# Patient Record
Sex: FEMALE | Race: WHITE | NOT HISPANIC OR LATINO | URBAN - METROPOLITAN AREA
[De-identification: names, ages, dates, MRNs, and addresses within clinical notes are randomized per-mention and may not be internally consistent; named-entity substitution may affect disease eponyms.]

---

## 2017-12-12 ENCOUNTER — OUTPATIENT (OUTPATIENT)
Dept: OUTPATIENT SERVICES | Facility: HOSPITAL | Age: 67
LOS: 1 days | Discharge: HOME | End: 2017-12-12

## 2017-12-12 DIAGNOSIS — M54.5 LOW BACK PAIN: ICD-10-CM

## 2018-06-11 ENCOUNTER — LABORATORY RESULT (OUTPATIENT)
Age: 68
End: 2018-06-11

## 2018-06-12 ENCOUNTER — LABORATORY RESULT (OUTPATIENT)
Age: 68
End: 2018-06-12

## 2018-06-12 ENCOUNTER — APPOINTMENT (OUTPATIENT)
Dept: HEMATOLOGY ONCOLOGY | Facility: CLINIC | Age: 68
End: 2018-06-12

## 2018-06-12 VITALS
WEIGHT: 116 LBS | TEMPERATURE: 98.3 F | DIASTOLIC BLOOD PRESSURE: 90 MMHG | BODY MASS INDEX: 26.1 KG/M2 | HEIGHT: 56 IN | HEART RATE: 89 BPM | RESPIRATION RATE: 16 BRPM | SYSTOLIC BLOOD PRESSURE: 172 MMHG

## 2018-06-12 DIAGNOSIS — Z86.79 PERSONAL HISTORY OF OTHER DISEASES OF THE CIRCULATORY SYSTEM: ICD-10-CM

## 2018-06-12 DIAGNOSIS — Z86.39 PERSONAL HISTORY OF OTHER ENDOCRINE, NUTRITIONAL AND METABOLIC DISEASE: ICD-10-CM

## 2018-06-12 DIAGNOSIS — Z78.9 OTHER SPECIFIED HEALTH STATUS: ICD-10-CM

## 2018-06-12 DIAGNOSIS — Z82.49 FAMILY HISTORY OF ISCHEMIC HEART DISEASE AND OTHER DISEASES OF THE CIRCULATORY SYSTEM: ICD-10-CM

## 2018-06-12 LAB
HCT VFR BLD CALC: 31.9 %
HGB BLD-MCNC: 9.3 G/DL
MCHC RBC-ENTMCNC: 18 PG
MCHC RBC-ENTMCNC: 29.2 G/DL
MCV RBC AUTO: 61.7 FL
PLATELET # BLD AUTO: 224 K/UL
PMV BLD: NORMAL
RBC # BLD: 5.17 M/UL
RBC # FLD: 21 %
RETICS # AUTO: 2.2 %
RETICS AGGREG/RBC NFR: 114.8 K/UL
WBC # FLD AUTO: 14.31 K/UL

## 2018-06-12 RX ORDER — ATORVASTATIN CALCIUM 80 MG/1
TABLET, FILM COATED ORAL
Refills: 0 | Status: ACTIVE | COMMUNITY

## 2018-06-13 LAB
ALBUMIN SERPL ELPH-MCNC: 3.8 G/DL
ALBUPE: 40.4 %
ALP BLD-CCNC: 90 U/L
ALPHA1UPE: 17.3 %
ALPHA2UPE: 16.5 %
ALT SERPL-CCNC: 17 U/L
ANION GAP SERPL CALC-SCNC: 17 MMOL/L
APPEARANCE: CLEAR
AST SERPL-CCNC: 16 U/L
BETAUPE: 12.4 %
BILIRUB SERPL-MCNC: 0.4 MG/DL
BILIRUBIN URINE: NEGATIVE
BLOOD URINE: NEGATIVE
BUN SERPL-MCNC: 30 MG/DL
CALCIUM SERPL-MCNC: 9.5 MG/DL
CHLORIDE SERPL-SCNC: 103 MMOL/L
CO2 SERPL-SCNC: 19 MMOL/L
COLOR: YELLOW
CREAT 24H UR-MCNC: NORMAL G/24 H
CREAT SERPL-MCNC: 0.9 MG/DL
CREATININE UR (MAYO): 71 MG/DL
GAMMAUPE: 13.4 %
GLUCOSE QUALITATIVE U: 500 MG/DL
GLUCOSE SERPL-MCNC: 230 MG/DL
IGA 24H UR QL IFE: NORMAL
IRON SATN MFR SERPL: 16 %
IRON SERPL-MCNC: 47 UG/DL
KAPPA LC 24H UR QL: NORMAL
KETONES URINE: NEGATIVE
LDH SERPL-CCNC: 393
LEUKOCYTE ESTERASE URINE: ABNORMAL
NITRITE URINE: NEGATIVE
PH URINE: 5.5
POTASSIUM SERPL-SCNC: 5.8 MMOL/L
PROT PATTERN 24H UR ELPH-IMP: NORMAL
PROT SERPL-MCNC: 5.9 G/DL
PROT UR-MCNC: 29 MG/DL
PROT UR-MCNC: 29 MG/DL
PROTEIN URINE: 30 MG/DL
SODIUM SERPL-SCNC: 139 MMOL/L
SPECIFIC GRAVITY URINE: 1.02
SPECIMEN VOL 24H UR: NORMAL ML
TIBC SERPL-MCNC: 294 UG/DL
UIBC SERPL-MCNC: 247 UG/DL
UROBILINOGEN URINE: 0.2 MG/DL (ref 0.2–?)

## 2018-06-14 LAB
ALBUMIN MFR SERPL ELPH: 58.6 %
ALBUMIN SERPL-MCNC: 3.5 G/DL
ALBUMIN/GLOB SERPL: 1.5 RATIO
ALPHA1 GLOB MFR SERPL ELPH: 5.1 %
ALPHA1 GLOB SERPL ELPH-MCNC: 0.3 G/DL
ALPHA2 GLOB MFR SERPL ELPH: 15.9 %
ALPHA2 GLOB SERPL ELPH-MCNC: 0.9 G/DL
B-GLOBULIN MFR SERPL ELPH: 12.2 %
B-GLOBULIN SERPL ELPH-MCNC: 0.7 G/DL
BACTERIA UR CULT: NORMAL
FERRITIN SERPL-MCNC: 61 NG/ML
FOLATE SERPL-MCNC: 6.1 NG/ML
GAMMA GLOB FLD ELPH-MCNC: 0.5 G/DL
GAMMA GLOB MFR SERPL ELPH: 8.2 %
INTERPRETATION SERPL IEP-IMP: NORMAL
PROT SERPL-MCNC: 5.9 G/DL
PROT SERPL-MCNC: 5.9 G/DL
VIT B12 SERPL-MCNC: 620 PG/ML

## 2018-06-18 ENCOUNTER — MOBILE ON CALL (OUTPATIENT)
Age: 68
End: 2018-06-18

## 2018-06-18 ENCOUNTER — APPOINTMENT (OUTPATIENT)
Dept: HEMATOLOGY ONCOLOGY | Facility: CLINIC | Age: 68
End: 2018-06-18

## 2018-06-19 LAB
ALBUMIN SERPL ELPH-MCNC: 3.6 G/DL
ALP BLD-CCNC: 94 U/L
ALT SERPL-CCNC: 15 U/L
ANION GAP SERPL CALC-SCNC: 17 MMOL/L
AST SERPL-CCNC: 15 U/L
BILIRUB SERPL-MCNC: 0.4 MG/DL
BUN SERPL-MCNC: 33 MG/DL
CALCIUM SERPL-MCNC: 8.5 MG/DL
CHLORIDE SERPL-SCNC: 100 MMOL/L
CO2 SERPL-SCNC: 19 MMOL/L
CREAT SERPL-MCNC: 0.9 MG/DL
GLUCOSE SERPL-MCNC: 301 MG/DL
POTASSIUM SERPL-SCNC: 5.1 MMOL/L
PROT SERPL-MCNC: 5.4 G/DL
SODIUM SERPL-SCNC: 136 MMOL/L

## 2018-07-02 ENCOUNTER — APPOINTMENT (OUTPATIENT)
Dept: INFUSION THERAPY | Facility: CLINIC | Age: 68
End: 2018-07-02

## 2018-07-02 RX ORDER — IRON SUCROSE 20 MG/ML
200 INJECTION, SOLUTION INTRAVENOUS ONCE
Qty: 0 | Refills: 0 | Status: COMPLETED | OUTPATIENT
Start: 2018-07-02 | End: 2018-07-02

## 2018-07-02 RX ADMIN — IRON SUCROSE 220 MILLIGRAM(S): 20 INJECTION, SOLUTION INTRAVENOUS at 15:21

## 2018-07-09 ENCOUNTER — APPOINTMENT (OUTPATIENT)
Dept: INFUSION THERAPY | Facility: CLINIC | Age: 68
End: 2018-07-09

## 2018-07-09 RX ORDER — IRON SUCROSE 20 MG/ML
200 INJECTION, SOLUTION INTRAVENOUS ONCE
Qty: 0 | Refills: 0 | Status: COMPLETED | OUTPATIENT
Start: 2018-07-09 | End: 2018-07-09

## 2018-07-09 RX ADMIN — IRON SUCROSE 220 MILLIGRAM(S): 20 INJECTION, SOLUTION INTRAVENOUS at 15:25

## 2018-07-10 ENCOUNTER — APPOINTMENT (OUTPATIENT)
Dept: HEMATOLOGY ONCOLOGY | Facility: CLINIC | Age: 68
End: 2018-07-10

## 2018-07-18 ENCOUNTER — LABORATORY RESULT (OUTPATIENT)
Age: 68
End: 2018-07-18

## 2018-07-18 ENCOUNTER — APPOINTMENT (OUTPATIENT)
Dept: HEMATOLOGY ONCOLOGY | Facility: CLINIC | Age: 68
End: 2018-07-18

## 2018-07-18 ENCOUNTER — OUTPATIENT (OUTPATIENT)
Dept: OUTPATIENT SERVICES | Facility: HOSPITAL | Age: 68
LOS: 1 days | Discharge: HOME | End: 2018-07-18

## 2018-07-18 VITALS
TEMPERATURE: 97.1 F | WEIGHT: 116 LBS | BODY MASS INDEX: 26.1 KG/M2 | HEIGHT: 56 IN | DIASTOLIC BLOOD PRESSURE: 73 MMHG | HEART RATE: 94 BPM | SYSTOLIC BLOOD PRESSURE: 135 MMHG | RESPIRATION RATE: 16 BRPM

## 2018-07-18 DIAGNOSIS — D64.9 ANEMIA, UNSPECIFIED: ICD-10-CM

## 2018-07-18 DIAGNOSIS — Z00.00 ENCOUNTER FOR GENERAL ADULT MEDICAL EXAMINATION W/OUT ABNORMAL FINDINGS: ICD-10-CM

## 2018-07-18 DIAGNOSIS — M54.5 LOW BACK PAIN: ICD-10-CM

## 2018-07-18 LAB
HCT VFR BLD CALC: 29.7 %
HGB BLD-MCNC: 9 G/DL
MCHC RBC-ENTMCNC: 19.1 PG
MCHC RBC-ENTMCNC: 30.3 G/DL
MCV RBC AUTO: 62.9 FL
PLATELET # BLD AUTO: 298 K/UL
PMV BLD: NORMAL
RBC # BLD: 4.72 M/UL
RBC # FLD: 20.8 %
WBC # FLD AUTO: 13.22 K/UL

## 2018-07-18 RX ORDER — CLOPIDOGREL BISULFATE 75 MG/1
75 TABLET, FILM COATED ORAL
Refills: 0 | Status: ACTIVE | COMMUNITY

## 2018-07-19 LAB
DIRECT COOMBS: NORMAL
FERRITIN SERPL-MCNC: 533 NG/ML
HAPTOGLOB SERPL-MCNC: 190 MG/DL

## 2018-07-22 LAB
HGB A MFR BLD: 95.8 %
HGB A2 MFR BLD: 4.2 %
HGB F MFR BLD: 0 %
HGB FRACT BLD-IMP: NORMAL

## 2018-07-31 ENCOUNTER — APPOINTMENT (OUTPATIENT)
Dept: HEMATOLOGY ONCOLOGY | Facility: CLINIC | Age: 68
End: 2018-07-31

## 2018-09-26 ENCOUNTER — INPATIENT (INPATIENT)
Facility: HOSPITAL | Age: 68
LOS: 9 days | Discharge: HOME HEALTH | End: 2018-10-06
Attending: INTERNAL MEDICINE | Admitting: INTERNAL MEDICINE
Payer: MEDICARE

## 2018-09-26 VITALS
HEART RATE: 80 BPM | DIASTOLIC BLOOD PRESSURE: 90 MMHG | SYSTOLIC BLOOD PRESSURE: 140 MMHG | RESPIRATION RATE: 16 BRPM | OXYGEN SATURATION: 98 % | TEMPERATURE: 98 F

## 2018-09-26 LAB
ALBUMIN SERPL ELPH-MCNC: 4 G/DL — SIGNIFICANT CHANGE UP (ref 3.5–5.2)
ALP SERPL-CCNC: 73 U/L — SIGNIFICANT CHANGE UP (ref 30–115)
ALT FLD-CCNC: 8 U/L — SIGNIFICANT CHANGE UP (ref 0–41)
ANION GAP SERPL CALC-SCNC: 17 MMOL/L — HIGH (ref 7–14)
ANISOCYTOSIS BLD QL: SIGNIFICANT CHANGE UP
AST SERPL-CCNC: 14 U/L — SIGNIFICANT CHANGE UP (ref 0–41)
BASOPHILS # BLD AUTO: 0 K/UL — SIGNIFICANT CHANGE UP (ref 0–0.2)
BASOPHILS NFR BLD AUTO: 0 % — SIGNIFICANT CHANGE UP (ref 0–1)
BILIRUB SERPL-MCNC: 0.3 MG/DL — SIGNIFICANT CHANGE UP (ref 0.2–1.2)
BUN SERPL-MCNC: 33 MG/DL — HIGH (ref 10–20)
CALCIUM SERPL-MCNC: 9 MG/DL — SIGNIFICANT CHANGE UP (ref 8.5–10.1)
CHLORIDE SERPL-SCNC: 101 MMOL/L — SIGNIFICANT CHANGE UP (ref 98–110)
CO2 SERPL-SCNC: 19 MMOL/L — SIGNIFICANT CHANGE UP (ref 17–32)
CREAT SERPL-MCNC: 1 MG/DL — SIGNIFICANT CHANGE UP (ref 0.7–1.5)
EOSINOPHIL # BLD AUTO: 0 K/UL — SIGNIFICANT CHANGE UP (ref 0–0.7)
EOSINOPHIL NFR BLD AUTO: 0 % — SIGNIFICANT CHANGE UP (ref 0–8)
GLUCOSE SERPL-MCNC: 112 MG/DL — HIGH (ref 70–99)
HCT VFR BLD CALC: 30 % — LOW (ref 37–47)
HGB BLD-MCNC: 9 G/DL — LOW (ref 12–16)
HYPOCHROMIA BLD QL: SLIGHT — SIGNIFICANT CHANGE UP
LACTATE SERPL-SCNC: 2.1 MMOL/L — SIGNIFICANT CHANGE UP (ref 0.5–2.2)
LIDOCAIN IGE QN: 156 U/L — HIGH (ref 7–60)
LYMPHOCYTES # BLD AUTO: 41 % — SIGNIFICANT CHANGE UP (ref 20.5–51.1)
LYMPHOCYTES # BLD AUTO: 5.03 K/UL — HIGH (ref 1.2–3.4)
MANUAL SMEAR VERIFICATION: SIGNIFICANT CHANGE UP
MCHC RBC-ENTMCNC: 18.7 PG — LOW (ref 27–31)
MCHC RBC-ENTMCNC: 30 G/DL — LOW (ref 32–37)
MCV RBC AUTO: 62.4 FL — LOW (ref 81–99)
MONOCYTES # BLD AUTO: 1.84 K/UL — HIGH (ref 0.1–0.6)
MONOCYTES NFR BLD AUTO: 15 % — HIGH (ref 1.7–9.3)
NEUTROPHILS # BLD AUTO: 5.4 K/UL — SIGNIFICANT CHANGE UP (ref 1.4–6.5)
NEUTROPHILS NFR BLD AUTO: 44 % — SIGNIFICANT CHANGE UP (ref 42.2–75.2)
NRBC # BLD: 0 /100 — SIGNIFICANT CHANGE UP (ref 0–0)
NRBC # BLD: SIGNIFICANT CHANGE UP /100 WBCS (ref 0–0)
OVALOCYTES BLD QL SMEAR: SLIGHT — SIGNIFICANT CHANGE UP
PLAT MORPH BLD: ABNORMAL
PLATELET # BLD AUTO: 57 K/UL — LOW (ref 130–400)
POIKILOCYTOSIS BLD QL AUTO: SLIGHT — SIGNIFICANT CHANGE UP
POTASSIUM SERPL-MCNC: 5.9 MMOL/L — HIGH (ref 3.5–5)
POTASSIUM SERPL-SCNC: 5.9 MMOL/L — HIGH (ref 3.5–5)
PROT SERPL-MCNC: 6 G/DL — SIGNIFICANT CHANGE UP (ref 6–8)
RBC # BLD: 4.81 M/UL — SIGNIFICANT CHANGE UP (ref 4.2–5.4)
RBC # FLD: 17.3 % — HIGH (ref 11.5–14.5)
RBC BLD AUTO: ABNORMAL
ROULEAUX BLD QL SMEAR: PRESENT — SIGNIFICANT CHANGE UP
SODIUM SERPL-SCNC: 137 MMOL/L — SIGNIFICANT CHANGE UP (ref 135–146)
WBC # BLD: 12.28 K/UL — HIGH (ref 4.8–10.8)
WBC # FLD AUTO: 12.28 K/UL — HIGH (ref 4.8–10.8)

## 2018-09-26 RX ORDER — SODIUM CHLORIDE 9 MG/ML
1000 INJECTION INTRAMUSCULAR; INTRAVENOUS; SUBCUTANEOUS ONCE
Qty: 0 | Refills: 0 | Status: COMPLETED | OUTPATIENT
Start: 2018-09-26 | End: 2018-09-26

## 2018-09-26 RX ADMIN — SODIUM CHLORIDE 1000 MILLILITER(S): 9 INJECTION INTRAMUSCULAR; INTRAVENOUS; SUBCUTANEOUS at 22:10

## 2018-09-26 RX ADMIN — SODIUM CHLORIDE 2000 MILLILITER(S): 9 INJECTION INTRAMUSCULAR; INTRAVENOUS; SUBCUTANEOUS at 21:40

## 2018-09-26 NOTE — ED PROVIDER NOTE - ATTENDING CONTRIBUTION TO CARE
I personally evaluated the patient. I reviewed the Resident’s or Physician Assistant’s note (as assigned above), and agree with the findings and plan except as documented in my note.  68 yr F with hx of HTN, DM, CAD s/p CABG by Dr. Patterson presents with complaints of worsening chronic back pain due to compression fractures, RUQ non radiating abd pain. Symptoms worsening for the past 3 days. No CP, SOB, f/c, nausea, vomiting, or urinary complaints. VS reviewed, pt well appearing, NAD. Head ncat, neck supple, no JVD, normal s1s2 without any murmurs, Lungs CTAB with normal work of breathing. abd +BS, s/nd/ + ttp of the RUQ w/o guarding or rebound, + midline thoracolumbar ttp that is not new,  extremities wnl, neurovascularly  intact with b/l + straight leg tests, neuro exam grossly normal. No acute skin rashes. Plan is labs, abd imaging, pain control and reassess.

## 2018-09-26 NOTE — ED PROVIDER NOTE - PHYSICAL EXAMINATION
VITAL SIGNS: I have reviewed the initial vital signs.   CONSTITUTIONAL: Awake, alert. Well-developed; well-nourished; in no distress. Non-toxic appearing.   SKIN: No rash, vesicles/lesion, abrasions or lacerations. No ecchymosis or signs of trauma.   HEAD: Normocephalic; atraumatic.   CARD: No chest wall deformity or tenderness. S1, S2 normal; no murmurs, gallops, or rubs. Regular rate and rhythm.  RESP: Good air movement. Lungs CTAB. No crackles, wheezes, rales or rhonchi.  ABD: Soft; non-distended; + diffuse tenderness. No rebound/guarding/rigidity. No CVA tenderness.   EXT: No bony deformity or tenderness. Normal ROM x 4 extremities.

## 2018-09-26 NOTE — ED PROVIDER NOTE - CARE PLAN
Principal Discharge DX:	Intractable pain  Secondary Diagnosis:	Elevated lipase  Secondary Diagnosis:	Compression fracture of vertebra, non-traumatic

## 2018-09-26 NOTE — ED PROVIDER NOTE - OBJECTIVE STATEMENT
Pt is a 67 y/o Female, PMHX of htn, Insulin dependant DM, CABG, back fxs, presents to ED w/ abdominal pain x 3 days. Pt reports it is diffuse, but worse in the RUQ and radiates towards the flank. Pt denies fever, chills, n/v/d, urinary symptoms, trauma.

## 2018-09-26 NOTE — ED PROVIDER NOTE - PROGRESS NOTE DETAILS
QUOC mccormack: Pt signed out to me. agree with above hx and pe. +RUQ TTP. no RLQ TTP. no rebound/guarding. neg reinoso's. +elevated lipase. CT without acute intraabdominal process, but 2 incidental findings: 2 new spinal compression fxs and ?L fem dvt. will admit for further workup and ?pancreatitis. inpt team to follow sono. discussed with MAR and with Dr. ahuja

## 2018-09-26 NOTE — ED PROVIDER NOTE - MEDICAL DECISION MAKING DETAILS
PT to be admitted for intractable back pain, Pt also with unresolved abd pain and has a questionable left femoral DVT that needs to be worked up.

## 2018-09-26 NOTE — ED PROVIDER NOTE - NS ED ROS FT
Except as documented in HPI, all other ROS negative.   GENERAL: Denies fever/chills, loss of appetite/weight or fatigue.  SKIN: Denies rashes, abrasions, lacerations, ecchymosis, erythema, or edema.  HEAD: Denies headache, dizziness or trauma.  CARDIAC: Denies chest pain, palpitations, or SOB.   RESPIRATORY: Denies SOB, cough, hemoptysis or wheezing.   GI: + abdominal pain.   : Denies hematuria, dysuria or frequency.   MSK: Denies myalgias, bony deformity or pain.   NEURO: Denies paresthesias, tingling, weakness.

## 2018-09-27 DIAGNOSIS — Z98.890 OTHER SPECIFIED POSTPROCEDURAL STATES: Chronic | ICD-10-CM

## 2018-09-27 DIAGNOSIS — I80.10 PHLEBITIS AND THROMBOPHLEBITIS OF UNSPECIFIED FEMORAL VEIN: ICD-10-CM

## 2018-09-27 DIAGNOSIS — R09.89 OTHER SPECIFIED SYMPTOMS AND SIGNS INVOLVING THE CIRCULATORY AND RESPIRATORY SYSTEMS: ICD-10-CM

## 2018-09-27 LAB
APPEARANCE UR: CLEAR — SIGNIFICANT CHANGE UP
APTT BLD: 24.2 SEC — LOW (ref 27–39.2)
BASOPHILS # BLD AUTO: 0.04 K/UL — SIGNIFICANT CHANGE UP (ref 0–0.2)
BASOPHILS NFR BLD AUTO: 0.3 % — SIGNIFICANT CHANGE UP (ref 0–1)
BILIRUB UR-MCNC: NEGATIVE — SIGNIFICANT CHANGE UP
COLOR SPEC: YELLOW — SIGNIFICANT CHANGE UP
DIFF PNL FLD: NEGATIVE — SIGNIFICANT CHANGE UP
EOSINOPHIL # BLD AUTO: 0.25 K/UL — SIGNIFICANT CHANGE UP (ref 0–0.7)
EOSINOPHIL NFR BLD AUTO: 1.6 % — SIGNIFICANT CHANGE UP (ref 0–8)
GLUCOSE BLDC GLUCOMTR-MCNC: 135 MG/DL — HIGH (ref 70–99)
GLUCOSE BLDC GLUCOMTR-MCNC: 281 MG/DL — HIGH (ref 70–99)
GLUCOSE BLDC GLUCOMTR-MCNC: 91 MG/DL — SIGNIFICANT CHANGE UP (ref 70–99)
GLUCOSE UR QL: NEGATIVE MG/DL — SIGNIFICANT CHANGE UP
HCT VFR BLD CALC: 33.2 % — LOW (ref 37–47)
HGB BLD-MCNC: 9.9 G/DL — LOW (ref 12–16)
IMM GRANULOCYTES NFR BLD AUTO: 2.2 % — HIGH (ref 0.1–0.3)
INR BLD: 0.98 RATIO — SIGNIFICANT CHANGE UP (ref 0.65–1.3)
KETONES UR-MCNC: NEGATIVE — SIGNIFICANT CHANGE UP
LEUKOCYTE ESTERASE UR-ACNC: NEGATIVE — SIGNIFICANT CHANGE UP
LYMPHOCYTES # BLD AUTO: 40.1 % — SIGNIFICANT CHANGE UP (ref 20.5–51.1)
LYMPHOCYTES # BLD AUTO: 6.14 K/UL — HIGH (ref 1.2–3.4)
MCHC RBC-ENTMCNC: 18.7 PG — LOW (ref 27–31)
MCHC RBC-ENTMCNC: 29.8 G/DL — LOW (ref 32–37)
MCV RBC AUTO: 62.8 FL — LOW (ref 81–99)
MONOCYTES # BLD AUTO: 1.79 K/UL — HIGH (ref 0.1–0.6)
MONOCYTES NFR BLD AUTO: 11.7 % — HIGH (ref 1.7–9.3)
NEUTROPHILS # BLD AUTO: 6.74 K/UL — HIGH (ref 1.4–6.5)
NEUTROPHILS NFR BLD AUTO: 44.1 % — SIGNIFICANT CHANGE UP (ref 42.2–75.2)
NITRITE UR-MCNC: NEGATIVE — SIGNIFICANT CHANGE UP
NRBC # BLD: 0 /100 WBCS — SIGNIFICANT CHANGE UP (ref 0–0)
PH UR: 6 — SIGNIFICANT CHANGE UP (ref 5–8)
PLATELET # BLD AUTO: 83 K/UL — LOW (ref 130–400)
PROT UR-MCNC: NEGATIVE MG/DL — SIGNIFICANT CHANGE UP
PROTHROM AB SERPL-ACNC: 10.6 SEC — SIGNIFICANT CHANGE UP (ref 9.95–12.87)
RBC # BLD: 5.29 M/UL — SIGNIFICANT CHANGE UP (ref 4.2–5.4)
RBC # FLD: 17.8 % — HIGH (ref 11.5–14.5)
SP GR SPEC: 1.01 — SIGNIFICANT CHANGE UP (ref 1.01–1.03)
UROBILINOGEN FLD QL: 0.2 MG/DL — SIGNIFICANT CHANGE UP (ref 0.2–0.2)
WBC # BLD: 15.3 K/UL — HIGH (ref 4.8–10.8)
WBC # FLD AUTO: 15.3 K/UL — HIGH (ref 4.8–10.8)

## 2018-09-27 PROCEDURE — 93970 EXTREMITY STUDY: CPT | Mod: 26

## 2018-09-27 RX ORDER — INSULIN LISPRO 100/ML
7 VIAL (ML) SUBCUTANEOUS
Qty: 0 | Refills: 0 | Status: DISCONTINUED | OUTPATIENT
Start: 2018-09-27 | End: 2018-10-04

## 2018-09-27 RX ORDER — PANTOPRAZOLE SODIUM 20 MG/1
40 TABLET, DELAYED RELEASE ORAL
Qty: 0 | Refills: 0 | Status: DISCONTINUED | OUTPATIENT
Start: 2018-09-27 | End: 2018-10-04

## 2018-09-27 RX ORDER — ISOSORBIDE MONONITRATE 60 MG/1
10 TABLET, EXTENDED RELEASE ORAL DAILY
Qty: 0 | Refills: 0 | Status: DISCONTINUED | OUTPATIENT
Start: 2018-09-27 | End: 2018-09-27

## 2018-09-27 RX ORDER — SODIUM CHLORIDE 9 MG/ML
1000 INJECTION, SOLUTION INTRAVENOUS
Qty: 0 | Refills: 0 | Status: DISCONTINUED | OUTPATIENT
Start: 2018-09-27 | End: 2018-10-04

## 2018-09-27 RX ORDER — DEXTROSE 50 % IN WATER 50 %
12.5 SYRINGE (ML) INTRAVENOUS ONCE
Qty: 0 | Refills: 0 | Status: DISCONTINUED | OUTPATIENT
Start: 2018-09-27 | End: 2018-10-04

## 2018-09-27 RX ORDER — INSULIN LISPRO 100/ML
VIAL (ML) SUBCUTANEOUS
Qty: 0 | Refills: 0 | Status: DISCONTINUED | OUTPATIENT
Start: 2018-09-27 | End: 2018-10-04

## 2018-09-27 RX ORDER — GABAPENTIN 400 MG/1
400 CAPSULE ORAL THREE TIMES A DAY
Qty: 0 | Refills: 0 | Status: DISCONTINUED | OUTPATIENT
Start: 2018-09-27 | End: 2018-10-04

## 2018-09-27 RX ORDER — TRAMADOL HYDROCHLORIDE 50 MG/1
50 TABLET ORAL ONCE
Qty: 0 | Refills: 0 | Status: DISCONTINUED | OUTPATIENT
Start: 2018-09-27 | End: 2018-09-27

## 2018-09-27 RX ORDER — MORPHINE SULFATE 50 MG/1
2 CAPSULE, EXTENDED RELEASE ORAL EVERY 4 HOURS
Qty: 0 | Refills: 0 | Status: DISCONTINUED | OUTPATIENT
Start: 2018-09-27 | End: 2018-09-30

## 2018-09-27 RX ORDER — FUROSEMIDE 40 MG
40 TABLET ORAL ONCE
Qty: 0 | Refills: 0 | Status: COMPLETED | OUTPATIENT
Start: 2018-09-27 | End: 2018-09-27

## 2018-09-27 RX ORDER — TRAMADOL HYDROCHLORIDE 50 MG/1
50 TABLET ORAL THREE TIMES A DAY
Qty: 0 | Refills: 0 | Status: DISCONTINUED | OUTPATIENT
Start: 2018-09-27 | End: 2018-10-04

## 2018-09-27 RX ORDER — LOSARTAN POTASSIUM 100 MG/1
50 TABLET, FILM COATED ORAL DAILY
Qty: 0 | Refills: 0 | Status: DISCONTINUED | OUTPATIENT
Start: 2018-09-27 | End: 2018-10-04

## 2018-09-27 RX ORDER — ACETAMINOPHEN 500 MG
650 TABLET ORAL EVERY 6 HOURS
Qty: 0 | Refills: 0 | Status: DISCONTINUED | OUTPATIENT
Start: 2018-09-27 | End: 2018-10-04

## 2018-09-27 RX ORDER — DEXTROSE 50 % IN WATER 50 %
25 SYRINGE (ML) INTRAVENOUS ONCE
Qty: 0 | Refills: 0 | Status: DISCONTINUED | OUTPATIENT
Start: 2018-09-27 | End: 2018-10-04

## 2018-09-27 RX ORDER — SODIUM CHLORIDE 9 MG/ML
500 INJECTION INTRAMUSCULAR; INTRAVENOUS; SUBCUTANEOUS ONCE
Qty: 0 | Refills: 0 | Status: COMPLETED | OUTPATIENT
Start: 2018-09-27 | End: 2018-09-27

## 2018-09-27 RX ORDER — ATORVASTATIN CALCIUM 80 MG/1
40 TABLET, FILM COATED ORAL AT BEDTIME
Qty: 0 | Refills: 0 | Status: DISCONTINUED | OUTPATIENT
Start: 2018-09-27 | End: 2018-10-04

## 2018-09-27 RX ORDER — MORPHINE SULFATE 50 MG/1
4 CAPSULE, EXTENDED RELEASE ORAL ONCE
Qty: 0 | Refills: 0 | Status: DISCONTINUED | OUTPATIENT
Start: 2018-09-27 | End: 2018-09-27

## 2018-09-27 RX ORDER — GLUCAGON INJECTION, SOLUTION 0.5 MG/.1ML
1 INJECTION, SOLUTION SUBCUTANEOUS ONCE
Qty: 0 | Refills: 0 | Status: DISCONTINUED | OUTPATIENT
Start: 2018-09-27 | End: 2018-10-04

## 2018-09-27 RX ORDER — CLOPIDOGREL BISULFATE 75 MG/1
75 TABLET, FILM COATED ORAL DAILY
Qty: 0 | Refills: 0 | Status: DISCONTINUED | OUTPATIENT
Start: 2018-09-27 | End: 2018-10-01

## 2018-09-27 RX ORDER — FUROSEMIDE 40 MG
40 TABLET ORAL DAILY
Qty: 0 | Refills: 0 | Status: DISCONTINUED | OUTPATIENT
Start: 2018-09-28 | End: 2018-10-04

## 2018-09-27 RX ORDER — ENOXAPARIN SODIUM 100 MG/ML
50 INJECTION SUBCUTANEOUS EVERY 12 HOURS
Qty: 0 | Refills: 0 | Status: DISCONTINUED | OUTPATIENT
Start: 2018-09-27 | End: 2018-10-04

## 2018-09-27 RX ORDER — DEXTROSE 50 % IN WATER 50 %
15 SYRINGE (ML) INTRAVENOUS ONCE
Qty: 0 | Refills: 0 | Status: DISCONTINUED | OUTPATIENT
Start: 2018-09-27 | End: 2018-10-04

## 2018-09-27 RX ORDER — INSULIN GLARGINE 100 [IU]/ML
25 INJECTION, SOLUTION SUBCUTANEOUS
Qty: 0 | Refills: 0 | Status: DISCONTINUED | OUTPATIENT
Start: 2018-09-27 | End: 2018-10-04

## 2018-09-27 RX ADMIN — ENOXAPARIN SODIUM 50 MILLIGRAM(S): 100 INJECTION SUBCUTANEOUS at 13:12

## 2018-09-27 RX ADMIN — Medication 7 UNIT(S): at 18:02

## 2018-09-27 RX ADMIN — MORPHINE SULFATE 2 MILLIGRAM(S): 50 CAPSULE, EXTENDED RELEASE ORAL at 11:26

## 2018-09-27 RX ADMIN — PANTOPRAZOLE SODIUM 40 MILLIGRAM(S): 20 TABLET, DELAYED RELEASE ORAL at 13:13

## 2018-09-27 RX ADMIN — ATORVASTATIN CALCIUM 40 MILLIGRAM(S): 80 TABLET, FILM COATED ORAL at 22:58

## 2018-09-27 RX ADMIN — Medication 3: at 18:02

## 2018-09-27 RX ADMIN — SODIUM CHLORIDE 500 MILLILITER(S): 9 INJECTION INTRAMUSCULAR; INTRAVENOUS; SUBCUTANEOUS at 04:11

## 2018-09-27 RX ADMIN — TRAMADOL HYDROCHLORIDE 50 MILLIGRAM(S): 50 TABLET ORAL at 22:58

## 2018-09-27 RX ADMIN — TRAMADOL HYDROCHLORIDE 50 MILLIGRAM(S): 50 TABLET ORAL at 13:13

## 2018-09-27 RX ADMIN — LOSARTAN POTASSIUM 50 MILLIGRAM(S): 100 TABLET, FILM COATED ORAL at 13:13

## 2018-09-27 RX ADMIN — GABAPENTIN 400 MILLIGRAM(S): 400 CAPSULE ORAL at 22:58

## 2018-09-27 RX ADMIN — INSULIN GLARGINE 25 UNIT(S): 100 INJECTION, SOLUTION SUBCUTANEOUS at 22:58

## 2018-09-27 RX ADMIN — MORPHINE SULFATE 4 MILLIGRAM(S): 50 CAPSULE, EXTENDED RELEASE ORAL at 03:40

## 2018-09-27 RX ADMIN — MORPHINE SULFATE 4 MILLIGRAM(S): 50 CAPSULE, EXTENDED RELEASE ORAL at 03:10

## 2018-09-27 RX ADMIN — Medication 5 MILLIGRAM(S): at 13:13

## 2018-09-27 RX ADMIN — Medication 5 MILLIGRAM(S): at 22:58

## 2018-09-27 RX ADMIN — GABAPENTIN 400 MILLIGRAM(S): 400 CAPSULE ORAL at 13:12

## 2018-09-27 RX ADMIN — CLOPIDOGREL BISULFATE 75 MILLIGRAM(S): 75 TABLET, FILM COATED ORAL at 13:12

## 2018-09-27 RX ADMIN — Medication 40 MILLIGRAM(S): at 13:12

## 2018-09-27 RX ADMIN — Medication 5 MILLIGRAM(S): at 10:59

## 2018-09-27 NOTE — H&P ADULT - FAMILY HISTORY
Father  Still living? No  Family history of coronary artery disease, Age at diagnosis: Age Unknown     Sibling  Still living? Yes, Estimated age: Age Unknown  Family history of thalassemia, Age at diagnosis: Age Unknown

## 2018-09-27 NOTE — H&P ADULT - NSHPLABSRESULTS_GEN_ALL_CORE
9.0    12.28 )-----------( 57       ( 26 Sep 2018 20:37 )             30.0     137  |  101  |  33<H>  ----------------------------<  112<H>  5.9<H>   |  19  |  1.0    Ca    9.0      26 Sep 2018 20:37    TPro  6.0  /  Alb  4.0  /  TBili  0.3  /  DBili  x   /  AST  14  /  ALT  8   /  AlkPhos  73          Urinalysis Basic - ( 27 Sep 2018 00:30 )    Color: Yellow / Appearance: Clear / S.015 / pH: x  Gluc: x / Ketone: Negative  / Bili: Negative / Urobili: 0.2 mg/dL   Blood: x / Protein: Negative mg/dL / Nitrite: Negative   Leuk Esterase: Negative / RBC: x / WBC x   Sq Epi: x / Non Sq Epi: x / Bacteria: x    Lactate Trend   @ 20:37 Lactate:2.1     < from: CT Abdomen and Pelvis w/ IV Cont (18 @ 00:32) >    IMPRESSION:     Hypodensity within the left femoral vein suspicious for DVT. Correlation   with doppler ultrasound recommended.    New T11 and T12 moderate compression deformities. Unchanged moderate   compression deformities of L3, L4 post kyphoplasty of L4.    No evidence of acute intra-abdominal pathology.    < end of copied text >    < from: US Abdomen Limited (18 @ 04:00) >    IMPRESSION:    No acute gallbladder findings.   Partially visualized duodenum with wall thickening. Correlate with   clinical findings.     < end of copied text >    < from: VA Duplex Lower Ext Vein Scan, Bilat (18 @ 09:09) >    Impression:    No evidence of deep venous thrombosis or superficial thrombophlebitis in   the right lower extremity    Acute left common femoral and femoral vein DVT.    < end of copied text >

## 2018-09-27 NOTE — H&P ADULT - PMH
Anemia    CAD (coronary artery disease)    Diabetes    Dyslipidemia    HTN (hypertension)    Osteoporosis    Rheumatoid arthritis

## 2018-09-27 NOTE — H&P ADULT - HISTORY OF PRESENT ILLNESS
69 yo female patient with PMHx HTN, DM II, CAD/CABG x2 (last in 2013), DL, RA on chronic prednisone 15mg daily for 30 years complicated by osteoporosis and vertebral fractures s/p kyphoplasty 3 months ago, anemia, presenting because of worsening mid back pain and abdominal pain over the last 4 days.  Patient reports mid back severe pain, started spontaneously 4 days ago, no back trauma or injury. Pain is sharp, poorly relieved by tylenol. Unable to lay flat, she says she's been sleeping sitting on a sofa. Later on, she started experiencing abdominal pain, colicky, mainly at the RUQ, moderate in intensity, non radiating, not related to food intake, no nausea or vomiting or diarrhea associated, no fever or chills. No other complaints.  Note that patient has had decreased physical activity it's been around 9 months since she had the vertebral fractures. She spends most of her time in the sofa.

## 2018-09-27 NOTE — H&P ADULT - ASSESSMENT
67 yo female patient with PMHx HTN, DM II, CAD/CABG x2 (last in 2013), DL, RA on chronic prednisone 15mg daily for 30 years complicated by osteoporosis and vertebral fractures s/p kyphoplasty 3 months ago, anemia, presenting because of worsening mid back pain and abdominal pain over the last 4 days.    #) Back pain -> Compression fracture T11 and T12  No neurological deficit  Neurosurgery evaluation  Analgesia as needed    #) Abdominal pain (RUQ) - Non-specific  Negative RUQ sono, negative CT abdomen  Lipase elvated x2 upper normal - pancreatitis is unlikely  The pain starts in the right flank and covers the whole RUQ - Could be beginning of shingles? no skin manifestations for now V/S radiating pain from T10-T11 fractures (this is the dermatome of T10)  Continue with analgesics, gabapentin, follow up neurosurgery  Daily skin examination at the site looking for zoster    #) Anemia (hg at baseline, chronically at 9)  Microcytic (MCV 62), with elevated RDW, low normal RBC counts  Looks multifactorial:   - Baseline Beta-thalassemia minor (according to hemoglobin electrophoresis done as outpatient)   - DILLON secondary to chronic hemorrhoidal bleeding (patient used to see blood in stools, currently resolved after her hemorrhoidectomy)   - Anemia secondary to chronic inflammatory disease (RA)   - R/O other hematological disease (since patient has chronically elevated WBC in the 12 to 84033 range (known by Dr. Maza) considering Bone marrow biopsy)  Trend Hg, transfuse if less than 7  Consider Hematology consult    #) Thrombocytopenia (Plts 56)  Were >200 two months ago  Sent a stat repeat CBC r/o lab error, r/o plts clumping  Otherwise, can be secondary to hematologic disease (MDS, leukemia, etc...)  Sent PT PTT stat R/O DIC (can be chronic DIC)  HIT is unlikely, patient denied recent hospitalization and/or heparin use    #) Acute DVT + Exertional shortness of breath  Likely provoked DVT secondary to decreased ambulation for the last 9 months because of the back fractures  Have to r/o PE too (chronic VTE) since patient has exertional SOB - will check VQ scan  Needs full anticoagulation, can give lovenox 1mg/kg (will start after getting the repeat CBC and PT PTT results)  If low platelets or coagulopathy, will have to consult vascular for IVC filter  Will check 2d echo (to check SPAP, right heart strain...)    #) Lower extremities edema  Pending 2d echo r/o CHF  can also be secondary to DVT (but edema is bilateral, not only on the side of the DVT)    #) RA  On chronic prednisone 15mg daily for 30 years  Patient is experiencing side effects from prednisone (Osteoporosis, vertebral fractures, hyperglycemia/DM?)  Would consider treatment for RA with DMARD, and tapering down prednisone  Consider Rheumatology consult vs outpatient consult    #) Miscellaneous  DVT and GI ppx  DASH diet, carb consistent  Out of bed to chair  Full code  Dispo home

## 2018-09-27 NOTE — H&P ADULT - NSHPPHYSICALEXAM_GEN_ALL_CORE
PHYSICAL EXAM:    T(F): 96.9, Max: 97.6 (09-26-18 @ 19:32)  HR: 74  BP: 151/78  RR: 18  SpO2: 96%    GENERAL: well-developed, looks uncomfortable  HEAD:  Atraumatic, Normocephalic  EYES: EOMI, PERRLA, conjunctiva and sclera clear  NECK: Supple, No JVD  CHEST/LUNG: Clear to auscultation bilaterally; No wheeze  HEART: Regular rate and rhythm; systolic murmur 2/6 left parasternal  ABDOMEN: Soft, Nondistended; Bowel sounds present; RUQ tenderness, reinoso negative  EXTREMITIES:  2+ Peripheral Pulses, No clubbing or cyanosis, +1 pitting edema  BACK: tenderness to palpation over the mid back  PSYCH: AAOx3  NEUROLOGY: non-focal  SKIN: Bilateral upper extremities spontaneous ecchymoses

## 2018-09-27 NOTE — CONSULT NOTE ADULT - SUBJECTIVE AND OBJECTIVE BOX
HISTORY OF PRESENT ILLNESS:   · HPI Objective Statement: Pt is a 67 y/o Female, PMHX of htn, Insulin dependant DM, CABG, back fxs, presents to ED w/ back pain and abdominal pain x 3 days. Pt reports it is diffuse, but worse in the RUQ and radiates towards the flank. Pt denies fever, chills, n/v/d, urinary symptoms, trauma.	      PAST MEDICAL & SURGICAL HISTORY:  Anemia  Osteoporosis  Rheumatoid arthritis  Dyslipidemia  HTN (hypertension)  CAD (coronary artery disease)  Diabetes  History of hemorrhoidectomy    FAMILY HISTORY:  Family history of thalassemia (Sibling)  Family history of coronary artery disease (Father)      SOCIAL HISTORY:  Tobacco Use:  EtOH use:   Substance:    Allergies    penicillin (Anaphylaxis; Angioedema)    Intolerances    MEDICATIONS:  Antibiotics:    Neuro:  acetaminophen   Tablet .. 650 milliGRAM(s) Oral every 6 hours PRN  gabapentin 400 milliGRAM(s) Oral three times a day  morphine  - Injectable 2 milliGRAM(s) IV Push every 4 hours PRN  traMADol 50 milliGRAM(s) Oral three times a day PRN    Anticoagulation:  clopidogrel Tablet 75 milliGRAM(s) Oral daily  enoxaparin Injectable 50 milliGRAM(s) SubCutaneous every 12 hours    OTHER:  atorvastatin 40 milliGRAM(s) Oral at bedtime  dextrose 40% Gel 15 Gram(s) Oral once PRN  dextrose 50% Injectable 12.5 Gram(s) IV Push once  dextrose 50% Injectable 25 Gram(s) IV Push once  dextrose 50% Injectable 25 Gram(s) IV Push once  glucagon  Injectable 1 milliGRAM(s) IntraMuscular once PRN  insulin glargine Injectable (LANTUS) 25 Unit(s) SubCutaneous two times a day  insulin lispro (HumaLOG) corrective regimen sliding scale   SubCutaneous three times a day before meals  insulin lispro Injectable (HumaLOG) 7 Unit(s) SubCutaneous three times a day before meals  losartan 50 milliGRAM(s) Oral daily  pantoprazole    Tablet 40 milliGRAM(s) Oral before breakfast  predniSONE   Tablet 5 milliGRAM(s) Oral three times a day    IVF:  dextrose 5%. 1000 milliLiter(s) IV Continuous <Continuous>      Vital Signs Last 24 Hrs  T(C): 35.8 (27 Sep 2018 16:19), Max: 36.7 (27 Sep 2018 08:46)  T(F): 96.4 (27 Sep 2018 16:19), Max: 98 (27 Sep 2018 08:46)  HR: 74 (27 Sep 2018 16:19) (72 - 80)  BP: 148/73 (27 Sep 2018 16:19) (138/75 - 151/78)  BP(mean): --  RR: 20 (27 Sep 2018 16:19) (16 - 20)  SpO2: 95% (27 Sep 2018 16:19) (95% - 98%)    PHYSICAL EXAM:  A&Ox3 NAD speech clear  PEERLA EOMI  TLS (+) Tender to palpation Lower thoracic upper lumbar  motor PATTERSON equally  Sensory intact to L/T  symetrical DTRs    LABS:                        9.9    15.30 )-----------( 83       ( 27 Sep 2018 09:57 )             33.2     -    137  |  101  |  33<H>  ----------------------------<  112<H>  5.9<H>   |  19  |  1.0    Ca    9.0      26 Sep 2018 20:37    TPro  6.0  /  Alb  4.0  /  TBili  0.3  /  DBili  x   /  AST  14  /  ALT  8   /  AlkPhos  73  26    PT/INR - ( 27 Sep 2018 09:57 )   PT: 10.60 sec;   INR: 0.98 ratio         PTT - ( 27 Sep 2018 09:57 )  PTT:24.2 sec  Urinalysis Basic - ( 27 Sep 2018 00:30 )    Color: Yellow / Appearance: Clear / S.015 / pH: x  Gluc: x / Ketone: Negative  / Bili: Negative / Urobili: 0.2 mg/dL   Blood: x / Protein: Negative mg/dL / Nitrite: Negative   Leuk Esterase: Negative / RBC: x / WBC x   Sq Epi: x / Non Sq Epi: x / Bacteria: x      CULTURES:      RADIOLOGY & ADDITIONAL STUDIES:< from: CT Abdomen and Pelvis w/ IV Cont (18 @ 00:32) >  New T11 and T12 moderate compression deformities. Unchanged moderate   compression deformities of L3, L4 post kyphoplasty of L4.      < end of copied text >      Assessment: 66 yo female presents w/ 3 day h/o severe back pain radiating to abdomen (+) new compression deformity T11 T12      Plan:Stable  Pain control  MRI Thoracic Lumbar spine  Serial neuro checks  Will Follow  D/W attending

## 2018-09-27 NOTE — H&P ADULT - ATTENDING COMMENTS
Patient seen and examined independently. Agree with resident note with exceptions.    #New compression fracture T11 and 12  - CT Abdomen and Pelvis w/ IV Cont (09.27.18 @ 00:32) New T11 and T12 moderate compression deformities. Unchanged moderate compression deformities of L3, L4 post kyphoplasty of L4.  - Neurosurgery eval  - pain meds  - obtain  MRI Spine record's  from DR dubois.    #Acute DVT left femoral  - C/w lovenox  - F/u VQ scan, echo    #Abd pain - probably referred pain  - c/w pain meds  - colace, senna for constipation    #RA  - c/w prednisone  - out pt F/u with rhematoligy    #Microcytic anemia  - H/o hemorrhoids S/p hemorrhoidectomy  - Iron profile, ferritin  - Stool occult blood.  - hematology eval, pt withh leucocytosis, Anemia, thrombocytopenia-pt of Dr Maza - she was considering BM biopsy

## 2018-09-27 NOTE — H&P ADULT - NSHPSOCIALHISTORY_GEN_ALL_CORE
Social History:    Lives with: (  ) alone  ( X ) children   (  ) spouse   (  ) parents  (  ) other    Substance Use (street drugs): ( X ) never used  (  ) other:  Tobacco Usage:  ( X ) never smoked   (   ) former smoker   (   ) current smoker  (     ) pack year  (        ) last cigarette date  Alcohol Usage:  Negative

## 2018-09-27 NOTE — H&P ADULT - NSHPREVIEWOFSYSTEMS_GEN_ALL_CORE
Negative for headaches, vertigo, cough, chest pain, palpitations, urinary symptoms, diarrhea, no hematochezia  Positive for dyspnea on exertion, abdominal pain, back pain, lower extremities swelling

## 2018-09-28 LAB
ANION GAP SERPL CALC-SCNC: 17 MMOL/L — HIGH (ref 7–14)
BASOPHILS # BLD AUTO: 0.03 K/UL — SIGNIFICANT CHANGE UP (ref 0–0.2)
BASOPHILS NFR BLD AUTO: 0.3 % — SIGNIFICANT CHANGE UP (ref 0–1)
BUN SERPL-MCNC: 27 MG/DL — HIGH (ref 10–20)
CALCIUM SERPL-MCNC: 9 MG/DL — SIGNIFICANT CHANGE UP (ref 8.5–10.1)
CHLORIDE SERPL-SCNC: 101 MMOL/L — SIGNIFICANT CHANGE UP (ref 98–110)
CK MB CFR SERPL CALC: 3.2 NG/ML — SIGNIFICANT CHANGE UP (ref 0.6–6.3)
CK SERPL-CCNC: 34 U/L — SIGNIFICANT CHANGE UP (ref 0–225)
CK SERPL-CCNC: 36 U/L — SIGNIFICANT CHANGE UP (ref 0–225)
CO2 SERPL-SCNC: 22 MMOL/L — SIGNIFICANT CHANGE UP (ref 17–32)
CREAT SERPL-MCNC: 1.1 MG/DL — SIGNIFICANT CHANGE UP (ref 0.7–1.5)
CULTURE RESULTS: SIGNIFICANT CHANGE UP
EOSINOPHIL # BLD AUTO: 0.08 K/UL — SIGNIFICANT CHANGE UP (ref 0–0.7)
EOSINOPHIL NFR BLD AUTO: 0.7 % — SIGNIFICANT CHANGE UP (ref 0–8)
ERYTHROCYTE [SEDIMENTATION RATE] IN BLOOD: 11 MM/HR — SIGNIFICANT CHANGE UP (ref 0–20)
ESTIMATED AVERAGE GLUCOSE: 192 MG/DL — HIGH (ref 68–114)
GLUCOSE BLDC GLUCOMTR-MCNC: 178 MG/DL — HIGH (ref 70–99)
GLUCOSE BLDC GLUCOMTR-MCNC: 251 MG/DL — HIGH (ref 70–99)
GLUCOSE BLDC GLUCOMTR-MCNC: 262 MG/DL — HIGH (ref 70–99)
GLUCOSE BLDC GLUCOMTR-MCNC: 285 MG/DL — HIGH (ref 70–99)
GLUCOSE SERPL-MCNC: 126 MG/DL — HIGH (ref 70–99)
HBA1C BLD-MCNC: 8.3 % — HIGH (ref 4–5.6)
HCT VFR BLD CALC: 33.5 % — LOW (ref 37–47)
HGB BLD-MCNC: 9.9 G/DL — LOW (ref 12–16)
IMM GRANULOCYTES NFR BLD AUTO: 1.9 % — HIGH (ref 0.1–0.3)
LYMPHOCYTES # BLD AUTO: 37.7 % — SIGNIFICANT CHANGE UP (ref 20.5–51.1)
LYMPHOCYTES # BLD AUTO: 4.39 K/UL — HIGH (ref 1.2–3.4)
MAGNESIUM SERPL-MCNC: 2 MG/DL — SIGNIFICANT CHANGE UP (ref 1.8–2.4)
MCHC RBC-ENTMCNC: 18.4 PG — LOW (ref 27–31)
MCHC RBC-ENTMCNC: 29.6 G/DL — LOW (ref 32–37)
MCV RBC AUTO: 62.2 FL — LOW (ref 81–99)
MONOCYTES # BLD AUTO: 1.02 K/UL — HIGH (ref 0.1–0.6)
MONOCYTES NFR BLD AUTO: 8.7 % — SIGNIFICANT CHANGE UP (ref 1.7–9.3)
NEUTROPHILS # BLD AUTO: 5.92 K/UL — SIGNIFICANT CHANGE UP (ref 1.4–6.5)
NEUTROPHILS NFR BLD AUTO: 50.7 % — SIGNIFICANT CHANGE UP (ref 42.2–75.2)
NRBC # BLD: 0 /100 WBCS — SIGNIFICANT CHANGE UP (ref 0–0)
PLATELET # BLD AUTO: 72 K/UL — LOW (ref 130–400)
POTASSIUM SERPL-MCNC: 4.3 MMOL/L — SIGNIFICANT CHANGE UP (ref 3.5–5)
POTASSIUM SERPL-SCNC: 4.3 MMOL/L — SIGNIFICANT CHANGE UP (ref 3.5–5)
RBC # BLD: 5.39 M/UL — SIGNIFICANT CHANGE UP (ref 4.2–5.4)
RBC # FLD: 18.2 % — HIGH (ref 11.5–14.5)
SODIUM SERPL-SCNC: 140 MMOL/L — SIGNIFICANT CHANGE UP (ref 135–146)
SPECIMEN SOURCE: SIGNIFICANT CHANGE UP
TROPONIN T SERPL-MCNC: 0.05 NG/ML — CRITICAL HIGH
TROPONIN T SERPL-MCNC: 0.07 NG/ML — CRITICAL HIGH
WBC # BLD: 11.66 K/UL — HIGH (ref 4.8–10.8)
WBC # FLD AUTO: 11.66 K/UL — HIGH (ref 4.8–10.8)

## 2018-09-28 PROCEDURE — 99223 1ST HOSP IP/OBS HIGH 75: CPT

## 2018-09-28 RX ORDER — HYDROMORPHONE HYDROCHLORIDE 2 MG/ML
1 INJECTION INTRAMUSCULAR; INTRAVENOUS; SUBCUTANEOUS ONCE
Qty: 0 | Refills: 0 | Status: DISCONTINUED | OUTPATIENT
Start: 2018-09-28 | End: 2018-10-04

## 2018-09-28 RX ORDER — SIMETHICONE 80 MG/1
80 TABLET, CHEWABLE ORAL
Qty: 0 | Refills: 0 | Status: DISCONTINUED | OUTPATIENT
Start: 2018-09-28 | End: 2018-10-04

## 2018-09-28 RX ADMIN — Medication 1: at 09:49

## 2018-09-28 RX ADMIN — ENOXAPARIN SODIUM 50 MILLIGRAM(S): 100 INJECTION SUBCUTANEOUS at 09:47

## 2018-09-28 RX ADMIN — ATORVASTATIN CALCIUM 40 MILLIGRAM(S): 80 TABLET, FILM COATED ORAL at 21:45

## 2018-09-28 RX ADMIN — Medication 7 UNIT(S): at 13:12

## 2018-09-28 RX ADMIN — Medication 7 UNIT(S): at 09:50

## 2018-09-28 RX ADMIN — PANTOPRAZOLE SODIUM 40 MILLIGRAM(S): 20 TABLET, DELAYED RELEASE ORAL at 09:48

## 2018-09-28 RX ADMIN — Medication 5 MILLIGRAM(S): at 09:48

## 2018-09-28 RX ADMIN — LOSARTAN POTASSIUM 50 MILLIGRAM(S): 100 TABLET, FILM COATED ORAL at 09:48

## 2018-09-28 RX ADMIN — Medication 3: at 13:12

## 2018-09-28 RX ADMIN — INSULIN GLARGINE 25 UNIT(S): 100 INJECTION, SOLUTION SUBCUTANEOUS at 09:48

## 2018-09-28 RX ADMIN — GABAPENTIN 400 MILLIGRAM(S): 400 CAPSULE ORAL at 13:39

## 2018-09-28 RX ADMIN — CLOPIDOGREL BISULFATE 75 MILLIGRAM(S): 75 TABLET, FILM COATED ORAL at 12:01

## 2018-09-28 RX ADMIN — Medication 40 MILLIGRAM(S): at 09:47

## 2018-09-28 RX ADMIN — INSULIN GLARGINE 25 UNIT(S): 100 INJECTION, SOLUTION SUBCUTANEOUS at 22:20

## 2018-09-28 RX ADMIN — Medication 650 MILLIGRAM(S): at 06:53

## 2018-09-28 RX ADMIN — GABAPENTIN 400 MILLIGRAM(S): 400 CAPSULE ORAL at 21:45

## 2018-09-28 RX ADMIN — ENOXAPARIN SODIUM 50 MILLIGRAM(S): 100 INJECTION SUBCUTANEOUS at 18:47

## 2018-09-28 RX ADMIN — MORPHINE SULFATE 2 MILLIGRAM(S): 50 CAPSULE, EXTENDED RELEASE ORAL at 18:44

## 2018-09-28 RX ADMIN — Medication 3: at 18:52

## 2018-09-28 RX ADMIN — GABAPENTIN 400 MILLIGRAM(S): 400 CAPSULE ORAL at 09:48

## 2018-09-28 RX ADMIN — TRAMADOL HYDROCHLORIDE 50 MILLIGRAM(S): 50 TABLET ORAL at 02:31

## 2018-09-28 RX ADMIN — Medication 5 MILLIGRAM(S): at 13:38

## 2018-09-28 RX ADMIN — Medication 5 MILLIGRAM(S): at 21:45

## 2018-09-28 RX ADMIN — Medication 7 UNIT(S): at 18:52

## 2018-09-28 NOTE — PROGRESS NOTE ADULT - SUBJECTIVE AND OBJECTIVE BOX
67 yo female patient with PMHx HTN, DM II, CAD/CABG x2 (last in 2013), DL, RA on chronic prednisone 15mg daily for 30 years complicated by osteoporosis and vertebral fractures s/p kyphoplasty 3 months ago, anemia, presenting because of worsening mid back pain and abdominal pain over the last 4 days.  Patient reports mid back severe pain, started spontaneously 4 days ago, no back trauma or injury. Pain is sharp, poorly relieved by tylenol. Unable to lay flat, she says she's been sleeping sitting on a sofa. Later on, she started experiencing abdominal pain, colicky, mainly at the RUQ, moderate in intensity, non radiating, not related to food intake, no nausea or vomiting or diarrhea associated, no fever or chills. No other complaints.  Note that patient has had decreased physical activity it's been around 9 months since she had the vertebral fractures. She spends most of her time in the sofa. (27 Sep 2018 09:59)    pt was seen by neurosurgery and they recommended MRI of head plus C-T-L-S spine    Vital Signs Last 24 Hrs  T(C): 36.1 (28 Sep 2018 07:34), Max: 36.7 (28 Sep 2018 00:01)  T(F): 97 (28 Sep 2018 07:34), Max: 98 (28 Sep 2018 00:01)  HR: 72 (28 Sep 2018 07:34) (72 - 77)  BP: 159/85 (28 Sep 2018 07:34) (133/70 - 159/85)  BP(mean): --  RR: 20 (28 Sep 2018 07:34) (18 - 20)  SpO2: 97% (28 Sep 2018 07:34) (67% - 97%)    Physical exam:   constitutional NAD, AAOX3, Respiratory  lungs CTA, CVS heart RRR, GI: abdomen Soft NT, ND, BS+, skin: intact  neuro exam non focal.                           9.9    11.66 )-----------( 72       ( 28 Sep 2018 07:32 )             33.5     09-28    140  |  101  |  27<H>  ----------------------------<  126<H>  4.3   |  22  |  1.1    Ca    9.0      28 Sep 2018 07:32  Mg     2.0     09-28    TPro  6.0  /  Alb  4.0  /  TBili  0.3  /  DBili  x   /  AST  14  /  ALT  8   /  AlkPhos  73  09-26    a/p     new compression deformity T11 T12, severe osteoporosis, cont meds, MRI as per neurosurgery rec    pain meds    PAST MEDICAL & SURGICAL HISTORY: cont meds  Anemia  Osteoporosis  Rheumatoid arthritis  Dyslipidemia  HTN (hypertension)  CAD (coronary artery disease)  Diabetes, monitor fs, insulin protocol  History of hemorrhoidectomy    dw neurosurgery

## 2018-09-28 NOTE — PROGRESS NOTE ADULT - ASSESSMENT
Patient seen and examined independently. Agree with resident note with exceptions.    #New compression fracture T11 and 12  - CT Abdomen and Pelvis w/ IV Cont (09.27.18 @ 00:32) New T11 and T12 moderate compression deformities. Unchanged moderate compression deformities of L3, L4 post kyphoplasty of L4.  - Neurosurgery eval: get MR brain, spine (TLS) under anesthesia (discussed w/ anesthesia, will be done sometime next week)  - pain control for back pain      #Acute DVT left femoral  - C/w lovenox  - Patient could net get VQ scan due to pain while lying back , not tachycardic, O2 >95 on RA, get VQ scan once pain under control echo    #Abd pain - probably referred pain  - c/w pain meds  - colace, senna for constipation    #RA  - c/w prednisone  - out pt F/u with rhematoligy    #Thrombocytopenia + Microcytic anemia  - H/o hemorrhoids S/p hemorrhoidectomy  - Iron profile, ferritin  - Stool occult blood.  - hematology eval appreciated  - repeat CBC, f./u with hem     Plan discussed with Housestaff, pt's daughter. Patient seen and examined independently. Agree with resident note with exceptions.    #New compression fracture T11 and 12  - CT Abdomen and Pelvis w/ IV Cont (09.27.18 @ 00:32) New T11 and T12 moderate compression deformities. Unchanged moderate compression deformities of L3, L4 post kyphoplasty of L4.  - Neurosurgery eval: get MR brain, spine (TLS) under anesthesia (discussed w/ anesthesia, will be done sometime next week)  - pain control for back pain      #Acute DVT left femoral  - C/w lovenox  - Patient could net get VQ scan due to pain while lying back , not tachycardic, O2 >95 on RA, get VQ scan once pain under control echo    #Abd pain - probably referred pain  - c/w pain meds  - colace, senna for constipation    # troponemia:  - patient denies angina or shortness of breath  - d/w cardiac fellow, no intervention in setting of asymptomatic troponemia, possibly demand 2/2 anemia  - f/u third set CE    #RA  - c/w prednisone  - out pt F/u with rheumatology    #Thrombocytopenia + Microcytic anemia  - H/o hemorrhoids S/p hemorrhoidectomy  - Iron profile, ferritin  - Stool occult blood.  - hematology eval appreciated  - repeat CBC, f./u with hem

## 2018-09-28 NOTE — PROGRESS NOTE ADULT - SUBJECTIVE AND OBJECTIVE BOX
Patient is a 68y old  Female who presents with a chief complaint of mid back pain of 4 days (28 Sep 2018 15:32)        Over Night Events:        ROS:  See HPI    PHYSICAL EXAM    ICU Vital Signs Last 24 Hrs  T(C): 36.6 (28 Sep 2018 16:05), Max: 36.7 (28 Sep 2018 00:01)  T(F): 97.9 (28 Sep 2018 16:05), Max: 98 (28 Sep 2018 00:01)  HR: 79 (28 Sep 2018 16:05) (72 - 79)  BP: 101/59 (28 Sep 2018 16:05) (101/59 - 159/85)  BP(mean): --  ABP: --  ABP(mean): --  RR: 19 (28 Sep 2018 16:05) (18 - 20)  SpO2: 97% (28 Sep 2018 16:05) (67% - 97%)      General:  HEENT: SHIV             Lymphatic system: No cervical LN   Lungs: Bilateral BS  Cardiovascular: Regular   Gastrointestinal: Soft, Positive BS  Extremities: No clubbing.  Moves extremities.  Full Range of motion   Skin: Warm, intact  Neurological: No motor or sensory deficit         LABS:                            9.9    11.66 )-----------( 72       ( 28 Sep 2018 07:32 )             33.5                                               09-28    140  |  101  |  27<H>  ----------------------------<  126<H>  4.3   |  22  |  1.1    Ca    9.0      28 Sep 2018 07:32  Mg     2.0     09-28    TPro  6.0  /  Alb  4.0  /  TBili  0.3  /  DBili  x   /  AST  14  /  ALT  8   /  AlkPhos  73  09-26      PT/INR - ( 27 Sep 2018 09:57 )   PT: 10.60 sec;   INR: 0.98 ratio         PTT - ( 27 Sep 2018 09:57 )  PTT:24.2 sec                                       Urinalysis Basic - ( 27 Sep 2018 00:30 )    Color: Yellow / Appearance: Clear / S.015 / pH: x  Gluc: x / Ketone: Negative  / Bili: Negative / Urobili: 0.2 mg/dL   Blood: x / Protein: Negative mg/dL / Nitrite: Negative   Leuk Esterase: Negative / RBC: x / WBC x   Sq Epi: x / Non Sq Epi: x / Bacteria: x        CARDIAC MARKERS ( 28 Sep 2018 11:33 )  x     / 0.07 ng/mL / x     / x     / x      CARDIAC MARKERS ( 28 Sep 2018 07:32 )  x     / 0.05 ng/mL / 36 U/L / x     / 3.2 ng/mL                                            LIVER FUNCTIONS - ( 26 Sep 2018 20:37 )  Alb: 4.0 g/dL / Pro: 6.0 g/dL / ALK PHOS: 73 U/L / ALT: 8 U/L / AST: 14 U/L / GGT: x                                                                                                                                       MEDICATIONS  (STANDING):  atorvastatin 40 milliGRAM(s) Oral at bedtime  clopidogrel Tablet 75 milliGRAM(s) Oral daily  dextrose 5%. 1000 milliLiter(s) (50 mL/Hr) IV Continuous <Continuous>  dextrose 50% Injectable 12.5 Gram(s) IV Push once  dextrose 50% Injectable 25 Gram(s) IV Push once  dextrose 50% Injectable 25 Gram(s) IV Push once  enoxaparin Injectable 50 milliGRAM(s) SubCutaneous every 12 hours  furosemide    Tablet 40 milliGRAM(s) Oral daily  gabapentin 400 milliGRAM(s) Oral three times a day  HYDROmorphone  Injectable 1 milliGRAM(s) IV Push once  insulin glargine Injectable (LANTUS) 25 Unit(s) SubCutaneous two times a day  insulin lispro (HumaLOG) corrective regimen sliding scale   SubCutaneous three times a day before meals  insulin lispro Injectable (HumaLOG) 7 Unit(s) SubCutaneous three times a day before meals  losartan 50 milliGRAM(s) Oral daily  pantoprazole    Tablet 40 milliGRAM(s) Oral before breakfast  predniSONE   Tablet 5 milliGRAM(s) Oral three times a day    MEDICATIONS  (PRN):  acetaminophen   Tablet .. 650 milliGRAM(s) Oral every 6 hours PRN Mild Pain (1 - 3)  dextrose 40% Gel 15 Gram(s) Oral once PRN Blood Glucose LESS THAN 70 milliGRAM(s)/deciliter  glucagon  Injectable 1 milliGRAM(s) IntraMuscular once PRN Glucose LESS THAN 70 milligrams/deciliter  morphine  - Injectable 2 milliGRAM(s) IV Push every 4 hours PRN Severe Pain (7 - 10)  traMADol 50 milliGRAM(s) Oral three times a day PRN Moderate Pain (4 - 6)      Xrays:                                                                                     ECHO Patient is a 68y old  Female who presents with a chief complaint of mid back pain of 4 days (28 Sep 2018 15:32)        Over Night Events: Patient still complaining about back pain that's radiating from the middle of her back to bilateral flanks. She denies chest pain or shortness of breath. She also has bloating that's "been going on for years" and worsens with food.        ROS:  See HPI    PHYSICAL EXAM    ICU Vital Signs Last 24 Hrs  T(C): 36.6 (28 Sep 2018 16:05), Max: 36.7 (28 Sep 2018 00:01)  T(F): 97.9 (28 Sep 2018 16:05), Max: 98 (28 Sep 2018 00:01)  HR: 79 (28 Sep 2018 16:05) (72 - 79)  BP: 101/59 (28 Sep 2018 16:05) (101/59 - 159/85)  BP(mean): --  ABP: --  ABP(mean): --  RR: 19 (28 Sep 2018 16:05) (18 - 20)  SpO2: 97% (28 Sep 2018 16:05) (67% - 97%)      General: NAD  HEENT: SHIV             Lymphatic system: No cervical LN   Lungs: Bilateral BS  Cardiovascular: Regular   Gastrointestinal: Soft, Positive BS  Extremities: Tender spine in midline. No clubbing.  Moves extremities.  Full Range of motion   Skin: Warm, intact  Neurological: No motor or sensory deficit         LABS:                            9.9    11.66 )-----------( 72       ( 28 Sep 2018 07:32 )             33.5                                               09-28    140  |  101  |  27<H>  ----------------------------<  126<H>  4.3   |  22  |  1.1    Ca    9.0      28 Sep 2018 07:32  Mg     2.0     09-28    TPro  6.0  /  Alb  4.0  /  TBili  0.3  /  DBili  x   /  AST  14  /  ALT  8   /  AlkPhos  73  09-26      PT/INR - ( 27 Sep 2018 09:57 )   PT: 10.60 sec;   INR: 0.98 ratio         PTT - ( 27 Sep 2018 09:57 )  PTT:24.2 sec                                       Urinalysis Basic - ( 27 Sep 2018 00:30 )    Color: Yellow / Appearance: Clear / S.015 / pH: x  Gluc: x / Ketone: Negative  / Bili: Negative / Urobili: 0.2 mg/dL   Blood: x / Protein: Negative mg/dL / Nitrite: Negative   Leuk Esterase: Negative / RBC: x / WBC x   Sq Epi: x / Non Sq Epi: x / Bacteria: x        CARDIAC MARKERS ( 28 Sep 2018 11:33 )  x     / 0.07 ng/mL / x     / x     / x      CARDIAC MARKERS ( 28 Sep 2018 07:32 )  x     / 0.05 ng/mL / 36 U/L / x     / 3.2 ng/mL                                            LIVER FUNCTIONS - ( 26 Sep 2018 20:37 )  Alb: 4.0 g/dL / Pro: 6.0 g/dL / ALK PHOS: 73 U/L / ALT: 8 U/L / AST: 14 U/L / GGT: x                                                                                                                                       MEDICATIONS  (STANDING):  atorvastatin 40 milliGRAM(s) Oral at bedtime  clopidogrel Tablet 75 milliGRAM(s) Oral daily  dextrose 5%. 1000 milliLiter(s) (50 mL/Hr) IV Continuous <Continuous>  dextrose 50% Injectable 12.5 Gram(s) IV Push once  dextrose 50% Injectable 25 Gram(s) IV Push once  dextrose 50% Injectable 25 Gram(s) IV Push once  enoxaparin Injectable 50 milliGRAM(s) SubCutaneous every 12 hours  furosemide    Tablet 40 milliGRAM(s) Oral daily  gabapentin 400 milliGRAM(s) Oral three times a day  HYDROmorphone  Injectable 1 milliGRAM(s) IV Push once  insulin glargine Injectable (LANTUS) 25 Unit(s) SubCutaneous two times a day  insulin lispro (HumaLOG) corrective regimen sliding scale   SubCutaneous three times a day before meals  insulin lispro Injectable (HumaLOG) 7 Unit(s) SubCutaneous three times a day before meals  losartan 50 milliGRAM(s) Oral daily  pantoprazole    Tablet 40 milliGRAM(s) Oral before breakfast  predniSONE   Tablet 5 milliGRAM(s) Oral three times a day    MEDICATIONS  (PRN):  acetaminophen   Tablet .. 650 milliGRAM(s) Oral every 6 hours PRN Mild Pain (1 - 3)  dextrose 40% Gel 15 Gram(s) Oral once PRN Blood Glucose LESS THAN 70 milliGRAM(s)/deciliter  glucagon  Injectable 1 milliGRAM(s) IntraMuscular once PRN Glucose LESS THAN 70 milligrams/deciliter  morphine  - Injectable 2 milliGRAM(s) IV Push every 4 hours PRN Severe Pain (7 - 10)  traMADol 50 milliGRAM(s) Oral three times a day PRN Moderate Pain (4 - 6)      Xrays:                                                                                     ECHO

## 2018-09-28 NOTE — CONSULT NOTE ADULT - SUBJECTIVE AND OBJECTIVE BOX
HPI:  69 yo female patient with PMHx HTN, DM II, CAD/CABG x2 (last in ), DL, RA on chronic prednisone 15mg daily for 30 years complicated by osteoporosis and vertebral fractures s/p kyphoplasty 3 months ago, anemia, presenting because of worsening mid back pain and abdominal pain over the last 4 days.  Patient reports mid back severe pain, started spontaneously 4 days ago, no back trauma or injury. Pain is sharp, poorly relieved by tylenol. Unable to lay flat, she says she's been sleeping sitting on a sofa. Later on, she started experiencing abdominal pain, colicky, mainly at the RUQ, moderate in intensity, non radiating, not related to food intake, no nausea or vomiting or diarrhea associated, no fever or chills. No other complaints.  Note that patient has had decreased physical activity it's been around 9 months since she had the vertebral fractures. She spends most of her time in the sofa. (27 Sep 2018 09:59)    PAST MEDICAL & SURGICAL HISTORY:  Anemia  Osteoporosis  Rheumatoid arthritis  Dyslipidemia  HTN (hypertension)  CAD (coronary artery disease)  Diabetes  History of hemorrhoidectomy    FAMILY HISTORY:  Family history of thalassemia (Sibling)  Family history of coronary artery disease (Father)    Allergies  penicillin (Anaphylaxis; Angioedema)    Home Medications:  atorvastatin 40 mg oral tablet: 1 tab(s) orally once a day (at bedtime) (27 Sep 2018 10:09)  gabapentin 400 mg oral tablet: 1 tab(s) orally 2 times a day (27 Sep 2018 10:08)  isosorbide mononitrate 10 mg oral tablet: 1 tab(s) orally once a day (27 Sep 2018 10:09)  Januvia 100 mg oral tablet: 1 tab(s) orally once a day (27 Sep 2018 10:11)  Lasix 20 mg oral tablet: 1 tab(s) orally once a day, As Needed (27 Sep 2018 10:07)  Levemir FlexPen 100 units/mL subcutaneous solution: 30 unit(s) subcutaneous 2 times a day (27 Sep 2018 10:11)  losartan 50 mg oral tablet: 1 tab(s) orally once a day (27 Sep 2018 10:11)  metFORMIN 500 mg oral tablet: 2 tab(s) orally 3 times a day (2 in AM, 2 at noon, 1 at night) (27 Sep 2018 10:08)  omeprazole 20 mg oral delayed release capsule: 1 cap(s) orally once a day (27 Sep 2018 10:09)  Plavix 75 mg oral tablet: 1 tab(s) orally once a day (27 Sep 2018 10:10)  predniSONE 5 mg oral tablet: 1 tab(s) orally 3 times a day (27 Sep 2018 10:10)  raNITIdine 150 mg oral tablet: 1 tab(s) orally 2 times a day (27 Sep 2018 10:10)    MEDICATIONS  (STANDING):  atorvastatin 40 milliGRAM(s) Oral at bedtime  clopidogrel Tablet 75 milliGRAM(s) Oral daily  dextrose 5%. 1000 milliLiter(s) (50 mL/Hr) IV Continuous <Continuous>  dextrose 50% Injectable 12.5 Gram(s) IV Push once  dextrose 50% Injectable 25 Gram(s) IV Push once  dextrose 50% Injectable 25 Gram(s) IV Push once  enoxaparin Injectable 50 milliGRAM(s) SubCutaneous every 12 hours  furosemide    Tablet 40 milliGRAM(s) Oral daily  gabapentin 400 milliGRAM(s) Oral three times a day  insulin glargine Injectable (LANTUS) 25 Unit(s) SubCutaneous two times a day  insulin lispro (HumaLOG) corrective regimen sliding scale   SubCutaneous three times a day before meals  insulin lispro Injectable (HumaLOG) 7 Unit(s) SubCutaneous three times a day before meals  losartan 50 milliGRAM(s) Oral daily  pantoprazole    Tablet 40 milliGRAM(s) Oral before breakfast  predniSONE   Tablet 5 milliGRAM(s) Oral three times a day    MEDICATIONS  (PRN):  acetaminophen   Tablet .. 650 milliGRAM(s) Oral every 6 hours PRN Mild Pain (1 - 3)  dextrose 40% Gel 15 Gram(s) Oral once PRN Blood Glucose LESS THAN 70 milliGRAM(s)/deciliter  glucagon  Injectable 1 milliGRAM(s) IntraMuscular once PRN Glucose LESS THAN 70 milligrams/deciliter  morphine  - Injectable 2 milliGRAM(s) IV Push every 4 hours PRN Severe Pain (7 - 10)  traMADol 50 milliGRAM(s) Oral three times a day PRN Moderate Pain (4 - 6)    Labs:                        9.9    11.66 )-----------( 72       ( 28 Sep 2018 07:32 )             33.5             09-28               9.9<L>  11.66<H> )-----------( 72<L>    (  @ 07:32 )             33.5<L>               9.9<L>  15.30<H> )-----------( 83<L>    (  @ 09:57 )             33.2<L>               9.0<L>  12.28<H> )-----------( 57<L>    (  @ 20:37 )             30.0<L>        140  |  101  |  27<H>  ----------------------------<  126<H>  4.3   |  22  |  1.1    Lipase, Serum (18 @ 20:37)    Lipase, Serum: 156 U/L    Ca    9.0      28 Sep 2018 07:32  Mg     2.0         TPro  6.0  /  Alb  4.0  /  TBili  0.3  /  DBili  x   /  AST  14  /  ALT  8   /  AlkPhos  73      LIVER FUNCTIONS - ( 26 Sep 2018 20:37 )  Alb: 4.0 g/dL / Pro: 6.0 g/dL / ALK PHOS: 73 U/L / ALT: 8 U/L / AST: 14 U/L / GGT: x                 PT/INR - ( 27 Sep 2018 09:57 )   PT: 10.60 sec;   INR: 0.98 ratio         PTT - ( 27 Sep 2018 09:57 )  PTT:24.2 sec  CARDIAC MARKERS ( 28 Sep 2018 07:32 )  x     / 0.05 ng/mL / 36 U/L / x     / 3.2 ng/mL        Urinalysis Basic - ( 27 Sep 2018 00:30 )    Color: Yellow / Appearance: Clear / S.015 / pH: x  Gluc: x / Ketone: Negative  / Bili: Negative / Urobili: 0.2 mg/dL   Blood: x / Protein: Negative mg/dL / Nitrite: Negative   Leuk Esterase: Negative / RBC: x / WBC x   Sq Epi: x / Non Sq Epi: x / Bacteria: x          < from: Xray Chest 1 View- PORTABLE-Routine (18 @ 11:36) >  Impression:      No radiographic evidence of acute cardiopulmonary disease.    < end of copied text >    < from: CT Abdomen and Pelvis w/ IV Cont (18 @ 00:32) >    IMPRESSION:     Hypodensity within the left femoral vein suspicious for DVT. Correlation   with doppler ultrasound recommended.    New T11 and T12 moderate compression deformities. Unchanged moderate   compression deformities of L3, L4 post kyphoplasty of L4.    No evidence of acute intra-abdominal pathology.    < end of copied text > HPI:  67 yo female patient with PMHx HTN, DM II, CAD/CABG x2 (last in ), DL, RA on chronic prednisone 15mg daily for 30 years complicated by osteoporosis and vertebral fractures s/p kyphoplasty 3 months ago, anemia, presenting because of worsening mid back pain and abdominal pain over the last 4 days.  Patient reports mid back severe pain, started spontaneously 4 days PTP, no back trauma or injury. Pain is sharp, poorly relieved by tylenol. Unable to lay flat, she says she's been sleeping sitting on a sofa. Later on, she started experiencing abdominal pain, colicky, mainly at the RUQ, moderate in intensity, non radiating, not related to food intake, no nausea or vomiting or diarrhea associated, no fever or chills. No other complaints.  Note that patient has had decreased physical activity it's been around 9 months since she had the vertebral fractures. She spends most of her time in the sofa.     She denies having any focal LE weakness, although has had longstanding pain which is now worse in left calf, as well as chronic numbness. She has had bowel incontinence since her hemorrhoidectomy. No urinary incontinence. She also c/o abdominal bloating and discomfort. Ongoing constipation. Last BM was 2 days back.    Hematologic history:  Pt is well known to Dr Maza. She has been seeing Dr Maza for management of microcytic anemia. She  has thalassemia minor and her baseline Hb is 9-10. She also had some iron deficiency in the past mainly from ?GI bleeding for which she had received ferritin with no significant response. Her folate, B12, haptoglobin and SPEP were WNL. Her platelet counts in 2018 was 298 and WBC was 13.22 with monocyte count of 1.63 (12.6%). Her last EGD was a year ago in [place] and it showed gastric ulcer. Her last flow was negative.      PAST MEDICAL & SURGICAL HISTORY:  Anemia  Osteoporosis  Rheumatoid arthritis  Dyslipidemia  HTN (hypertension)  CAD (coronary artery disease)  Diabetes  History of hemorrhoidectomy    FAMILY HISTORY:  Family history of thalassemia (Sibling)  Family history of coronary artery disease (Father)    Allergies  penicillin (Anaphylaxis; Angioedema)    Home Medications:  atorvastatin 40 mg oral tablet: 1 tab(s) orally once a day (at bedtime) (27 Sep 2018 10:09)  gabapentin 400 mg oral tablet: 1 tab(s) orally 2 times a day (27 Sep 2018 10:08)  isosorbide mononitrate 10 mg oral tablet: 1 tab(s) orally once a day (27 Sep 2018 10:09)  Januvia 100 mg oral tablet: 1 tab(s) orally once a day (27 Sep 2018 10:11)  Lasix 20 mg oral tablet: 1 tab(s) orally once a day, As Needed (27 Sep 2018 10:07)  Levemir FlexPen 100 units/mL subcutaneous solution: 30 unit(s) subcutaneous 2 times a day (27 Sep 2018 10:11)  losartan 50 mg oral tablet: 1 tab(s) orally once a day (27 Sep 2018 10:11)  metFORMIN 500 mg oral tablet: 2 tab(s) orally 3 times a day (2 in AM, 2 at noon, 1 at night) (27 Sep 2018 10:08)  omeprazole 20 mg oral delayed release capsule: 1 cap(s) orally once a day (27 Sep 2018 10:09)  Plavix 75 mg oral tablet: 1 tab(s) orally once a day (27 Sep 2018 10:10)  predniSONE 5 mg oral tablet: 1 tab(s) orally 3 times a day (27 Sep 2018 10:10)  raNITIdine 150 mg oral tablet: 1 tab(s) orally 2 times a day (27 Sep 2018 10:10)    MEDICATIONS  (STANDING):  atorvastatin 40 milliGRAM(s) Oral at bedtime  clopidogrel Tablet 75 milliGRAM(s) Oral daily  dextrose 5%. 1000 milliLiter(s) (50 mL/Hr) IV Continuous <Continuous>  dextrose 50% Injectable 12.5 Gram(s) IV Push once  dextrose 50% Injectable 25 Gram(s) IV Push once  dextrose 50% Injectable 25 Gram(s) IV Push once  enoxaparin Injectable 50 milliGRAM(s) SubCutaneous every 12 hours  furosemide    Tablet 40 milliGRAM(s) Oral daily  gabapentin 400 milliGRAM(s) Oral three times a day  insulin glargine Injectable (LANTUS) 25 Unit(s) SubCutaneous two times a day  insulin lispro (HumaLOG) corrective regimen sliding scale   SubCutaneous three times a day before meals  insulin lispro Injectable (HumaLOG) 7 Unit(s) SubCutaneous three times a day before meals  losartan 50 milliGRAM(s) Oral daily  pantoprazole    Tablet 40 milliGRAM(s) Oral before breakfast  predniSONE   Tablet 5 milliGRAM(s) Oral three times a day    MEDICATIONS  (PRN):  acetaminophen   Tablet .. 650 milliGRAM(s) Oral every 6 hours PRN Mild Pain (1 - 3)  dextrose 40% Gel 15 Gram(s) Oral once PRN Blood Glucose LESS THAN 70 milliGRAM(s)/deciliter  glucagon  Injectable 1 milliGRAM(s) IntraMuscular once PRN Glucose LESS THAN 70 milligrams/deciliter  morphine  - Injectable 2 milliGRAM(s) IV Push every 4 hours PRN Severe Pain (7 - 10)  traMADol 50 milliGRAM(s) Oral three times a day PRN Moderate Pain (4 - 6)    Labs:                        9.9    11.66 )-----------( 72       ( 28 Sep 2018 07:32 )             33.5                            9.9<L>  11.66<H> )-----------( 72<L>    (  @ 07:32 )             33.5<L>               9.9<L>  15.30<H> )-----------( 83<L>    (  @ 09:57 )             33.2<L>               9.0<L>  12.28<H> )-----------( 57<L>    (  @ 20:37 )             30.0<L>        140  |  101  |  27<H>  ----------------------------<  126<H>  4.3   |  22  |  1.1    Lipase, Serum (. @ 20:37)    Lipase, Serum: 156 U/L    Ca    9.0      28 Sep 2018 07:32  Mg     2.0         TPro  6.0  /  Alb  4.0  /  TBili  0.3  /  DBili  x   /  AST  14  /  ALT  8   /  AlkPhos  73      LIVER FUNCTIONS - ( 26 Sep 2018 20:37 )  Alb: 4.0 g/dL / Pro: 6.0 g/dL / ALK PHOS: 73 U/L / ALT: 8 U/L / AST: 14 U/L / GGT: x                 PT/INR - ( 27 Sep 2018 09:57 )   PT: 10.60 sec;   INR: 0.98 ratio         PTT - ( 27 Sep 2018 09:57 )  PTT:24.2 sec  CARDIAC MARKERS ( 28 Sep 2018 07:32 )  x     / 0.05 ng/mL / 36 U/L / x     / 3.2 ng/mL        Urinalysis Basic - ( 27 Sep 2018 00:30 )    Color: Yellow / Appearance: Clear / S.015 / pH: x  Gluc: x / Ketone: Negative  / Bili: Negative / Urobili: 0.2 mg/dL   Blood: x / Protein: Negative mg/dL / Nitrite: Negative   Leuk Esterase: Negative / RBC: x / WBC x   Sq Epi: x / Non Sq Epi: x / Bacteria: x          < from: Xray Chest 1 View- PORTABLE-Routine (18 @ 11:36) >  Impression:      No radiographic evidence of acute cardiopulmonary disease.    < end of copied text >    < from: CT Abdomen and Pelvis w/ IV Cont (18 @ 00:32) >    IMPRESSION:     Hypodensity within the left femoral vein suspicious for DVT. Correlation   with doppler ultrasound recommended.    New T11 and T12 moderate compression deformities. Unchanged moderate   compression deformities of L3, L4 post kyphoplasty of L4.    No evidence of acute intra-abdominal pathology.    < end of copied text > HPI:  69 yo female patient with PMHx HTN, DM II, CAD/CABG x2 (last in ), DL, RA on chronic prednisone 15mg daily for 30 years complicated by osteoporosis and vertebral fractures s/p kyphoplasty 3 months ago, anemia, presenting because of worsening mid back pain and abdominal pain over the last 4 days.  Patient reports mid back severe pain, started spontaneously 4 days PTP, no back trauma or injury. Pain is sharp, poorly relieved by tylenol. Unable to lay flat, she says she's been sleeping sitting on a sofa. Later on, she started experiencing abdominal pain, colicky, mainly at the RUQ, moderate in intensity, non radiating, not related to food intake, no nausea or vomiting or diarrhea associated, no fever or chills. No other complaints.  Note that patient has had decreased physical activity it's been around 9 months since she had the vertebral fractures. She spends most of her time in the sofa.     She denies having any focal LE weakness, although has had longstanding pain which is now worse in left calf, as well as chronic numbness. She has had bowel incontinence since her hemorrhoidectomy. No urinary incontinence. She also c/o abdominal bloating and discomfort. Ongoing constipation. Last BM was 2 days back.    Hematologic history:  Pt is well known to Dr Maza. She has been seeing Dr Maza for management of microcytic anemia. She  has thalassemia minor and her baseline Hb is 9-10. She also had some iron deficiency in the past mainly from ?GI bleeding for which she had received Venofer with no significant response. Her folate, B12, haptoglobin and SPEP were WNL. Her platelet counts in 2018 was 298 and WBC was 13.22 with monocyte count of 1.63 (12.6%). Her last EGD was a year ago in [place] and it showed gastric ulcer. Her last flow was negative.      PAST MEDICAL & SURGICAL HISTORY:  Anemia  Osteoporosis  Rheumatoid arthritis  Dyslipidemia  HTN (hypertension)  CAD (coronary artery disease)  Diabetes  History of hemorrhoidectomy    FAMILY HISTORY:  Family history of thalassemia (Sibling)  Family history of coronary artery disease (Father)    Allergies  penicillin (Anaphylaxis; Angioedema)    Home Medications:  atorvastatin 40 mg oral tablet: 1 tab(s) orally once a day (at bedtime) (27 Sep 2018 10:09)  gabapentin 400 mg oral tablet: 1 tab(s) orally 2 times a day (27 Sep 2018 10:08)  isosorbide mononitrate 10 mg oral tablet: 1 tab(s) orally once a day (27 Sep 2018 10:09)  Januvia 100 mg oral tablet: 1 tab(s) orally once a day (27 Sep 2018 10:11)  Lasix 20 mg oral tablet: 1 tab(s) orally once a day, As Needed (27 Sep 2018 10:07)  Levemir FlexPen 100 units/mL subcutaneous solution: 30 unit(s) subcutaneous 2 times a day (27 Sep 2018 10:11)  losartan 50 mg oral tablet: 1 tab(s) orally once a day (27 Sep 2018 10:11)  metFORMIN 500 mg oral tablet: 2 tab(s) orally 3 times a day (2 in AM, 2 at noon, 1 at night) (27 Sep 2018 10:08)  omeprazole 20 mg oral delayed release capsule: 1 cap(s) orally once a day (27 Sep 2018 10:09)  Plavix 75 mg oral tablet: 1 tab(s) orally once a day (27 Sep 2018 10:10)  predniSONE 5 mg oral tablet: 1 tab(s) orally 3 times a day (27 Sep 2018 10:10)  raNITIdine 150 mg oral tablet: 1 tab(s) orally 2 times a day (27 Sep 2018 10:10)    MEDICATIONS  (STANDING):  atorvastatin 40 milliGRAM(s) Oral at bedtime  clopidogrel Tablet 75 milliGRAM(s) Oral daily  dextrose 5%. 1000 milliLiter(s) (50 mL/Hr) IV Continuous <Continuous>  dextrose 50% Injectable 12.5 Gram(s) IV Push once  dextrose 50% Injectable 25 Gram(s) IV Push once  dextrose 50% Injectable 25 Gram(s) IV Push once  enoxaparin Injectable 50 milliGRAM(s) SubCutaneous every 12 hours  furosemide    Tablet 40 milliGRAM(s) Oral daily  gabapentin 400 milliGRAM(s) Oral three times a day  insulin glargine Injectable (LANTUS) 25 Unit(s) SubCutaneous two times a day  insulin lispro (HumaLOG) corrective regimen sliding scale   SubCutaneous three times a day before meals  insulin lispro Injectable (HumaLOG) 7 Unit(s) SubCutaneous three times a day before meals  losartan 50 milliGRAM(s) Oral daily  pantoprazole    Tablet 40 milliGRAM(s) Oral before breakfast  predniSONE   Tablet 5 milliGRAM(s) Oral three times a day    MEDICATIONS  (PRN):  acetaminophen   Tablet .. 650 milliGRAM(s) Oral every 6 hours PRN Mild Pain (1 - 3)  dextrose 40% Gel 15 Gram(s) Oral once PRN Blood Glucose LESS THAN 70 milliGRAM(s)/deciliter  glucagon  Injectable 1 milliGRAM(s) IntraMuscular once PRN Glucose LESS THAN 70 milligrams/deciliter  morphine  - Injectable 2 milliGRAM(s) IV Push every 4 hours PRN Severe Pain (7 - 10)  traMADol 50 milliGRAM(s) Oral three times a day PRN Moderate Pain (4 - 6)    Labs:                        9.9    11.66 )-----------( 72       ( 28 Sep 2018 07:32 )             33.5                            9.9<L>  11.66<H> )-----------( 72<L>    (  @ 07:32 )             33.5<L>               9.9<L>  15.30<H> )-----------( 83<L>    (  @ 09:57 )             33.2<L>               9.0<L>  12.28<H> )-----------( 57<L>    (  @ 20:37 )             30.0<L>        140  |  101  |  27<H>  ----------------------------<  126<H>  4.3   |  22  |  1.1    Lipase, Serum (. @ 20:37)    Lipase, Serum: 156 U/L    Ca    9.0      28 Sep 2018 07:32  Mg     2.0         TPro  6.0  /  Alb  4.0  /  TBili  0.3  /  DBili  x   /  AST  14  /  ALT  8   /  AlkPhos  73      LIVER FUNCTIONS - ( 26 Sep 2018 20:37 )  Alb: 4.0 g/dL / Pro: 6.0 g/dL / ALK PHOS: 73 U/L / ALT: 8 U/L / AST: 14 U/L / GGT: x                 PT/INR - ( 27 Sep 2018 09:57 )   PT: 10.60 sec;   INR: 0.98 ratio         PTT - ( 27 Sep 2018 09:57 )  PTT:24.2 sec  CARDIAC MARKERS ( 28 Sep 2018 07:32 )  x     / 0.05 ng/mL / 36 U/L / x     / 3.2 ng/mL        Urinalysis Basic - ( 27 Sep 2018 00:30 )    Color: Yellow / Appearance: Clear / S.015 / pH: x  Gluc: x / Ketone: Negative  / Bili: Negative / Urobili: 0.2 mg/dL   Blood: x / Protein: Negative mg/dL / Nitrite: Negative   Leuk Esterase: Negative / RBC: x / WBC x   Sq Epi: x / Non Sq Epi: x / Bacteria: x          < from: Xray Chest 1 View- PORTABLE-Routine (18 @ 11:36) >  Impression:      No radiographic evidence of acute cardiopulmonary disease.    < end of copied text >    < from: CT Abdomen and Pelvis w/ IV Cont (18 @ 00:32) >    IMPRESSION:     Hypodensity within the left femoral vein suspicious for DVT. Correlation   with doppler ultrasound recommended.    New T11 and T12 moderate compression deformities. Unchanged moderate   compression deformities of L3, L4 post kyphoplasty of L4.    No evidence of acute intra-abdominal pathology.    < end of copied text >

## 2018-09-28 NOTE — CONSULT NOTE ADULT - ASSESSMENT
69 y/o with multiple medical co-morbidities including anemia, thal minor, osteoporosis, DM, HTN, gastric ulcer, CAD, RA on prednisone p/w back pain and abdominal pain. Found to have new compression deformities as well as new left common femoral vein DVT and new thrombocytopenia. Hematology consulted for thrombocytopenia    #Thrombocytopenia: Probably related to consumption from DVT d/t acuity of drop in counts.  --Repeat CBC, if the counts are persistently low, will do a complete w/u including BM biopsy  --Will review peripheral smear    #DVT: Probably provoked from decreased mobility  --Will continue with lovenox 1mg/kg BID. Safe to anticoagulate at this platelet count    #Anemia: Secondary to thalassemia minor  --HB close to baseline  --Check guaiac    #Compression deformities  --Pain medication  --Neurosurgery eval    #Abdominal bloating/constipation  --Senna, colace and miralax    #Gi ppx

## 2018-09-28 NOTE — CONSULT NOTE ADULT - ASSESSMENT
69 yo Right handed  female with PMHx shingles 1 year ago, HTN, DM II, CAD/CABG x2 (last in 2013), DL, RA on chronic prednisone 15mg daily for 30 years complicated by osteoporosis and vertebral fractures s/p kyphoplasty 3 months ago, anemia, presenting because of worsening mid back pain and abdominal pain over the last 4 days. Found to have a New T11 and T12 moderate compression deformities.    Plan  NEURO SURGERY EVAL  N/C MRI OF THORACIC AND LUMBAR SPINE   Neuro attending note will follow

## 2018-09-29 LAB
ANION GAP SERPL CALC-SCNC: 21 MMOL/L — HIGH (ref 7–14)
BASOPHILS # BLD AUTO: 0.01 K/UL — SIGNIFICANT CHANGE UP (ref 0–0.2)
BASOPHILS NFR BLD AUTO: 0.1 % — SIGNIFICANT CHANGE UP (ref 0–1)
BUN SERPL-MCNC: 31 MG/DL — HIGH (ref 10–20)
CALCIUM SERPL-MCNC: 8.8 MG/DL — SIGNIFICANT CHANGE UP (ref 8.5–10.1)
CHLORIDE SERPL-SCNC: 95 MMOL/L — LOW (ref 98–110)
CO2 SERPL-SCNC: 19 MMOL/L — SIGNIFICANT CHANGE UP (ref 17–32)
CREAT SERPL-MCNC: 1 MG/DL — SIGNIFICANT CHANGE UP (ref 0.7–1.5)
CRP SERPL-MCNC: 0.37 MG/DL — SIGNIFICANT CHANGE UP (ref 0–0.4)
EOSINOPHIL # BLD AUTO: 0.04 K/UL — SIGNIFICANT CHANGE UP (ref 0–0.7)
EOSINOPHIL NFR BLD AUTO: 0.4 % — SIGNIFICANT CHANGE UP (ref 0–8)
GLUCOSE BLDC GLUCOMTR-MCNC: 269 MG/DL — HIGH (ref 70–99)
GLUCOSE BLDC GLUCOMTR-MCNC: 271 MG/DL — HIGH (ref 70–99)
GLUCOSE BLDC GLUCOMTR-MCNC: 281 MG/DL — HIGH (ref 70–99)
GLUCOSE BLDC GLUCOMTR-MCNC: 322 MG/DL — HIGH (ref 70–99)
GLUCOSE BLDC GLUCOMTR-MCNC: 325 MG/DL — HIGH (ref 70–99)
GLUCOSE BLDC GLUCOMTR-MCNC: 362 MG/DL — HIGH (ref 70–99)
GLUCOSE SERPL-MCNC: 382 MG/DL — HIGH (ref 70–99)
HCT VFR BLD CALC: 31.7 % — LOW (ref 37–47)
HGB BLD-MCNC: 9.4 G/DL — LOW (ref 12–16)
IMM GRANULOCYTES NFR BLD AUTO: 1.7 % — HIGH (ref 0.1–0.3)
LYMPHOCYTES # BLD AUTO: 1.85 K/UL — SIGNIFICANT CHANGE UP (ref 1.2–3.4)
LYMPHOCYTES # BLD AUTO: 19.7 % — LOW (ref 20.5–51.1)
MAGNESIUM SERPL-MCNC: 1.8 MG/DL — SIGNIFICANT CHANGE UP (ref 1.8–2.4)
MCHC RBC-ENTMCNC: 18.6 PG — LOW (ref 27–31)
MCHC RBC-ENTMCNC: 29.7 G/DL — LOW (ref 32–37)
MCV RBC AUTO: 62.6 FL — LOW (ref 81–99)
MONOCYTES # BLD AUTO: 0.73 K/UL — HIGH (ref 0.1–0.6)
MONOCYTES NFR BLD AUTO: 7.8 % — SIGNIFICANT CHANGE UP (ref 1.7–9.3)
NEUTROPHILS # BLD AUTO: 6.58 K/UL — HIGH (ref 1.4–6.5)
NEUTROPHILS NFR BLD AUTO: 70.3 % — SIGNIFICANT CHANGE UP (ref 42.2–75.2)
PHOSPHATE SERPL-MCNC: 4.5 MG/DL — SIGNIFICANT CHANGE UP (ref 2.1–4.9)
PLATELET # BLD AUTO: 70 K/UL — LOW (ref 130–400)
POTASSIUM SERPL-MCNC: 4.6 MMOL/L — SIGNIFICANT CHANGE UP (ref 3.5–5)
POTASSIUM SERPL-SCNC: 4.6 MMOL/L — SIGNIFICANT CHANGE UP (ref 3.5–5)
RBC # BLD: 5.06 M/UL — SIGNIFICANT CHANGE UP (ref 4.2–5.4)
RBC # FLD: 17.4 % — HIGH (ref 11.5–14.5)
SODIUM SERPL-SCNC: 135 MMOL/L — SIGNIFICANT CHANGE UP (ref 135–146)
TROPONIN T SERPL-MCNC: 0.04 NG/ML — CRITICAL HIGH
TSH SERPL-MCNC: 0.48 UIU/ML — SIGNIFICANT CHANGE UP (ref 0.27–4.2)
WBC # BLD: 9.37 K/UL — SIGNIFICANT CHANGE UP (ref 4.8–10.8)
WBC # FLD AUTO: 9.37 K/UL — SIGNIFICANT CHANGE UP (ref 4.8–10.8)

## 2018-09-29 RX ADMIN — CLOPIDOGREL BISULFATE 75 MILLIGRAM(S): 75 TABLET, FILM COATED ORAL at 11:39

## 2018-09-29 RX ADMIN — Medication 4: at 11:41

## 2018-09-29 RX ADMIN — PANTOPRAZOLE SODIUM 40 MILLIGRAM(S): 20 TABLET, DELAYED RELEASE ORAL at 05:32

## 2018-09-29 RX ADMIN — ATORVASTATIN CALCIUM 40 MILLIGRAM(S): 80 TABLET, FILM COATED ORAL at 21:55

## 2018-09-29 RX ADMIN — GABAPENTIN 400 MILLIGRAM(S): 400 CAPSULE ORAL at 05:29

## 2018-09-29 RX ADMIN — MORPHINE SULFATE 2 MILLIGRAM(S): 50 CAPSULE, EXTENDED RELEASE ORAL at 22:39

## 2018-09-29 RX ADMIN — GABAPENTIN 400 MILLIGRAM(S): 400 CAPSULE ORAL at 21:58

## 2018-09-29 RX ADMIN — INSULIN GLARGINE 25 UNIT(S): 100 INJECTION, SOLUTION SUBCUTANEOUS at 10:19

## 2018-09-29 RX ADMIN — Medication 7 UNIT(S): at 17:17

## 2018-09-29 RX ADMIN — GABAPENTIN 400 MILLIGRAM(S): 400 CAPSULE ORAL at 14:21

## 2018-09-29 RX ADMIN — Medication 7 UNIT(S): at 11:42

## 2018-09-29 RX ADMIN — LOSARTAN POTASSIUM 50 MILLIGRAM(S): 100 TABLET, FILM COATED ORAL at 05:29

## 2018-09-29 RX ADMIN — TRAMADOL HYDROCHLORIDE 50 MILLIGRAM(S): 50 TABLET ORAL at 21:55

## 2018-09-29 RX ADMIN — Medication 3: at 08:27

## 2018-09-29 RX ADMIN — ENOXAPARIN SODIUM 50 MILLIGRAM(S): 100 INJECTION SUBCUTANEOUS at 05:32

## 2018-09-29 RX ADMIN — MORPHINE SULFATE 2 MILLIGRAM(S): 50 CAPSULE, EXTENDED RELEASE ORAL at 17:36

## 2018-09-29 RX ADMIN — INSULIN GLARGINE 25 UNIT(S): 100 INJECTION, SOLUTION SUBCUTANEOUS at 22:34

## 2018-09-29 RX ADMIN — ENOXAPARIN SODIUM 50 MILLIGRAM(S): 100 INJECTION SUBCUTANEOUS at 17:41

## 2018-09-29 RX ADMIN — Medication 40 MILLIGRAM(S): at 05:26

## 2018-09-29 RX ADMIN — Medication 7 UNIT(S): at 08:28

## 2018-09-29 RX ADMIN — Medication 5 MILLIGRAM(S): at 05:30

## 2018-09-29 RX ADMIN — Medication 5 MILLIGRAM(S): at 14:22

## 2018-09-29 RX ADMIN — Medication 5 MILLIGRAM(S): at 21:57

## 2018-09-29 RX ADMIN — Medication 4: at 17:16

## 2018-09-29 NOTE — PROGRESS NOTE ADULT - ASSESSMENT
68F PMHx HTN, DM2, CAD s/p CABG, RA on prednisone, osteoporosis, compression fx s/p kyphoplasty here with back pain due to new compression fx.    1. Compression fx, T11 and T12  mri with anes on monday  pain control  PT  2. DVT  therapeutic lovenox  3. Thrombocytopenia  consumptive  stable, f/u  4. RA  prednisone 5  outpt f/u  5. DM2  lantus 25 bid  humalog 7 tid   ssi  6. CAD  plavix  lipitor 40  7. DVT ppx  lovenox

## 2018-09-29 NOTE — PROGRESS NOTE ADULT - SUBJECTIVE AND OBJECTIVE BOX
MALLIKA WILLSON  68y  Female      Patient is a 68y old  Female who presents with a chief complaint of mid back pain of 4 days (28 Sep 2018 18:21)      INTERVAL HPI/OVERNIGHT EVENTS:  no cp, sob, abd pain, fever  back pain, sharp, nonradiating, no weakness    Vital Signs Last 24 Hrs  T(C): 35.8 (29 Sep 2018 07:15), Max: 36.6 (28 Sep 2018 16:05)  T(F): 96.5 (29 Sep 2018 07:15), Max: 97.9 (28 Sep 2018 16:05)  HR: 65 (29 Sep 2018 07:15) (65 - 79)  BP: 152/70 (29 Sep 2018 07:15) (101/59 - 161/86)  BP(mean): --  RR: 18 (29 Sep 2018 07:15) (18 - 19)  SpO2: 97% (28 Sep 2018 16:05) (97% - 97%)    PHYSICAL EXAM:  GENERAL: NAD, well-groomed, well-developed  HEAD:  Atraumatic, Normocephalic    CHEST/LUNG: Clear to auscultation bilaterally; No rales, rhonchi, wheezing, or rubs  HEART: Regular rate and rhythm; No murmurs, rubs, or gallops  ABDOMEN: Soft, Nontender, Nondistended; Bowel sounds present  EXTREMITIES:  2+ Peripheral Pulses, No clubbing, cyanosis, or edema      Consultant(s) Notes Reviewed:  [x ] YES  [ ] NO  Care Discussed with Consultants/Other Providers [ x] YES  [ ] NO    LABS:                        9.4    9.37  )-----------( 70       ( 29 Sep 2018 09:20 )             31.7     09-29    135  |  95<L>  |  31<H>  ----------------------------<  382<H>  4.6   |  19  |  1.0    Ca    8.8      29 Sep 2018 09:20  Phos  4.5     09-29  Mg     1.8     09-29            CAPILLARY BLOOD GLUCOSE  362 (29 Sep 2018 10:18)  271 (29 Sep 2018 08:23)      POCT Blood Glucose.: 325 mg/dL (29 Sep 2018 11:40)  POCT Blood Glucose.: 362 mg/dL (29 Sep 2018 10:18)  POCT Blood Glucose.: 271 mg/dL (29 Sep 2018 08:23)  POCT Blood Glucose.: 281 mg/dL (29 Sep 2018 06:52)  POCT Blood Glucose.: 285 mg/dL (28 Sep 2018 21:40)  POCT Blood Glucose.: 262 mg/dL (28 Sep 2018 18:42)      RADIOLOGY & ADDITIONAL TESTS:    Imaging Personally Reviewed:  [ ] YES  [ ] NO

## 2018-09-30 LAB
GLUCOSE BLDC GLUCOMTR-MCNC: 144 MG/DL — HIGH (ref 70–99)
GLUCOSE BLDC GLUCOMTR-MCNC: 178 MG/DL — HIGH (ref 70–99)
GLUCOSE BLDC GLUCOMTR-MCNC: 404 MG/DL — HIGH (ref 70–99)
GLUCOSE BLDC GLUCOMTR-MCNC: 428 MG/DL — HIGH (ref 70–99)
GLUCOSE BLDC GLUCOMTR-MCNC: 430 MG/DL — HIGH (ref 70–99)
GLUCOSE BLDC GLUCOMTR-MCNC: 450 MG/DL — CRITICAL HIGH (ref 70–99)
GLUCOSE BLDC GLUCOMTR-MCNC: 521 MG/DL — CRITICAL HIGH (ref 70–99)

## 2018-09-30 RX ORDER — INSULIN LISPRO 100/ML
4 VIAL (ML) SUBCUTANEOUS ONCE
Qty: 0 | Refills: 0 | Status: COMPLETED | OUTPATIENT
Start: 2018-09-30 | End: 2018-09-30

## 2018-09-30 RX ORDER — MORPHINE SULFATE 50 MG/1
2 CAPSULE, EXTENDED RELEASE ORAL
Qty: 0 | Refills: 0 | Status: DISCONTINUED | OUTPATIENT
Start: 2018-09-30 | End: 2018-10-01

## 2018-09-30 RX ORDER — INSULIN LISPRO 100/ML
6 VIAL (ML) SUBCUTANEOUS ONCE
Qty: 0 | Refills: 0 | Status: COMPLETED | OUTPATIENT
Start: 2018-09-30 | End: 2018-09-30

## 2018-09-30 RX ORDER — INSULIN LISPRO 100/ML
10 VIAL (ML) SUBCUTANEOUS ONCE
Qty: 0 | Refills: 0 | Status: COMPLETED | OUTPATIENT
Start: 2018-09-30 | End: 2018-09-30

## 2018-09-30 RX ADMIN — MORPHINE SULFATE 2 MILLIGRAM(S): 50 CAPSULE, EXTENDED RELEASE ORAL at 18:52

## 2018-09-30 RX ADMIN — INSULIN GLARGINE 25 UNIT(S): 100 INJECTION, SOLUTION SUBCUTANEOUS at 07:51

## 2018-09-30 RX ADMIN — Medication 5 MILLIGRAM(S): at 13:25

## 2018-09-30 RX ADMIN — LOSARTAN POTASSIUM 50 MILLIGRAM(S): 100 TABLET, FILM COATED ORAL at 05:36

## 2018-09-30 RX ADMIN — GABAPENTIN 400 MILLIGRAM(S): 400 CAPSULE ORAL at 05:39

## 2018-09-30 RX ADMIN — Medication 4 UNIT(S): at 12:20

## 2018-09-30 RX ADMIN — ENOXAPARIN SODIUM 50 MILLIGRAM(S): 100 INJECTION SUBCUTANEOUS at 17:02

## 2018-09-30 RX ADMIN — Medication 7 UNIT(S): at 17:04

## 2018-09-30 RX ADMIN — Medication 7 UNIT(S): at 11:20

## 2018-09-30 RX ADMIN — Medication 10 UNIT(S): at 14:49

## 2018-09-30 RX ADMIN — GABAPENTIN 400 MILLIGRAM(S): 400 CAPSULE ORAL at 21:43

## 2018-09-30 RX ADMIN — Medication 7 UNIT(S): at 07:52

## 2018-09-30 RX ADMIN — Medication 5 MILLIGRAM(S): at 05:36

## 2018-09-30 RX ADMIN — GABAPENTIN 400 MILLIGRAM(S): 400 CAPSULE ORAL at 13:25

## 2018-09-30 RX ADMIN — ENOXAPARIN SODIUM 50 MILLIGRAM(S): 100 INJECTION SUBCUTANEOUS at 05:35

## 2018-09-30 RX ADMIN — Medication 6: at 11:20

## 2018-09-30 RX ADMIN — CLOPIDOGREL BISULFATE 75 MILLIGRAM(S): 75 TABLET, FILM COATED ORAL at 11:20

## 2018-09-30 RX ADMIN — ATORVASTATIN CALCIUM 40 MILLIGRAM(S): 80 TABLET, FILM COATED ORAL at 21:43

## 2018-09-30 RX ADMIN — INSULIN GLARGINE 25 UNIT(S): 100 INJECTION, SOLUTION SUBCUTANEOUS at 21:43

## 2018-09-30 RX ADMIN — Medication 40 MILLIGRAM(S): at 05:36

## 2018-09-30 RX ADMIN — Medication 1: at 07:51

## 2018-09-30 RX ADMIN — PANTOPRAZOLE SODIUM 40 MILLIGRAM(S): 20 TABLET, DELAYED RELEASE ORAL at 05:41

## 2018-09-30 RX ADMIN — Medication 6 UNIT(S): at 21:44

## 2018-09-30 RX ADMIN — Medication 5 MILLIGRAM(S): at 21:43

## 2018-09-30 NOTE — PROGRESS NOTE ADULT - ASSESSMENT
#New compression fracture T11 and 12  - CT Abdomen and Pelvis w/ IV Cont (09.27.18 @ 00:32) New T11 and T12 moderate compression deformities. Unchanged moderate compression deformities of L3, L4 post kyphoplasty of L4.  - Neurosurgery eval: get MR brain, spine (TLS) under anesthesia (discussed w/ anesthesia, will be done sometime next week)  - pain control for back pain  - MRI with anesthesia on monday     #Acute DVT left femoral  - C/w lovenox  - Patient could net get VQ scan due to pain while lying back , not tachycardic, O2 >95 on RA, get VQ scan once pain under control echo    #Abd pain - probably referred pain  - c/w pain meds  - colace, senna for constipation    # troponemia:  - patient denies angina or shortness of breath  - d/w cardiac fellow, no intervention in setting of asymptomatic troponemia, possibly demand 2/2 anemia  - f/u third set CE negative     #RA  - c/w prednisone  - out pt F/u with rheumatology    #Thrombocytopenia + Microcytic anemia  - H/o hemorrhoids S/p hemorrhoidectomy  - Iron profile, ferritin  - Stool occult blood.  - hematology eval appreciated  - repeat CBC, f./u with hem #New compression fracture T11 and 12  - CT Abdomen and Pelvis w/ IV Cont (09.27.18 @ 00:32) New T11 and T12 moderate compression deformities. Unchanged moderate compression deformities of L3, L4 post kyphoplasty of L4.  - Neurosurgery eval: get MR brain, spine (TLS) under anesthesia (discussed w/ anesthesia, will be done sometime next week)  - pain control for back pain: tramadol prn tid, and morphine 2mg q3h prn (pain not adequately controlled- increased frequency from q4 to q3)  - MRI with anesthesia on monday     #Acute DVT left femoral  - C/w lovenox  - Patient could net get VQ scan due to pain while lying back , not tachycardic, O2 >95 on RA, get VQ scan once pain under control echo    #Abd pain - probably referred pain  - c/w pain meds  - colace, senna for constipation    # troponemia:  - patient denies angina or shortness of breath  - d/w cardiac fellow, no intervention in setting of asymptomatic troponemia, possibly demand 2/2 anemia  - f/u third set CE negative     #RA  - c/w prednisone  - out pt F/u with rheumatology    #Thrombocytopenia + Microcytic anemia  - H/o hemorrhoids S/p hemorrhoidectomy  - Iron profile, ferritin  - Stool occult blood.  - hematology eval:Probably related to consumption from DVT d/t acuity of drop in counts.    --Repeat CBC, if the counts are persistently low, will do a complete w/u including BM biopsy  - repeat CBC, f./u with hem #New compression fracture T11 and 12  - CT Abdomen and Pelvis w/ IV Cont (09.27.18 @ 00:32) New T11 and T12 moderate compression deformities. Unchanged moderate compression deformities of L3, L4 post kyphoplasty of L4.  - Neurosurgery eval: get MR brain, spine (CTLS) under anesthesia (discussed w/ anesthesia, will be done sometime next week)  - pain control for back pain: tramadol prn tid, and morphine 2mg q3h prn (pain not adequately controlled- increased frequency from q4 to q3)  - MRI with anesthesia on monday     #Acute DVT left femoral  - C/w lovenox  - Patient could net get VQ scan due to pain while lying back , not tachycardic, O2 >95 on RA, get VQ scan once pain under control echo    #Abd pain - probably referred pain  - c/w pain meds  - colace, senna for constipation    # troponemia:  - patient denies angina or shortness of breath  - d/w cardiac fellow, no intervention in setting of asymptomatic troponemia, possibly demand 2/2 anemia  - f/u third set CE negative     #RA  - c/w prednisone  - out pt F/u with rheumatology    #Thrombocytopenia + Microcytic anemia  - H/o hemorrhoids S/p hemorrhoidectomy  - Iron profile, ferritin  - Stool occult blood.  - hematology eval:Probably related to consumption from DVT d/t acuity of drop in counts.    --Repeat CBC, if the counts are persistently low, will do a complete w/u including BM biopsy  - repeat CBC, f./u with hem

## 2018-09-30 NOTE — PROGRESS NOTE ADULT - SUBJECTIVE AND OBJECTIVE BOX
SUBJECTIVE:    Patient is a 68y old Female who presents with a chief complaint of mid back pain of 4 days (29 Sep 2018 13:24)    Currently admitted to medicine with the primary diagnosis of Intractable pain     Today is hospital day 3d.   OVERNIGHT EVENTS  Today, patient is    PAST MEDICAL & SURGICAL HISTORY  Anemia  Osteoporosis  Rheumatoid arthritis  Dyslipidemia  HTN (hypertension)  CAD (coronary artery disease)  Diabetes  History of hemorrhoidectomy          ALLERGIES:  penicillin (Anaphylaxis; Angioedema)    MEDICATIONS:  STANDING MEDICATIONS  atorvastatin 40 milliGRAM(s) Oral at bedtime  clopidogrel Tablet 75 milliGRAM(s) Oral daily  dextrose 5%. 1000 milliLiter(s) IV Continuous <Continuous>  dextrose 50% Injectable 12.5 Gram(s) IV Push once  dextrose 50% Injectable 25 Gram(s) IV Push once  dextrose 50% Injectable 25 Gram(s) IV Push once  enoxaparin Injectable 50 milliGRAM(s) SubCutaneous every 12 hours  furosemide    Tablet 40 milliGRAM(s) Oral daily  gabapentin 400 milliGRAM(s) Oral three times a day  HYDROmorphone  Injectable 1 milliGRAM(s) IV Push once  insulin glargine Injectable (LANTUS) 25 Unit(s) SubCutaneous two times a day  insulin lispro (HumaLOG) corrective regimen sliding scale   SubCutaneous three times a day before meals  insulin lispro Injectable (HumaLOG) 7 Unit(s) SubCutaneous three times a day before meals  losartan 50 milliGRAM(s) Oral daily  pantoprazole    Tablet 40 milliGRAM(s) Oral before breakfast  predniSONE   Tablet 5 milliGRAM(s) Oral three times a day    PRN MEDICATIONS  acetaminophen   Tablet .. 650 milliGRAM(s) Oral every 6 hours PRN  dextrose 40% Gel 15 Gram(s) Oral once PRN  glucagon  Injectable 1 milliGRAM(s) IntraMuscular once PRN  morphine  - Injectable 2 milliGRAM(s) IV Push every 4 hours PRN  simethicone 80 milliGRAM(s) Chew four times a day PRN  traMADol 50 milliGRAM(s) Oral three times a day PRN    VITALS:   T(F): 97  HR: 63  BP: 149/74  RR: 16  SpO2: --          LABS:                        9.4    9.37  )-----------( 70       ( 29 Sep 2018 09:20 )             31.7     09-29    135  |  95<L>  |  31<H>  ----------------------------<  382<H>  4.6   |  19  |  1.0    Ca    8.8      29 Sep 2018 09:20  Phos  4.5     09-29  Mg     1.8     09-29                CARDIAC MARKERS ( 29 Sep 2018 02:13 )  x     / 0.04 ng/mL / x     / x     / x      CARDIAC MARKERS ( 28 Sep 2018 11:33 )  x     / 0.07 ng/mL / x     / x     / x          RADIOLOGY:    PHYSICAL EXAM:  GEN:   LUNGS:   HEART:   ABD:   EXT:   NEURO: SUBJECTIVE:    Patient is a 68y old Female who presents with a chief complaint of mid back pain of 4 days (29 Sep 2018 13:24)    Currently admitted to medicine with the primary diagnosis of Intractable pain     Today is hospital day 3d.   OVERNIGHT EVENTS  Today, patient continue to complain of back pain that radiates to the abdomen. Denies any CP, SOB. Had bowel mov today after taking laxative.     PAST MEDICAL & SURGICAL HISTORY  Anemia  Osteoporosis  Rheumatoid arthritis  Dyslipidemia  HTN (hypertension)  CAD (coronary artery disease)  Diabetes  History of hemorrhoidectomy          ALLERGIES:  penicillin (Anaphylaxis; Angioedema)    MEDICATIONS:  STANDING MEDICATIONS  atorvastatin 40 milliGRAM(s) Oral at bedtime  clopidogrel Tablet 75 milliGRAM(s) Oral daily  dextrose 5%. 1000 milliLiter(s) IV Continuous <Continuous>  dextrose 50% Injectable 12.5 Gram(s) IV Push once  dextrose 50% Injectable 25 Gram(s) IV Push once  dextrose 50% Injectable 25 Gram(s) IV Push once  enoxaparin Injectable 50 milliGRAM(s) SubCutaneous every 12 hours  furosemide    Tablet 40 milliGRAM(s) Oral daily  gabapentin 400 milliGRAM(s) Oral three times a day  HYDROmorphone  Injectable 1 milliGRAM(s) IV Push once  insulin glargine Injectable (LANTUS) 25 Unit(s) SubCutaneous two times a day  insulin lispro (HumaLOG) corrective regimen sliding scale   SubCutaneous three times a day before meals  insulin lispro Injectable (HumaLOG) 7 Unit(s) SubCutaneous three times a day before meals  losartan 50 milliGRAM(s) Oral daily  pantoprazole    Tablet 40 milliGRAM(s) Oral before breakfast  predniSONE   Tablet 5 milliGRAM(s) Oral three times a day    PRN MEDICATIONS  acetaminophen   Tablet .. 650 milliGRAM(s) Oral every 6 hours PRN  dextrose 40% Gel 15 Gram(s) Oral once PRN  glucagon  Injectable 1 milliGRAM(s) IntraMuscular once PRN  morphine  - Injectable 2 milliGRAM(s) IV Push every 4 hours PRN  simethicone 80 milliGRAM(s) Chew four times a day PRN  traMADol 50 milliGRAM(s) Oral three times a day PRN    VITALS:   T(F): 97  HR: 63  BP: 149/74  RR: 16  SpO2: --          LABS:                        9.4    9.37  )-----------( 70       ( 29 Sep 2018 09:20 )             31.7     09-29    135  |  95<L>  |  31<H>  ----------------------------<  382<H>  4.6   |  19  |  1.0    Ca    8.8      29 Sep 2018 09:20  Phos  4.5     09-29  Mg     1.8     09-29                CARDIAC MARKERS ( 29 Sep 2018 02:13 )  x     / 0.04 ng/mL / x     / x     / x      CARDIAC MARKERS ( 28 Sep 2018 11:33 )  x     / 0.07 ng/mL / x     / x     / x          RADIOLOGY:    PHYSICAL EXAM:  GEN: NAD  LUNGS: CTAB  HEART: rrr s1s2 present  ABD: tender to palpation diffusely, soft, nondistended  EXT: no edema   BACK: tender to palpation thoracic/lumbar region   NEURO: aao x3, grossly intact SUBJECTIVE:    Patient is a 68y old Female who presents with a chief complaint of mid back pain of 4 days (29 Sep 2018 13:24)  still has back pain and difficulty ambulating.     Currently admitted to medicine with the primary diagnosis of Intractable pain     Today is hospital day 3d.   OVERNIGHT EVENTS  Today, patient continue to complain of back pain that radiates to the abdomen. Denies any CP, SOB. Had bowel mov today after taking laxative.     PAST MEDICAL & SURGICAL HISTORY  Anemia  Osteoporosis  Rheumatoid arthritis  Dyslipidemia  HTN (hypertension)  CAD (coronary artery disease)  Diabetes  History of hemorrhoidectomy          ALLERGIES:  penicillin (Anaphylaxis; Angioedema)    MEDICATIONS:  STANDING MEDICATIONS  atorvastatin 40 milliGRAM(s) Oral at bedtime  clopidogrel Tablet 75 milliGRAM(s) Oral daily  dextrose 5%. 1000 milliLiter(s) IV Continuous <Continuous>  dextrose 50% Injectable 12.5 Gram(s) IV Push once  dextrose 50% Injectable 25 Gram(s) IV Push once  dextrose 50% Injectable 25 Gram(s) IV Push once  enoxaparin Injectable 50 milliGRAM(s) SubCutaneous every 12 hours  furosemide    Tablet 40 milliGRAM(s) Oral daily  gabapentin 400 milliGRAM(s) Oral three times a day  HYDROmorphone  Injectable 1 milliGRAM(s) IV Push once  insulin glargine Injectable (LANTUS) 25 Unit(s) SubCutaneous two times a day  insulin lispro (HumaLOG) corrective regimen sliding scale   SubCutaneous three times a day before meals  insulin lispro Injectable (HumaLOG) 7 Unit(s) SubCutaneous three times a day before meals  losartan 50 milliGRAM(s) Oral daily  pantoprazole    Tablet 40 milliGRAM(s) Oral before breakfast  predniSONE   Tablet 5 milliGRAM(s) Oral three times a day    PRN MEDICATIONS  acetaminophen   Tablet .. 650 milliGRAM(s) Oral every 6 hours PRN  dextrose 40% Gel 15 Gram(s) Oral once PRN  glucagon  Injectable 1 milliGRAM(s) IntraMuscular once PRN  morphine  - Injectable 2 milliGRAM(s) IV Push every 4 hours PRN  simethicone 80 milliGRAM(s) Chew four times a day PRN  traMADol 50 milliGRAM(s) Oral three times a day PRN    VITALS:   T(F): 97  HR: 63  BP: 149/74  RR: 16  SpO2: --          LABS:                        9.4    9.37  )-----------( 70       ( 29 Sep 2018 09:20 )             31.7     09-29    135  |  95<L>  |  31<H>  ----------------------------<  382<H>  4.6   |  19  |  1.0    Ca    8.8      29 Sep 2018 09:20  Phos  4.5     09-29  Mg     1.8     09-29                CARDIAC MARKERS ( 29 Sep 2018 02:13 )  x     / 0.04 ng/mL / x     / x     / x      CARDIAC MARKERS ( 28 Sep 2018 11:33 )  x     / 0.07 ng/mL / x     / x     / x          RADIOLOGY:    PHYSICAL EXAM:  GEN: NAD  LUNGS: CTAB  HEART: rrr s1s2 present  ABD: tender to palpation diffusely, soft, nondistended  EXT: no edema   BACK: tender to palpation thoracic/lumbar region   NEURO: aao x3, grossly intact

## 2018-10-01 LAB
ALDOLASE SERPL-CCNC: 5 U/L — SIGNIFICANT CHANGE UP (ref 3.3–10.3)
ANION GAP SERPL CALC-SCNC: 16 MMOL/L — HIGH (ref 7–14)
BUN SERPL-MCNC: 32 MG/DL — HIGH (ref 10–20)
CALCIUM SERPL-MCNC: 8.7 MG/DL — SIGNIFICANT CHANGE UP (ref 8.5–10.1)
CHLORIDE SERPL-SCNC: 96 MMOL/L — LOW (ref 98–110)
CO2 SERPL-SCNC: 22 MMOL/L — SIGNIFICANT CHANGE UP (ref 17–32)
CREAT SERPL-MCNC: 1 MG/DL — SIGNIFICANT CHANGE UP (ref 0.7–1.5)
GLUCOSE BLDC GLUCOMTR-MCNC: 163 MG/DL — HIGH (ref 70–99)
GLUCOSE BLDC GLUCOMTR-MCNC: 180 MG/DL — HIGH (ref 70–99)
GLUCOSE BLDC GLUCOMTR-MCNC: 317 MG/DL — HIGH (ref 70–99)
GLUCOSE BLDC GLUCOMTR-MCNC: 331 MG/DL — HIGH (ref 70–99)
GLUCOSE SERPL-MCNC: 167 MG/DL — HIGH (ref 70–99)
HCT VFR BLD CALC: 29.7 % — LOW (ref 37–47)
HGB BLD-MCNC: 8.8 G/DL — LOW (ref 12–16)
MAGNESIUM SERPL-MCNC: 1.7 MG/DL — LOW (ref 1.8–2.4)
MCHC RBC-ENTMCNC: 18.3 PG — LOW (ref 27–31)
MCHC RBC-ENTMCNC: 29.6 G/DL — LOW (ref 32–37)
MCV RBC AUTO: 61.7 FL — LOW (ref 81–99)
NRBC # BLD: 0 /100 WBCS — SIGNIFICANT CHANGE UP (ref 0–0)
PLATELET # BLD AUTO: 82 K/UL — LOW (ref 130–400)
POTASSIUM SERPL-MCNC: 4.5 MMOL/L — SIGNIFICANT CHANGE UP (ref 3.5–5)
POTASSIUM SERPL-SCNC: 4.5 MMOL/L — SIGNIFICANT CHANGE UP (ref 3.5–5)
RBC # BLD: 4.81 M/UL — SIGNIFICANT CHANGE UP (ref 4.2–5.4)
RBC # FLD: 16.8 % — HIGH (ref 11.5–14.5)
SODIUM SERPL-SCNC: 134 MMOL/L — LOW (ref 135–146)
WBC # BLD: 10.95 K/UL — HIGH (ref 4.8–10.8)
WBC # FLD AUTO: 10.95 K/UL — HIGH (ref 4.8–10.8)

## 2018-10-01 RX ORDER — POLYETHYLENE GLYCOL 3350 17 G/17G
17 POWDER, FOR SOLUTION ORAL EVERY 12 HOURS
Qty: 0 | Refills: 0 | Status: DISCONTINUED | OUTPATIENT
Start: 2018-10-01 | End: 2018-10-04

## 2018-10-01 RX ORDER — INFLUENZA VIRUS VACCINE 15; 15; 15; 15 UG/.5ML; UG/.5ML; UG/.5ML; UG/.5ML
0.5 SUSPENSION INTRAMUSCULAR ONCE
Qty: 0 | Refills: 0 | Status: DISCONTINUED | OUTPATIENT
Start: 2018-10-01 | End: 2018-10-06

## 2018-10-01 RX ORDER — MORPHINE SULFATE 50 MG/1
4 CAPSULE, EXTENDED RELEASE ORAL
Qty: 0 | Refills: 0 | Status: DISCONTINUED | OUTPATIENT
Start: 2018-10-01 | End: 2018-10-04

## 2018-10-01 RX ORDER — CHOLECALCIFEROL (VITAMIN D3) 125 MCG
2000 CAPSULE ORAL DAILY
Qty: 0 | Refills: 0 | Status: DISCONTINUED | OUTPATIENT
Start: 2018-10-01 | End: 2018-10-04

## 2018-10-01 RX ORDER — ASPIRIN/CALCIUM CARB/MAGNESIUM 324 MG
81 TABLET ORAL DAILY
Qty: 0 | Refills: 0 | Status: DISCONTINUED | OUTPATIENT
Start: 2018-10-01 | End: 2018-10-04

## 2018-10-01 RX ADMIN — Medication 5 MILLIGRAM(S): at 21:09

## 2018-10-01 RX ADMIN — GABAPENTIN 400 MILLIGRAM(S): 400 CAPSULE ORAL at 14:30

## 2018-10-01 RX ADMIN — LOSARTAN POTASSIUM 50 MILLIGRAM(S): 100 TABLET, FILM COATED ORAL at 05:04

## 2018-10-01 RX ADMIN — GABAPENTIN 400 MILLIGRAM(S): 400 CAPSULE ORAL at 21:11

## 2018-10-01 RX ADMIN — Medication 2000 UNIT(S): at 18:21

## 2018-10-01 RX ADMIN — MORPHINE SULFATE 2 MILLIGRAM(S): 50 CAPSULE, EXTENDED RELEASE ORAL at 05:22

## 2018-10-01 RX ADMIN — ATORVASTATIN CALCIUM 40 MILLIGRAM(S): 80 TABLET, FILM COATED ORAL at 21:09

## 2018-10-01 RX ADMIN — Medication 4: at 18:23

## 2018-10-01 RX ADMIN — Medication 7 UNIT(S): at 18:23

## 2018-10-01 RX ADMIN — PANTOPRAZOLE SODIUM 40 MILLIGRAM(S): 20 TABLET, DELAYED RELEASE ORAL at 05:04

## 2018-10-01 RX ADMIN — MORPHINE SULFATE 2 MILLIGRAM(S): 50 CAPSULE, EXTENDED RELEASE ORAL at 06:33

## 2018-10-01 RX ADMIN — Medication 5 MILLIGRAM(S): at 14:27

## 2018-10-01 RX ADMIN — MORPHINE SULFATE 2 MILLIGRAM(S): 50 CAPSULE, EXTENDED RELEASE ORAL at 14:35

## 2018-10-01 RX ADMIN — INSULIN GLARGINE 25 UNIT(S): 100 INJECTION, SOLUTION SUBCUTANEOUS at 21:36

## 2018-10-01 RX ADMIN — Medication 81 MILLIGRAM(S): at 18:21

## 2018-10-01 RX ADMIN — GABAPENTIN 400 MILLIGRAM(S): 400 CAPSULE ORAL at 05:04

## 2018-10-01 RX ADMIN — Medication 40 MILLIGRAM(S): at 05:03

## 2018-10-01 RX ADMIN — Medication 5 MILLIGRAM(S): at 05:04

## 2018-10-01 RX ADMIN — ENOXAPARIN SODIUM 50 MILLIGRAM(S): 100 INJECTION SUBCUTANEOUS at 05:02

## 2018-10-01 RX ADMIN — POLYETHYLENE GLYCOL 3350 17 GRAM(S): 17 POWDER, FOR SOLUTION ORAL at 18:22

## 2018-10-01 RX ADMIN — ENOXAPARIN SODIUM 50 MILLIGRAM(S): 100 INJECTION SUBCUTANEOUS at 18:21

## 2018-10-01 RX ADMIN — CLOPIDOGREL BISULFATE 75 MILLIGRAM(S): 75 TABLET, FILM COATED ORAL at 11:45

## 2018-10-01 NOTE — PROGRESS NOTE ADULT - ASSESSMENT
67 y/o with multiple medical co-morbidities including anemia, thalassemia minor, osteoporosis, DM, HTN, gastric ulcer, CAD, RA on prednisone p/w back pain and abdominal pain. Found to have new compression deformities as well as new left common femoral vein DVT and new thrombocytopenia. Hematology consulted for thrombocytopenia.    1. Thrombocytopenia-possibly secondary to consumption from DVT  --Platelet count 57 on admission, now 82.  --If the counts are persistently low, will consider BM biopsy.  (Can be done as outpatient.)    2. DVT: Probably provoked from decreased mobility  --Will continue with lovenox 1mg/kg BID. Safe to anticoagulate at platelet count greater than 50.    3. Microcytic anemia  --Secondary to thalassemia minor  --Continue to monitor hemoglobin. 67 y/o with multiple medical co-morbidities including anemia, thalassemia minor, osteoporosis, DM, HTN, gastric ulcer, CAD, RA on prednisone p/w back pain and abdominal pain. Found to have new compression deformities as well as new left common femoral vein DVT and new thrombocytopenia. Hematology consulted for thrombocytopenia.    1. Thrombocytopenia-possibly secondary to consumption from DVT  --Platelet count 57 on admission, now 82.  --If the counts are persistently low, will consider BM biopsy.  (Can be done as outpatient.  Patient states that her top priority for this hospitalization is addressing her back pain, although she may be interested in pursuing the bone marrow biopsy at a later date.)    2. DVT: Probably provoked from decreased mobility  --Will continue with lovenox 1mg/kg BID. Safe to anticoagulate at platelet count greater than 50.    3. Microcytic anemia  --Secondary to thalassemia minor  --Continue to monitor hemoglobin.

## 2018-10-01 NOTE — PROGRESS NOTE ADULT - ATTENDING COMMENTS
Patient seen and examined independently earlier today. Case discussed with housestaff, nursing, social work, patient, daughter-in-law, NS. I agree with most of the resident's note, physical exam, and plan except as below. Patient still with midback pain and abd discomfort. Reports decreased LLE swelling. Awaiting MRIs under anesthesia as pt unable to lie flat. Pain control. Laxatives. Will switch pt to ASA from Plavix in preparation for surgery. Cont therapeutic A/c. F/u Plts.

## 2018-10-01 NOTE — PROGRESS NOTE ADULT - ASSESSMENT
#New compression fracture T11 and 12  - CT Abdomen and Pelvis w/ IV Cont (09.27.18 @ 00:32) New T11 and T12 moderate compression deformities. Unchanged moderate compression deformities of L3, L4 post kyphoplasty of L4.  - Neurosurgery eval: get MR brain, spine (CTLS) under anesthesia (discussed w/ anesthesia, will be done sometime next week)  - pain control for back pain: tramadol prn tid, and morphine 2mg q3h prn (pain not adequately controlled- increased frequency from q4 to q3)  - MRI was cancelled ? re ordered it     #Acute DVT left femoral  - C/w lovenox SQ BD  - Patient could net get VQ scan due to pain while lying back , not tachycardic, O2 >95 on RA, get VQ scan once pain under control echo    #Abd pain - probably referred pain  - c/w pain meds  - colace, senna for constipation    # troponemia:  - patient denies angina or shortness of breath  - d/w cardiac fellow, no intervention in setting of asymptomatic troponemia, possibly demand 2/2 anemia  - f/u third set CE negative     #RA  - c/w prednisone  - out pt F/u with rheumatology    #Thrombocytopenia + Microcytic anemia  - H/o hemorrhoids S/p hemorrhoidectomy  --Microcytic anemia Secondary to thalassemia minor  --Continue to monitor hemoglobin.   hematology eval:Probably related to consumption from DVT d/t acuity of drop in counts.    --Repeat CBC, if the counts are persistently low, will do a complete w/u including BM biopsy  - repeat CBC, f./u with hem #New compression fracture T11 and 12  - CT Abdomen and Pelvis w/ IV Cont (09.27.18 @ 00:32) New T11 and T12 moderate compression deformities. Unchanged moderate compression deformities of L3, L4 post kyphoplasty of L4.  - Neurosurgery eval: get MR (LS) under anesthesia (discussed w/ anesthesia, will be done this week)  - pain control for back pain: tramadol prn tid, and morphine 2mg q3h prn (pain not adequately controlled- increased frequency from q4 to q3)  - MRI was cancelled ? re ordered it     #Acute DVT left femoral  - C/w lovenox SQ BD  - Patient could net get VQ scan due to pain while lying back , not tachycardic, O2 >95 on RA, get VQ scan once pain under control echo    #Abd pain - probably referred pain  - c/w pain meds  - colace, senna for constipation    # troponemia:  - patient denies angina or shortness of breath  - d/w cardiac fellow, no intervention in setting of asymptomatic troponemia, possibly demand 2/2 anemia  - f/u third set CE negative     #RA  - c/w prednisone  - out pt F/u with rheumatology    #Thrombocytopenia + Microcytic anemia  - H/o hemorrhoids S/p hemorrhoidectomy  --Microcytic anemia Secondary to thalassemia minor  --Continue to monitor hemoglobin.   hematology eval:Probably related to consumption from DVT d/t acuity of drop in counts.    --Repeat CBC, if the counts are persistently low, will do a complete w/u including BM biopsy  -will continue to monitor the counts     DVT PPX :Pt already on Lovenox for DVT in left femoral

## 2018-10-01 NOTE — CONSULT NOTE ADULT - SUBJECTIVE AND OBJECTIVE BOX
HPI:  69 yo female patient with PMHx HTN, DM II, CAD/CABG x2 (last in 2013), DL, RA on chronic prednisone 15mg daily for 30 years complicated by osteoporosis and vertebral fractures s/p kyphoplasty 3 months ago, anemia, presenting because of worsening mid back pain and abdominal pain over the last 4 days.  Patient reports mid back severe pain, started spontaneously 4 days ago, no back trauma or injury. Pain is sharp, poorly relieved by tylenol. Unable to lay flat, she says she's been sleeping sitting on a sofa. Later on, she started experiencing abdominal pain, colicky, mainly at the RUQ, moderate in intensity, non radiating, not related to food intake, no nausea or vomiting or diarrhea associated, no fever or chills. No other complaints.  Note that patient has had decreased physical activity it's been around 9 months since she had the vertebral fractures. She spends most of her time in the sofa. (27 Sep 2018 09:59)      PAST MEDICAL & SURGICAL HISTORY:  Anemia  Osteoporosis  Rheumatoid arthritis  Dyslipidemia  HTN (hypertension)  CAD (coronary artery disease)  Diabetes  History of hemorrhoidectomy      Hospital Course:  Dtr-in-law translates at the patient's request. She speaks [place].  Patient with osteoporosis and on Prednisone for RA.  She took Vit D 50,000 units for 12 weeks.  TODAY'S SUBJECTIVE & REVIEW OF SYMPTOMS:     Constitutional WNL   Cardio WNL   Resp WNL   GI WNL  Heme WNL  Endo WNL  Skin WNL  MSK WNL  Neuro WNL  Cognitive WNL  Psych WNL      MEDICATIONS  (STANDING):  aspirin  chewable 81 milliGRAM(s) Oral daily  atorvastatin 40 milliGRAM(s) Oral at bedtime  dextrose 5%. 1000 milliLiter(s) (50 mL/Hr) IV Continuous <Continuous>  dextrose 50% Injectable 12.5 Gram(s) IV Push once  dextrose 50% Injectable 25 Gram(s) IV Push once  dextrose 50% Injectable 25 Gram(s) IV Push once  enoxaparin Injectable 50 milliGRAM(s) SubCutaneous every 12 hours  furosemide    Tablet 40 milliGRAM(s) Oral daily  gabapentin 400 milliGRAM(s) Oral three times a day  HYDROmorphone  Injectable 1 milliGRAM(s) IV Push once  influenza   Vaccine 0.5 milliLiter(s) IntraMuscular once  insulin glargine Injectable (LANTUS) 25 Unit(s) SubCutaneous two times a day  insulin lispro (HumaLOG) corrective regimen sliding scale   SubCutaneous three times a day before meals  insulin lispro Injectable (HumaLOG) 7 Unit(s) SubCutaneous three times a day before meals  losartan 50 milliGRAM(s) Oral daily  pantoprazole    Tablet 40 milliGRAM(s) Oral before breakfast  predniSONE   Tablet 5 milliGRAM(s) Oral three times a day    MEDICATIONS  (PRN):  acetaminophen   Tablet .. 650 milliGRAM(s) Oral every 6 hours PRN Mild Pain (1 - 3)  dextrose 40% Gel 15 Gram(s) Oral once PRN Blood Glucose LESS THAN 70 milliGRAM(s)/deciliter  glucagon  Injectable 1 milliGRAM(s) IntraMuscular once PRN Glucose LESS THAN 70 milligrams/deciliter  morphine  - Injectable 2 milliGRAM(s) IV Push every 3 hours PRN Severe Pain (7 - 10)  simethicone 80 milliGRAM(s) Chew four times a day PRN Gas  traMADol 50 milliGRAM(s) Oral three times a day PRN Moderate Pain (4 - 6)      FAMILY HISTORY:  Family history of thalassemia (Sibling)  Family history of coronary artery disease (Father)      Allergies    penicillin (Anaphylaxis; Angioedema)    Intolerances        SOCIAL HISTORY:    [  ] Etoh  [  ] Smoking  [  ] Substance abuse     Home Environment:  [  ] Home Alone  [x  ] Lives with Family-son, dtr-in-law  [  ] Home Health Aid    Dwelling:  [  ] Apartment  [  ] Private House  [  ] Adult Home  [  ] Skilled Nursing Facility      [  ] Short Term  [  ] Long Term  [  ] Stairs       Elevator [  ]    FUNCTIONAL STATUS PTA: (Check all that apply)  Ambulation: [x   ]Independent    [  ] Dependent     [  ] Non-Ambulatory  Assistive Device: [  ] SA Cane  [  ]  Q Cane  [x  ] Walker  [  ]  Wheelchair  ADL : [  ] Independent  [  ]  Dependent       Vital Signs Last 24 Hrs  T(C): 35.6 (01 Oct 2018 16:17), Max: 36.4 (01 Oct 2018 00:04)  T(F): 96.1 (01 Oct 2018 16:17), Max: 97.6 (01 Oct 2018 00:04)  HR: 81 (01 Oct 2018 16:17) (61 - 81)  BP: 108/57 (01 Oct 2018 16:17) (108/57 - 144/70)  BP(mean): --  RR: 18 (01 Oct 2018 16:17) (18 - 18)  SpO2: --      PHYSICAL EXAM: Alert & Oriented X3  GENERAL: NAD, well-groomed, well-developed  HEAD:  Atraumatic, Normocephalic  EYES: EOMI, PERRLA, conjunctiva and sclera clear  NECK: Supple, No JVD, Normal thyroid  CHEST/LUNG: Clear to percussion bilaterally; No rales, rhonchi, wheezing, or rubs  HEART: Regular rate and rhythm; No murmurs, rubs, or gallops  ABDOMEN: Soft, Nontender, Nondistended; Bowel sounds present  EXTREMITIES:  2+ Peripheral Pulses, No clubbing, cyanosis, or edema    NERVOUS SYSTEM:  Cranial Nerves 2-12 intact [  ] Abnormal  [  ]  ROM: WFL all extremities [  ]  Abnormal [  ]  Motor Strength: WFL all extremities  [  ]  Abnormal [ x ] left hip flex 4/5 with pain, 5/5 otherwise  Sensation: intact to light touch [  ] Abnormal [  ]  Reflexes: Symmetric [  ]  Abnormal [  ]    FUNCTIONAL STATUS:  Bed Mobility: Independent [  ]  Supervision [  ]  Needs Assistance [  ]  N/A [  ]  Transfers: Independent [  ]  Supervision [  ]  Needs Assistance [  ]  N/A [  ]   Ambulation: Independent [  ]  Supervision [  ]  Needs Assistance [  ]  N/A [  ]  ADL: Independent [  ] Requires Assistance [  ] N/A [  ]      LABS:                        8.8    10.95 )-----------( 82       ( 01 Oct 2018 05:53 )             29.7     10-01    134<L>  |  96<L>  |  32<H>  ----------------------------<  167<H>  4.5   |  22  |  1.0    Ca    8.7      01 Oct 2018 05:53  Mg     1.7     10-01            RADIOLOGY & ADDITIONAL STUDIES:    Assesment:

## 2018-10-01 NOTE — PROGRESS NOTE ADULT - SUBJECTIVE AND OBJECTIVE BOX
Patient is a 68y old  Female who presents with a chief complaint of mid back pain of 4 days (30 Sep 2018 09:02)      Subjective:      Vital Signs Last 24 Hrs  T(C): 35.8 (01 Oct 2018 07:30), Max: 36.4 (01 Oct 2018 00:04)  T(F): 96.4 (01 Oct 2018 07:30), Max: 97.6 (01 Oct 2018 00:04)  HR: 67 (01 Oct 2018 07:30) (61 - 73)  BP: 144/70 (01 Oct 2018 07:30) (117/66 - 149/74)  BP(mean): --  RR: 18 (01 Oct 2018 07:30) (16 - 18)  SpO2: --    PHYSICAL EXAM  General: adult in NAD  HEENT: clear oropharynx, anicteric sclera, pink conjunctiva  Neck: supple  CV: normal S1/S2 with no murmur rubs or gallops  Lungs: positive air movement b/l ant lungs,clear to auscultation, no wheezes, no rales  Abdomen: soft non-tender non-distended, no hepatosplenomegaly  Ext: no clubbing cyanosis or edema  Skin: no rashes and no petechiae  Neuro: alert and oriented X 4, no focal deficits    MEDICATIONS  (STANDING):  atorvastatin 40 milliGRAM(s) Oral at bedtime  clopidogrel Tablet 75 milliGRAM(s) Oral daily  dextrose 5%. 1000 milliLiter(s) (50 mL/Hr) IV Continuous <Continuous>  dextrose 50% Injectable 12.5 Gram(s) IV Push once  dextrose 50% Injectable 25 Gram(s) IV Push once  dextrose 50% Injectable 25 Gram(s) IV Push once  enoxaparin Injectable 50 milliGRAM(s) SubCutaneous every 12 hours  furosemide    Tablet 40 milliGRAM(s) Oral daily  gabapentin 400 milliGRAM(s) Oral three times a day  HYDROmorphone  Injectable 1 milliGRAM(s) IV Push once  insulin glargine Injectable (LANTUS) 25 Unit(s) SubCutaneous two times a day  insulin lispro (HumaLOG) corrective regimen sliding scale   SubCutaneous three times a day before meals  insulin lispro Injectable (HumaLOG) 7 Unit(s) SubCutaneous three times a day before meals  losartan 50 milliGRAM(s) Oral daily  pantoprazole    Tablet 40 milliGRAM(s) Oral before breakfast  predniSONE   Tablet 5 milliGRAM(s) Oral three times a day    MEDICATIONS  (PRN):  acetaminophen   Tablet .. 650 milliGRAM(s) Oral every 6 hours PRN Mild Pain (1 - 3)  dextrose 40% Gel 15 Gram(s) Oral once PRN Blood Glucose LESS THAN 70 milliGRAM(s)/deciliter  glucagon  Injectable 1 milliGRAM(s) IntraMuscular once PRN Glucose LESS THAN 70 milligrams/deciliter  morphine  - Injectable 2 milliGRAM(s) IV Push every 3 hours PRN Severe Pain (7 - 10)  simethicone 80 milliGRAM(s) Chew four times a day PRN Gas  traMADol 50 milliGRAM(s) Oral three times a day PRN Moderate Pain (4 - 6)      LABS:                          8.8    10.95 )-----------( 82       ( 01 Oct 2018 05:53 )             29.7         Mean Cell Volume : 61.7 fL  Mean Cell Hemoglobin : 18.3 pg  Mean Cell Hemoglobin Concentration : 29.6 g/dL  Auto Neutrophil # : x  Auto Lymphocyte # : x  Auto Monocyte # : x  Auto Eosinophil # : x  Auto Basophil # : x  Auto Neutrophil % : x  Auto Lymphocyte % : x  Auto Monocyte % : x  Auto Eosinophil % : x  Auto Basophil % : x      Serial CBC's  10-01 @ 05:53  Hct-29.7 / Hgb-8.8 / Plat-82 / RBC-4.81 / WBC-10.95  Serial CBC's  09-29 @ 09:20  Hct-31.7 / Hgb-9.4 / Plat-70 / RBC-5.06 / WBC-9.37  Serial CBC's  09-28 @ 07:32  Hct-33.5 / Hgb-9.9 / Plat-72 / RBC-5.39 / WBC-11.66  Serial CBC's  09-27 @ 09:57  Hct-33.2 / Hgb-9.9 / Plat-83 / RBC-5.29 / WBC-15.30      09-29    135  |  95<L>  |  31<H>  ----------------------------<  382<H>  4.6   |  19  |  1.0    Ca    8.8      29 Sep 2018 09:20  Phos  4.5     09-29  Mg     1.8     09-29 Patient is a 68y old  Female who presents with a chief complaint of mid back pain of 4 days (30 Sep 2018 09:02)    Subjective: Patient seen and examined.  Continues to complain of back pain and states that the pain control is the top priority of her hospitalization.    Vital Signs Last 24 Hrs  T(C): 35.8 (01 Oct 2018 07:30), Max: 36.4 (01 Oct 2018 00:04)  T(F): 96.4 (01 Oct 2018 07:30), Max: 97.6 (01 Oct 2018 00:04)  HR: 67 (01 Oct 2018 07:30) (61 - 73)  BP: 144/70 (01 Oct 2018 07:30) (117/66 - 149/74)  BP(mean): --  RR: 18 (01 Oct 2018 07:30) (16 - 18)  SpO2: --    PHYSICAL EXAM  General: adult in NAD  HEENT: NC/AT  Neck: supple  CV: +S1, +S2  Lungs: positive air movement b/l ant lungs, no wheezing  Abdomen: soft  MSK: +low back pain, increased with palpation; patient states travels to anterior ribs  Skin: no rashes and no petechiae  Neuro: alert and oriented X 4, no focal deficits    MEDICATIONS  (STANDING):  atorvastatin 40 milliGRAM(s) Oral at bedtime  clopidogrel Tablet 75 milliGRAM(s) Oral daily  dextrose 5%. 1000 milliLiter(s) (50 mL/Hr) IV Continuous <Continuous>  dextrose 50% Injectable 12.5 Gram(s) IV Push once  dextrose 50% Injectable 25 Gram(s) IV Push once  dextrose 50% Injectable 25 Gram(s) IV Push once  enoxaparin Injectable 50 milliGRAM(s) SubCutaneous every 12 hours  furosemide    Tablet 40 milliGRAM(s) Oral daily  gabapentin 400 milliGRAM(s) Oral three times a day  HYDROmorphone  Injectable 1 milliGRAM(s) IV Push once  insulin glargine Injectable (LANTUS) 25 Unit(s) SubCutaneous two times a day  insulin lispro (HumaLOG) corrective regimen sliding scale   SubCutaneous three times a day before meals  insulin lispro Injectable (HumaLOG) 7 Unit(s) SubCutaneous three times a day before meals  losartan 50 milliGRAM(s) Oral daily  pantoprazole    Tablet 40 milliGRAM(s) Oral before breakfast  predniSONE   Tablet 5 milliGRAM(s) Oral three times a day    MEDICATIONS  (PRN):  acetaminophen   Tablet .. 650 milliGRAM(s) Oral every 6 hours PRN Mild Pain (1 - 3)  dextrose 40% Gel 15 Gram(s) Oral once PRN Blood Glucose LESS THAN 70 milliGRAM(s)/deciliter  glucagon  Injectable 1 milliGRAM(s) IntraMuscular once PRN Glucose LESS THAN 70 milligrams/deciliter  morphine  - Injectable 2 milliGRAM(s) IV Push every 3 hours PRN Severe Pain (7 - 10)  simethicone 80 milliGRAM(s) Chew four times a day PRN Gas  traMADol 50 milliGRAM(s) Oral three times a day PRN Moderate Pain (4 - 6)      LABS:                          8.8    10.95 )-----------( 82       ( 01 Oct 2018 05:53 )             29.7         Mean Cell Volume : 61.7 fL  Mean Cell Hemoglobin : 18.3 pg  Mean Cell Hemoglobin Concentration : 29.6 g/dL  Auto Neutrophil # : x  Auto Lymphocyte # : x  Auto Monocyte # : x  Auto Eosinophil # : x  Auto Basophil # : x  Auto Neutrophil % : x  Auto Lymphocyte % : x  Auto Monocyte % : x  Auto Eosinophil % : x  Auto Basophil % : x      Serial CBC's  10-01 @ 05:53  Hct-29.7 / Hgb-8.8 / Plat-82 / RBC-4.81 / WBC-10.95  Serial CBC's  09-29 @ 09:20  Hct-31.7 / Hgb-9.4 / Plat-70 / RBC-5.06 / WBC-9.37  Serial CBC's  09-28 @ 07:32  Hct-33.5 / Hgb-9.9 / Plat-72 / RBC-5.39 / WBC-11.66  Serial CBC's  09-27 @ 09:57  Hct-33.2 / Hgb-9.9 / Plat-83 / RBC-5.29 / WBC-15.30      09-29    135  |  95<L>  |  31<H>  ----------------------------<  382<H>  4.6   |  19  |  1.0    Ca    8.8      29 Sep 2018 09:20  Phos  4.5     09-29  Mg     1.8     09-29

## 2018-10-01 NOTE — PROGRESS NOTE ADULT - SUBJECTIVE AND OBJECTIVE BOX
SUBJECTIVE:    Patient is a 68y old Female who presents with a chief complaint of mid back pain of 4 days (01 Oct 2018 07:33)    Currently admitted to medicine with the primary diagnosis of Intractable pain     Today is hospital day 4d. Pt still complaints of back pain.    PAST MEDICAL & SURGICAL HISTORY  Anemia  Osteoporosis  Rheumatoid arthritis  Dyslipidemia  HTN (hypertension)  CAD (coronary artery disease)  Diabetes  History of hemorrhoidectomy    SOCIAL HISTORY:  Negative for smoking/alcohol/drug use.     ALLERGIES:  penicillin (Anaphylaxis; Angioedema)    MEDICATIONS:  STANDING MEDICATIONS  atorvastatin 40 milliGRAM(s) Oral at bedtime  clopidogrel Tablet 75 milliGRAM(s) Oral daily  dextrose 5%. 1000 milliLiter(s) IV Continuous <Continuous>  dextrose 50% Injectable 12.5 Gram(s) IV Push once  dextrose 50% Injectable 25 Gram(s) IV Push once  dextrose 50% Injectable 25 Gram(s) IV Push once  enoxaparin Injectable 50 milliGRAM(s) SubCutaneous every 12 hours  furosemide    Tablet 40 milliGRAM(s) Oral daily  gabapentin 400 milliGRAM(s) Oral three times a day  HYDROmorphone  Injectable 1 milliGRAM(s) IV Push once  influenza   Vaccine 0.5 milliLiter(s) IntraMuscular once  insulin glargine Injectable (LANTUS) 25 Unit(s) SubCutaneous two times a day  insulin lispro (HumaLOG) corrective regimen sliding scale   SubCutaneous three times a day before meals  insulin lispro Injectable (HumaLOG) 7 Unit(s) SubCutaneous three times a day before meals  losartan 50 milliGRAM(s) Oral daily  pantoprazole    Tablet 40 milliGRAM(s) Oral before breakfast  predniSONE   Tablet 5 milliGRAM(s) Oral three times a day    PRN MEDICATIONS  acetaminophen   Tablet .. 650 milliGRAM(s) Oral every 6 hours PRN  dextrose 40% Gel 15 Gram(s) Oral once PRN  glucagon  Injectable 1 milliGRAM(s) IntraMuscular once PRN  morphine  - Injectable 2 milliGRAM(s) IV Push every 3 hours PRN  simethicone 80 milliGRAM(s) Chew four times a day PRN  traMADol 50 milliGRAM(s) Oral three times a day PRN    VITALS:   T(F): 96.4  HR: 67  BP: 144/70  RR: 18  SpO2: --    PHYSICAL EXAM:  GEN: No acute distress  LUNGS: Clear to auscultation bilaterally   HEART: S1/S2 present. RRR.   ABD: Soft, non-tender, non-distended. Bowel sounds present  EXT: NC/NC/NE/2+PP/PATTERSON  NEURO: AAOX3      LABS:                        8.8    10.95 )-----------( 82       ( 01 Oct 2018 05:53 )             29.7     10-01    134<L>  |  96<L>  |  32<H>  ----------------------------<  167<H>  4.5   |  22  |  1.0    Ca    8.7      01 Oct 2018 05:53  Mg     1.7     10-01                        RADIOLOGY: SUBJECTIVE:    Patient is a 68y old Female who presents with a chief complaint of mid back pain of 4 days (01 Oct 2018 07:33)    Currently admitted to medicine with the primary diagnosis of Intractable pain     Today is hospital day 4d. Pt still complaints of back pain.    PAST MEDICAL & SURGICAL HISTORY  Anemia  Osteoporosis  Rheumatoid arthritis  Dyslipidemia  HTN (hypertension)  CAD (coronary artery disease)  Diabetes  History of hemorrhoidectomy    SOCIAL HISTORY:  Negative for smoking/alcohol/drug use.     ALLERGIES:  penicillin (Anaphylaxis; Angioedema)    MEDICATIONS:  STANDING MEDICATIONS  atorvastatin 40 milliGRAM(s) Oral at bedtime  clopidogrel Tablet 75 milliGRAM(s) Oral daily  dextrose 5%. 1000 milliLiter(s) IV Continuous <Continuous>  dextrose 50% Injectable 12.5 Gram(s) IV Push once  dextrose 50% Injectable 25 Gram(s) IV Push once  dextrose 50% Injectable 25 Gram(s) IV Push once  enoxaparin Injectable 50 milliGRAM(s) SubCutaneous every 12 hours  furosemide    Tablet 40 milliGRAM(s) Oral daily  gabapentin 400 milliGRAM(s) Oral three times a day  HYDROmorphone  Injectable 1 milliGRAM(s) IV Push once  influenza   Vaccine 0.5 milliLiter(s) IntraMuscular once  insulin glargine Injectable (LANTUS) 25 Unit(s) SubCutaneous two times a day  insulin lispro (HumaLOG) corrective regimen sliding scale   SubCutaneous three times a day before meals  insulin lispro Injectable (HumaLOG) 7 Unit(s) SubCutaneous three times a day before meals  losartan 50 milliGRAM(s) Oral daily  pantoprazole    Tablet 40 milliGRAM(s) Oral before breakfast  predniSONE   Tablet 5 milliGRAM(s) Oral three times a day    PRN MEDICATIONS  acetaminophen   Tablet .. 650 milliGRAM(s) Oral every 6 hours PRN  dextrose 40% Gel 15 Gram(s) Oral once PRN  glucagon  Injectable 1 milliGRAM(s) IntraMuscular once PRN  morphine  - Injectable 2 milliGRAM(s) IV Push every 3 hours PRN  simethicone 80 milliGRAM(s) Chew four times a day PRN  traMADol 50 milliGRAM(s) Oral three times a day PRN    VITALS:   T(F): 96.4  HR: 67  BP: 144/70  RR: 18  SpO2: --    PHYSICAL EXAM:  GEN: No acute distress  LUNGS: Clear to auscultation bilaterally   HEART: S1/S2 present  ABD: Soft, non-tender, non-distended. Bowel sounds present  EXT and Back :- Tenderness at thoracic and lumbar spine area on palpation. Motor strength Left lower extremity 3/5 , sensation are normal to crude touch.  NEURO: AAOX3      LABS:                        8.8    10.95 )-----------( 82       ( 01 Oct 2018 05:53 )             29.7     10-01    134<L>  |  96<L>  |  32<H>  ----------------------------<  167<H>  4.5   |  22  |  1.0    Ca    8.7      01 Oct 2018 05:53  Mg     1.7     10-01                        RADIOLOGY:

## 2018-10-02 LAB
ANION GAP SERPL CALC-SCNC: 13 MMOL/L — SIGNIFICANT CHANGE UP (ref 7–14)
BUN SERPL-MCNC: 39 MG/DL — HIGH (ref 10–20)
CALCIUM SERPL-MCNC: 8.4 MG/DL — LOW (ref 8.5–10.1)
CHLORIDE SERPL-SCNC: 96 MMOL/L — LOW (ref 98–110)
CO2 SERPL-SCNC: 25 MMOL/L — SIGNIFICANT CHANGE UP (ref 17–32)
CREAT SERPL-MCNC: 1.2 MG/DL — SIGNIFICANT CHANGE UP (ref 0.7–1.5)
GLUCOSE BLDC GLUCOMTR-MCNC: 172 MG/DL — HIGH (ref 70–99)
GLUCOSE BLDC GLUCOMTR-MCNC: 207 MG/DL — HIGH (ref 70–99)
GLUCOSE BLDC GLUCOMTR-MCNC: 272 MG/DL — HIGH (ref 70–99)
GLUCOSE SERPL-MCNC: 225 MG/DL — HIGH (ref 70–99)
HCT VFR BLD CALC: 29.8 % — LOW (ref 37–47)
HGB BLD-MCNC: 9.1 G/DL — LOW (ref 12–16)
MCHC RBC-ENTMCNC: 18.7 PG — LOW (ref 27–31)
MCHC RBC-ENTMCNC: 30.5 G/DL — LOW (ref 32–37)
MCV RBC AUTO: 61.2 FL — LOW (ref 81–99)
NRBC # BLD: 0 /100 WBCS — SIGNIFICANT CHANGE UP (ref 0–0)
PLATELET # BLD AUTO: 108 K/UL — LOW (ref 130–400)
POTASSIUM SERPL-MCNC: 4.7 MMOL/L — SIGNIFICANT CHANGE UP (ref 3.5–5)
POTASSIUM SERPL-SCNC: 4.7 MMOL/L — SIGNIFICANT CHANGE UP (ref 3.5–5)
RBC # BLD: 4.87 M/UL — SIGNIFICANT CHANGE UP (ref 4.2–5.4)
RBC # FLD: 16.6 % — HIGH (ref 11.5–14.5)
SODIUM SERPL-SCNC: 134 MMOL/L — LOW (ref 135–146)
WBC # BLD: 11.49 K/UL — HIGH (ref 4.8–10.8)
WBC # FLD AUTO: 11.49 K/UL — HIGH (ref 4.8–10.8)

## 2018-10-02 RX ORDER — LACTULOSE 10 G/15ML
40 SOLUTION ORAL EVERY 4 HOURS
Qty: 0 | Refills: 0 | Status: DISCONTINUED | OUTPATIENT
Start: 2018-10-02 | End: 2018-10-02

## 2018-10-02 RX ORDER — LACTULOSE 10 G/15ML
20 SOLUTION ORAL EVERY 4 HOURS
Qty: 0 | Refills: 0 | Status: DISCONTINUED | OUTPATIENT
Start: 2018-10-02 | End: 2018-10-03

## 2018-10-02 RX ADMIN — LACTULOSE 20 GRAM(S): 10 SOLUTION ORAL at 14:32

## 2018-10-02 RX ADMIN — INSULIN GLARGINE 25 UNIT(S): 100 INJECTION, SOLUTION SUBCUTANEOUS at 12:42

## 2018-10-02 RX ADMIN — MORPHINE SULFATE 4 MILLIGRAM(S): 50 CAPSULE, EXTENDED RELEASE ORAL at 14:26

## 2018-10-02 RX ADMIN — Medication 3: at 17:36

## 2018-10-02 RX ADMIN — ATORVASTATIN CALCIUM 40 MILLIGRAM(S): 80 TABLET, FILM COATED ORAL at 21:59

## 2018-10-02 RX ADMIN — Medication 1: at 12:42

## 2018-10-02 RX ADMIN — TRAMADOL HYDROCHLORIDE 50 MILLIGRAM(S): 50 TABLET ORAL at 21:59

## 2018-10-02 RX ADMIN — Medication 7 UNIT(S): at 17:36

## 2018-10-02 RX ADMIN — INSULIN GLARGINE 25 UNIT(S): 100 INJECTION, SOLUTION SUBCUTANEOUS at 21:58

## 2018-10-02 RX ADMIN — Medication 7 UNIT(S): at 12:41

## 2018-10-02 RX ADMIN — Medication 40 MILLIGRAM(S): at 05:58

## 2018-10-02 RX ADMIN — Medication 5 MILLIGRAM(S): at 21:59

## 2018-10-02 RX ADMIN — LACTULOSE 20 GRAM(S): 10 SOLUTION ORAL at 17:32

## 2018-10-02 RX ADMIN — ENOXAPARIN SODIUM 50 MILLIGRAM(S): 100 INJECTION SUBCUTANEOUS at 17:33

## 2018-10-02 RX ADMIN — Medication 81 MILLIGRAM(S): at 14:47

## 2018-10-02 RX ADMIN — PANTOPRAZOLE SODIUM 40 MILLIGRAM(S): 20 TABLET, DELAYED RELEASE ORAL at 06:06

## 2018-10-02 RX ADMIN — LOSARTAN POTASSIUM 50 MILLIGRAM(S): 100 TABLET, FILM COATED ORAL at 05:58

## 2018-10-02 RX ADMIN — MORPHINE SULFATE 4 MILLIGRAM(S): 50 CAPSULE, EXTENDED RELEASE ORAL at 14:56

## 2018-10-02 RX ADMIN — Medication 5 MILLIGRAM(S): at 05:58

## 2018-10-02 RX ADMIN — GABAPENTIN 400 MILLIGRAM(S): 400 CAPSULE ORAL at 06:01

## 2018-10-02 RX ADMIN — POLYETHYLENE GLYCOL 3350 17 GRAM(S): 17 POWDER, FOR SOLUTION ORAL at 17:34

## 2018-10-02 RX ADMIN — GABAPENTIN 400 MILLIGRAM(S): 400 CAPSULE ORAL at 14:32

## 2018-10-02 RX ADMIN — Medication 2000 UNIT(S): at 12:43

## 2018-10-02 RX ADMIN — Medication 5 MILLIGRAM(S): at 14:28

## 2018-10-02 RX ADMIN — ENOXAPARIN SODIUM 50 MILLIGRAM(S): 100 INJECTION SUBCUTANEOUS at 05:59

## 2018-10-02 RX ADMIN — GABAPENTIN 400 MILLIGRAM(S): 400 CAPSULE ORAL at 21:59

## 2018-10-02 NOTE — PROGRESS NOTE ADULT - SUBJECTIVE AND OBJECTIVE BOX
Neurology Progress Note    Interval History:  Pt back pain improved but still recurs with movement.  Non-radiating into LE.  Per brother who is physician, pt has longstanding history of unspecified rheumatologic disease possibly PMR vs polymyositis.  Had muscle Bx 30 yrs ago at [facility] and placed on long-term steroids since then.  Has had multiple attempts to wean steroids with subsequent flare-up of mylagia, "stiffness" and weakness.  Currently at baseline with proximal weakness affecting ambulation.  Denies ptosis or diplopia.  Denies dysphagia or chewing difficulties.  No cramping or myalgias on prednisone currently.      PAST MEDICAL & SURGICAL HISTORY:  Anemia  Osteoporosis  Rheumatoid arthritis  Dyslipidemia  HTN (hypertension)  CAD (coronary artery disease)  Diabetes  History of hemorrhoidectomy    Medications:  acetaminophen   Tablet .. 650 milliGRAM(s) Oral every 6 hours PRN  aspirin  chewable 81 milliGRAM(s) Oral daily  atorvastatin 40 milliGRAM(s) Oral at bedtime  cholecalciferol 2000 Unit(s) Oral daily  dextrose 40% Gel 15 Gram(s) Oral once PRN  dextrose 5%. 1000 milliLiter(s) IV Continuous <Continuous>  dextrose 50% Injectable 12.5 Gram(s) IV Push once  dextrose 50% Injectable 25 Gram(s) IV Push once  dextrose 50% Injectable 25 Gram(s) IV Push once  enoxaparin Injectable 50 milliGRAM(s) SubCutaneous every 12 hours  furosemide    Tablet 40 milliGRAM(s) Oral daily  gabapentin 400 milliGRAM(s) Oral three times a day  glucagon  Injectable 1 milliGRAM(s) IntraMuscular once PRN  HYDROmorphone  Injectable 1 milliGRAM(s) IV Push once  influenza   Vaccine 0.5 milliLiter(s) IntraMuscular once  insulin glargine Injectable (LANTUS) 25 Unit(s) SubCutaneous two times a day  insulin lispro (HumaLOG) corrective regimen sliding scale   SubCutaneous three times a day before meals  insulin lispro Injectable (HumaLOG) 7 Unit(s) SubCutaneous three times a day before meals  lactulose Syrup 20 Gram(s) Oral every 4 hours  losartan 50 milliGRAM(s) Oral daily  morphine  - Injectable 4 milliGRAM(s) IV Push every 3 hours PRN  pantoprazole    Tablet 40 milliGRAM(s) Oral before breakfast  polyethylene glycol 3350 17 Gram(s) Oral every 12 hours  predniSONE   Tablet 5 milliGRAM(s) Oral three times a day  simethicone 80 milliGRAM(s) Chew four times a day PRN  traMADol 50 milliGRAM(s) Oral three times a day PRN      Vital Signs Last 24 Hrs  T(C): 35.9 (02 Oct 2018 15:59), Max: 36.5 (01 Oct 2018 23:49)  T(F): 96.6 (02 Oct 2018 15:59), Max: 97.7 (01 Oct 2018 23:49)  HR: 70 (02 Oct 2018 15:59) (61 - 70)  BP: 160/72 (02 Oct 2018 15:59) (111/59 - 160/72)  BP(mean): --  RR: 20 (02 Oct 2018 15:59) (18 - 20)  SpO2: --    Neurological Exam:   Mental status: Awake, alert and oriented x3.  Recent and remote memory intact.  Naming, repetition and comprehension intact.  Attention/concentration intact.  No dysarthria, no aphasia.  Fund of knowledge appropriate.    Cranial nerves: Pupils equally round and reactive to light, visual fields full, no nystagmus, extraocular muscles intact, V1 through V3 intact bilaterally and symmetric, face symmetric, hearing intact to finger rub, palate elevation symmetric, tongue was midline.  Motor:  MRC grading 4+/4+ proximal UE, 5/5 distal UE; 4+/4+ proximal LE, 5/5 distal LE.   strength 5/5.  Normal tone and bulk.  No abnormal movements.  NF/E 4+/5-   Sensation: Intact to light touch, proprioception, and pinprick.   Coordination: No dysmetria on finger-to-nose and heel-to-shin.  No dysdiadokinesia.  Reflexes: 2+ in bilateral UE, 0+ b/l LE, downgoing toes bilaterally. (-) Gerard/clonus  Gait: N/A    Labs:  CBC Full  -  ( 02 Oct 2018 06:21 )  WBC Count : 11.49 K/uL  Hemoglobin : 9.1 g/dL  Hematocrit : 29.8 %  Platelet Count - Automated : 108 K/uL  Mean Cell Volume : 61.2 fL  Mean Cell Hemoglobin : 18.7 pg  Mean Cell Hemoglobin Concentration : 30.5 g/dL    10-02    134<L>  |  96<L>  |  39<H>  ----------------------------<  225<H>  4.7   |  25  |  1.2    Ca    8.4<L>      02 Oct 2018 06:21  Mg     1.7     10-01

## 2018-10-02 NOTE — PROGRESS NOTE ADULT - ASSESSMENT
67 yo female patient with PMHx HTN, DM II, CAD/CABG x2 (last in 2013), DL, RA on chronic prednisone 15mg daily for 30 years complicated by osteoporosis and vertebral fractures s/p kyphoplasty 3 months ago, anemia, presenting because of worsening mid back pain and abdominal pain found with thoracic compression Fx currently with longstanding proximal weakness doubt cord compression or lumbar stenosis in the absence of radiating pain.  Pt carries diagnosis of PMR vs polymyositis on long-term steroids.  Proximal weakness may also be secondary to steroid myopathy but hard to differentiate at this point since pt has been on chronic steroids.  recommend continue symptomatic treatment for pain from thoracic compression fracture and recommend starting steroid sparing agents if ok with rheumatology.     Plan:  - recommend rheumatology eval with Dr. Belcher (pt previously seen by their group)  - check aldolase, TSH, thyroglobulin, TPO Ab, T3, T4, ACTH, aldosterone, NT5C1A (anti-CN-1a) ab, Louise-1 ab, HMG CoA reductase ab, SRP ab, ESR, CRP, NIEVES, ANCA, SSA, SSB, B2 glycoprotein, MuSK ab, LRP4 ab, AchR ab, anti-Mi2 ab, ACE level, lyme wb  - consider starting azathioprine 25 mg QD if ok with rheumatology  - neurosurgery f/u for thoracic compression fracture  - outpt EMG/NCS  - PT/OT  - check echo if not done already

## 2018-10-02 NOTE — PROGRESS NOTE ADULT - ASSESSMENT
#New compression fracture T11 and 12  - CT Abdomen and Pelvis w/ IV Cont (09.27.18 @ 00:32) New T11 and T12 moderate compression deformities. Unchanged moderate compression deformities of L3, L4 post kyphoplasty of L4.  - Neurosurgery eval: get MR (LS) under anesthesia (discussed w/ anesthesia, will be done this week)  - pain control for back pain: tramadol prn tid, and morphine 2mg q3h prn (pain not adequately controlled- increased frequency from q4 to q3)  - MRI was performed today , will f/u the report.     #Acute DVT left femoral  - C/w lovenox SQ BD  - Patient could net get VQ scan due to pain while lying back , not tachycardic, O2 >95 on RA, get VQ scan once pain under control echo    #Abd pain - probably referred pain  - c/w pain meds  - colace, senna for constipation  -Will add lactulose for no BM    # troponemia:  - patient denies angina or shortness of breath  - d/w cardiac fellow, no intervention in setting of asymptomatic troponemia, possibly demand 2/2 anemia  - f/u third set CE negative     #RA  - c/w prednisone  - out pt F/u with rheumatology    #Thrombocytopenia + Microcytic anemia  - H/o hemorrhoids S/p hemorrhoidectomy  --Microcytic anemia Secondary to thalassemia minor  --Continue to monitor hemoglobin.   hematology eval:Probably related to consumption from DVT d/t acuity of drop in counts.    --Repeat CBC, if the counts are persistently low, will do a complete w/u including BM biopsy  -will continue to monitor the counts     DVT PPX :Pt already on Lovenox for DVT in left femoral  -Plavix changed to ASA.

## 2018-10-02 NOTE — PRE-ANESTHESIA EVALUATION ADULT - NSANTHADDINFOFT_GEN_ALL_CORE
Spoke to patient wishes to have sedation by MRI dept staff "light sedation"  However have instructed RN in MRI if greater level of sedation will be avalable to intervene.  Patient understands and wishes to proceed with above plan.

## 2018-10-02 NOTE — PHARMACOTHERAPY INTERVENTION NOTE - COMMENTS
Prescriber was contacted with recommendation to review the dose, frequency and duration of Lactulose 40 grams po q4h.

## 2018-10-02 NOTE — PROGRESS NOTE ADULT - SUBJECTIVE AND OBJECTIVE BOX
SUBJECTIVE:    Patient is a 68y old Female who presents with a chief complaint of mid back pain of 4 days (01 Oct 2018 16:44)    Currently admitted to medicine with the primary diagnosis of Intractable pain     Today is hospital day 5d. This morning she is resting comfortably in bed and reports no new issues or overnight events.     PAST MEDICAL & SURGICAL HISTORY  Anemia  Osteoporosis  Rheumatoid arthritis  Dyslipidemia  HTN (hypertension)  CAD (coronary artery disease)  Diabetes  History of hemorrhoidectomy    SOCIAL HISTORY:  Negative for smoking/alcohol/drug use.     ALLERGIES:  penicillin (Anaphylaxis; Angioedema)    MEDICATIONS:  STANDING MEDICATIONS  aspirin  chewable 81 milliGRAM(s) Oral daily  atorvastatin 40 milliGRAM(s) Oral at bedtime  cholecalciferol 2000 Unit(s) Oral daily  dextrose 5%. 1000 milliLiter(s) IV Continuous <Continuous>  dextrose 50% Injectable 12.5 Gram(s) IV Push once  dextrose 50% Injectable 25 Gram(s) IV Push once  dextrose 50% Injectable 25 Gram(s) IV Push once  enoxaparin Injectable 50 milliGRAM(s) SubCutaneous every 12 hours  furosemide    Tablet 40 milliGRAM(s) Oral daily  gabapentin 400 milliGRAM(s) Oral three times a day  HYDROmorphone  Injectable 1 milliGRAM(s) IV Push once  influenza   Vaccine 0.5 milliLiter(s) IntraMuscular once  insulin glargine Injectable (LANTUS) 25 Unit(s) SubCutaneous two times a day  insulin lispro (HumaLOG) corrective regimen sliding scale   SubCutaneous three times a day before meals  insulin lispro Injectable (HumaLOG) 7 Unit(s) SubCutaneous three times a day before meals  losartan 50 milliGRAM(s) Oral daily  pantoprazole    Tablet 40 milliGRAM(s) Oral before breakfast  polyethylene glycol 3350 17 Gram(s) Oral every 12 hours  predniSONE   Tablet 5 milliGRAM(s) Oral three times a day    PRN MEDICATIONS  acetaminophen   Tablet .. 650 milliGRAM(s) Oral every 6 hours PRN  dextrose 40% Gel 15 Gram(s) Oral once PRN  glucagon  Injectable 1 milliGRAM(s) IntraMuscular once PRN  morphine  - Injectable 4 milliGRAM(s) IV Push every 3 hours PRN  simethicone 80 milliGRAM(s) Chew four times a day PRN  traMADol 50 milliGRAM(s) Oral three times a day PRN    VITALS:   T(F): 96.1  HR: 61  BP: 116/66  RR: 18  SpO2: --    PHYSICAL EXAM:  GEN: No acute distress  LUNGS: Clear to auscultation bilaterally   HEART: S1/S2 present  ABD: Soft, non-tender, non-distended. Bowel sounds present  EXT and Back :- Tenderness at thoracic and lumbar spine area on palpation. Motor strength Left lower extremity 3/5 , sensation are normal to crude touch.  NEURO: AAOX3          LABS:                        9.1    11.49 )-----------( 108      ( 02 Oct 2018 06:21 )             29.8     10-02    134<L>  |  96<L>  |  39<H>  ----------------------------<  225<H>  4.7   |  25  |  1.2    Ca    8.4<L>      02 Oct 2018 06:21  Mg     1.7     10-01                        RADIOLOGY: SUBJECTIVE:    Patient is a 68y old Female who presents with a chief complaint of mid back pain of 4 days (01 Oct 2018 16:44)    Currently admitted to medicine with the primary diagnosis of Intractable pain     Today is hospital day 5d. This morning she is resting comfortably in bed and reports no new issues or overnight events.     PAST MEDICAL & SURGICAL HISTORY  Anemia  Osteoporosis  Rheumatoid arthritis  Dyslipidemia  HTN (hypertension)  CAD (coronary artery disease)  Diabetes  History of hemorrhoidectomy    SOCIAL HISTORY:  Negative for smoking/alcohol/drug use.     ALLERGIES:  penicillin (Anaphylaxis; Angioedema)    MEDICATIONS:  STANDING MEDICATIONS  aspirin  chewable 81 milliGRAM(s) Oral daily  atorvastatin 40 milliGRAM(s) Oral at bedtime  cholecalciferol 2000 Unit(s) Oral daily  dextrose 5%. 1000 milliLiter(s) IV Continuous <Continuous>  dextrose 50% Injectable 12.5 Gram(s) IV Push once  dextrose 50% Injectable 25 Gram(s) IV Push once  dextrose 50% Injectable 25 Gram(s) IV Push once  enoxaparin Injectable 50 milliGRAM(s) SubCutaneous every 12 hours  furosemide    Tablet 40 milliGRAM(s) Oral daily  gabapentin 400 milliGRAM(s) Oral three times a day  HYDROmorphone  Injectable 1 milliGRAM(s) IV Push once  influenza   Vaccine 0.5 milliLiter(s) IntraMuscular once  insulin glargine Injectable (LANTUS) 25 Unit(s) SubCutaneous two times a day  insulin lispro (HumaLOG) corrective regimen sliding scale   SubCutaneous three times a day before meals  insulin lispro Injectable (HumaLOG) 7 Unit(s) SubCutaneous three times a day before meals  losartan 50 milliGRAM(s) Oral daily  pantoprazole    Tablet 40 milliGRAM(s) Oral before breakfast  polyethylene glycol 3350 17 Gram(s) Oral every 12 hours  predniSONE   Tablet 5 milliGRAM(s) Oral three times a day    PRN MEDICATIONS  acetaminophen   Tablet .. 650 milliGRAM(s) Oral every 6 hours PRN  dextrose 40% Gel 15 Gram(s) Oral once PRN  glucagon  Injectable 1 milliGRAM(s) IntraMuscular once PRN  morphine  - Injectable 4 milliGRAM(s) IV Push every 3 hours PRN  simethicone 80 milliGRAM(s) Chew four times a day PRN  traMADol 50 milliGRAM(s) Oral three times a day PRN    VITALS:   T(F): 96.1  HR: 61  BP: 116/66  RR: 18  SpO2: --    PHYSICAL EXAM:  GEN: No acute distress  LUNGS: Clear to auscultation bilaterally   HEART: S1/S2 present  ABD: Soft, non-tender, non-distended. Bowel sounds present  EXT and Back :- Tenderness at thoracic and lumbar spine area on palpation. Motor strength Left lower extremity 3/5 , sensation are normal to crude touch.  NEURO: AAOX3        LABS:                        9.1    11.49 )-----------( 108      ( 02 Oct 2018 06:21 )             29.8     10-02    134<L>  |  96<L>  |  39<H>  ----------------------------<  225<H>  4.7   |  25  |  1.2    Ca    8.4<L>      02 Oct 2018 06:21  Mg     1.7     10-01                        RADIOLOGY:

## 2018-10-03 LAB
ANION GAP SERPL CALC-SCNC: 17 MMOL/L — HIGH (ref 7–14)
APTT BLD: 42.2 SEC — HIGH (ref 27–39.2)
BUN SERPL-MCNC: 47 MG/DL — HIGH (ref 10–20)
CALCIUM SERPL-MCNC: 8.6 MG/DL — SIGNIFICANT CHANGE UP (ref 8.5–10.1)
CHLORIDE SERPL-SCNC: 93 MMOL/L — LOW (ref 98–110)
CO2 SERPL-SCNC: 24 MMOL/L — SIGNIFICANT CHANGE UP (ref 17–32)
CREAT SERPL-MCNC: 1.2 MG/DL — SIGNIFICANT CHANGE UP (ref 0.7–1.5)
GLUCOSE BLDC GLUCOMTR-MCNC: 261 MG/DL — HIGH (ref 70–99)
GLUCOSE BLDC GLUCOMTR-MCNC: 277 MG/DL — HIGH (ref 70–99)
GLUCOSE BLDC GLUCOMTR-MCNC: 317 MG/DL — HIGH (ref 70–99)
GLUCOSE BLDC GLUCOMTR-MCNC: 355 MG/DL — HIGH (ref 70–99)
GLUCOSE SERPL-MCNC: 247 MG/DL — HIGH (ref 70–99)
HCT VFR BLD CALC: 30.3 % — LOW (ref 37–47)
HGB BLD-MCNC: 9.2 G/DL — LOW (ref 12–16)
INR BLD: 1.06 RATIO — SIGNIFICANT CHANGE UP (ref 0.65–1.3)
MAGNESIUM SERPL-MCNC: 2 MG/DL — SIGNIFICANT CHANGE UP (ref 1.8–2.4)
MCHC RBC-ENTMCNC: 18.7 PG — LOW (ref 27–31)
MCHC RBC-ENTMCNC: 30.4 G/DL — LOW (ref 32–37)
MCV RBC AUTO: 61.6 FL — LOW (ref 81–99)
NRBC # BLD: 0 /100 WBCS — SIGNIFICANT CHANGE UP (ref 0–0)
PHOSPHATE SERPL-MCNC: 4.6 MG/DL — SIGNIFICANT CHANGE UP (ref 2.1–4.9)
PLATELET # BLD AUTO: 101 K/UL — LOW (ref 130–400)
POTASSIUM SERPL-MCNC: 4.7 MMOL/L — SIGNIFICANT CHANGE UP (ref 3.5–5)
POTASSIUM SERPL-SCNC: 4.7 MMOL/L — SIGNIFICANT CHANGE UP (ref 3.5–5)
PROTHROM AB SERPL-ACNC: 11.4 SEC — SIGNIFICANT CHANGE UP (ref 9.95–12.87)
RBC # BLD: 4.92 M/UL — SIGNIFICANT CHANGE UP (ref 4.2–5.4)
RBC # FLD: 16.3 % — HIGH (ref 11.5–14.5)
SODIUM SERPL-SCNC: 134 MMOL/L — LOW (ref 135–146)
WBC # BLD: 13.3 K/UL — HIGH (ref 4.8–10.8)
WBC # FLD AUTO: 13.3 K/UL — HIGH (ref 4.8–10.8)

## 2018-10-03 RX ORDER — INSULIN LISPRO 100/ML
3 VIAL (ML) SUBCUTANEOUS ONCE
Qty: 0 | Refills: 0 | Status: COMPLETED | OUTPATIENT
Start: 2018-10-03 | End: 2018-10-03

## 2018-10-03 RX ADMIN — Medication 2000 UNIT(S): at 11:23

## 2018-10-03 RX ADMIN — Medication 5 MILLIGRAM(S): at 21:16

## 2018-10-03 RX ADMIN — INSULIN GLARGINE 25 UNIT(S): 100 INJECTION, SOLUTION SUBCUTANEOUS at 08:31

## 2018-10-03 RX ADMIN — LOSARTAN POTASSIUM 50 MILLIGRAM(S): 100 TABLET, FILM COATED ORAL at 06:40

## 2018-10-03 RX ADMIN — Medication 5 MILLIGRAM(S): at 06:40

## 2018-10-03 RX ADMIN — GABAPENTIN 400 MILLIGRAM(S): 400 CAPSULE ORAL at 06:40

## 2018-10-03 RX ADMIN — INSULIN GLARGINE 25 UNIT(S): 100 INJECTION, SOLUTION SUBCUTANEOUS at 21:42

## 2018-10-03 RX ADMIN — ATORVASTATIN CALCIUM 40 MILLIGRAM(S): 80 TABLET, FILM COATED ORAL at 21:16

## 2018-10-03 RX ADMIN — PANTOPRAZOLE SODIUM 40 MILLIGRAM(S): 20 TABLET, DELAYED RELEASE ORAL at 06:40

## 2018-10-03 RX ADMIN — GABAPENTIN 400 MILLIGRAM(S): 400 CAPSULE ORAL at 21:16

## 2018-10-03 RX ADMIN — Medication 3: at 17:44

## 2018-10-03 RX ADMIN — Medication 81 MILLIGRAM(S): at 11:22

## 2018-10-03 RX ADMIN — Medication 3: at 08:30

## 2018-10-03 RX ADMIN — Medication 7 UNIT(S): at 11:34

## 2018-10-03 RX ADMIN — MORPHINE SULFATE 4 MILLIGRAM(S): 50 CAPSULE, EXTENDED RELEASE ORAL at 11:29

## 2018-10-03 RX ADMIN — Medication 7 UNIT(S): at 08:29

## 2018-10-03 RX ADMIN — ENOXAPARIN SODIUM 50 MILLIGRAM(S): 100 INJECTION SUBCUTANEOUS at 17:43

## 2018-10-03 RX ADMIN — Medication 40 MILLIGRAM(S): at 06:40

## 2018-10-03 RX ADMIN — Medication 3 UNIT(S): at 22:06

## 2018-10-03 RX ADMIN — Medication 6: at 11:35

## 2018-10-03 RX ADMIN — TRAMADOL HYDROCHLORIDE 50 MILLIGRAM(S): 50 TABLET ORAL at 06:48

## 2018-10-03 RX ADMIN — ENOXAPARIN SODIUM 50 MILLIGRAM(S): 100 INJECTION SUBCUTANEOUS at 06:40

## 2018-10-03 RX ADMIN — Medication 5 MILLIGRAM(S): at 15:38

## 2018-10-03 RX ADMIN — GABAPENTIN 400 MILLIGRAM(S): 400 CAPSULE ORAL at 15:38

## 2018-10-03 RX ADMIN — Medication 7 UNIT(S): at 17:44

## 2018-10-03 NOTE — CONSULT NOTE ADULT - CONSULT REASON
Anemia, thrombocytopenia
T11 and T12 compression fxs
pre op risk stratification
r/o compression fracture
Right leg pain

## 2018-10-03 NOTE — CONSULT NOTE ADULT - SUBJECTIVE AND OBJECTIVE BOX
HPI:  69 yo female patient with PMHx HTN, DM II, CAD/CABG x2 (last in 2013) and PCI to graft 2 yrs ago, DL, RA on chronic prednisone 15mg daily for 30 years complicated by osteoporosis and vertebral fractures s/p kyphoplasty 3 months ago, anemia, presenting because of worsening mid back pain and abdominal pain over the last 4 days.  Patient reports mid back severe pain, started spontaneously 4 days ago, no back trauma or injury. Pain is sharp, poorly relieved by tylenol. Unable to lay flat, she says she's been sleeping sitting on a sofa. Later on, she started experiencing abdominal pain, colicky, mainly at the RUQ, moderate in intensity, non radiating, not related to food intake, no nausea or vomiting or diarrhea associated, no fever or chills. No other complaints.  Note that patient has had decreased physical activity it's been around 9 months since she had the vertebral fractures. She spends most of her time in the sofa.  MRI showed T10 Fx with cord compression , pt is planned for Sx tomorrow.    PAST MEDICAL & SURGICAL HISTORY  Anemia  Osteoporosis  Rheumatoid arthritis  Dyslipidemia  HTN (hypertension)  CAD (coronary artery disease)  Diabetes  History of hemorrhoidectomy      FAMILY HISTORY:  FAMILY HISTORY:  Family history of thalassemia (Sibling)  Family history of coronary artery disease (Father)      SOCIAL HISTORY:  []smoker  []Alcohol  []Drug    ALLERGIES:  penicillin (Anaphylaxis; Angioedema)      MEDICATIONS:  MEDICATIONS  (STANDING):  aspirin  chewable 81 milliGRAM(s) Oral daily  atorvastatin 40 milliGRAM(s) Oral at bedtime  cholecalciferol 2000 Unit(s) Oral daily  dextrose 5%. 1000 milliLiter(s) (50 mL/Hr) IV Continuous <Continuous>  dextrose 50% Injectable 12.5 Gram(s) IV Push once  dextrose 50% Injectable 25 Gram(s) IV Push once  dextrose 50% Injectable 25 Gram(s) IV Push once  enoxaparin Injectable 50 milliGRAM(s) SubCutaneous every 12 hours  furosemide    Tablet 40 milliGRAM(s) Oral daily  gabapentin 400 milliGRAM(s) Oral three times a day  HYDROmorphone  Injectable 1 milliGRAM(s) IV Push once  influenza   Vaccine 0.5 milliLiter(s) IntraMuscular once  insulin glargine Injectable (LANTUS) 25 Unit(s) SubCutaneous two times a day  insulin lispro (HumaLOG) corrective regimen sliding scale   SubCutaneous three times a day before meals  insulin lispro Injectable (HumaLOG) 7 Unit(s) SubCutaneous three times a day before meals  losartan 50 milliGRAM(s) Oral daily  pantoprazole    Tablet 40 milliGRAM(s) Oral before breakfast  polyethylene glycol 3350 17 Gram(s) Oral every 12 hours  predniSONE   Tablet 5 milliGRAM(s) Oral three times a day    MEDICATIONS  (PRN):  acetaminophen   Tablet .. 650 milliGRAM(s) Oral every 6 hours PRN Mild Pain (1 - 3)  dextrose 40% Gel 15 Gram(s) Oral once PRN Blood Glucose LESS THAN 70 milliGRAM(s)/deciliter  glucagon  Injectable 1 milliGRAM(s) IntraMuscular once PRN Glucose LESS THAN 70 milligrams/deciliter  morphine  - Injectable 4 milliGRAM(s) IV Push every 3 hours PRN Severe Pain (7 - 10)  simethicone 80 milliGRAM(s) Chew four times a day PRN Gas  traMADol 50 milliGRAM(s) Oral three times a day PRN Moderate Pain (4 - 6)      HOME MEDICATIONS:  Home Medications:  atorvastatin 40 mg oral tablet: 1 tab(s) orally once a day (at bedtime) (27 Sep 2018 10:09)  gabapentin 400 mg oral tablet: 1 tab(s) orally 2 times a day (27 Sep 2018 10:08)  isosorbide mononitrate 10 mg oral tablet: 1 tab(s) orally once a day (27 Sep 2018 10:09)  Januvia 100 mg oral tablet: 1 tab(s) orally once a day (27 Sep 2018 10:11)  Lasix 20 mg oral tablet: 1 tab(s) orally once a day, As Needed (27 Sep 2018 10:07)  Levemir FlexPen 100 units/mL subcutaneous solution: 30 unit(s) subcutaneous 2 times a day (27 Sep 2018 10:11)  losartan 50 mg oral tablet: 1 tab(s) orally once a day (27 Sep 2018 10:11)  metFORMIN 500 mg oral tablet: 2 tab(s) orally 3 times a day (2 in AM, 2 at noon, 1 at night) (27 Sep 2018 10:08)  omeprazole 20 mg oral delayed release capsule: 1 cap(s) orally once a day (27 Sep 2018 10:09)  Plavix 75 mg oral tablet: 1 tab(s) orally once a day (27 Sep 2018 10:10)  predniSONE 5 mg oral tablet: 1 tab(s) orally 3 times a day (27 Sep 2018 10:10)  raNITIdine 150 mg oral tablet: 1 tab(s) orally 2 times a day (27 Sep 2018 10:10)      VITALS:   T(F): 96.7 (10-03 @ 07:34), Max: 97.7 (10-01 @ 23:49)  HR: 63 (10-03 @ 07:34) (61 - 81)  BP: 142/67 (10-03 @ 07:34) (108/57 - 160/72)  BP(mean): --  RR: 16 (10-03 @ 07:34) (16 - 20)  SpO2: --    I&O's Summary      REVIEW OF SYSTEMS:  CONSTITUTIONAL: generalized weakness  EYES/ENT: No visual changes;  No vertigo or throat pain   NECK: No pain or stiffness  RESPIRATORY: sob on exertion  CARDIOVASCULAR: No chest pain or palpitations  GASTROINTESTINAL: + RUQ pain  GENITOURINARY: No dysuria, frequency or hematuria  NEUROLOGICAL: left leg weakness  SKIN: No itching, no rashes    PHYSICAL EXAM:  NEURO: patient is awake , alert and oriented  GEN: Not in acute distress  NECK: no thyroid enlargement, no JVD  LUNGS: Clear to auscultation bilaterally   CARDIOVASCULAR: S1/S2 present,RRR , sys murmur 2/6 over aortic area  ABD: Soft, non-tender, non-distended, +BS  EXT: No BIN , left leg weakness      LABS:                        9.2    13.30 )-----------( 101      ( 03 Oct 2018 06:49 )             30.3     10-03    134<L>  |  93<L>  |  47<H>  ----------------------------<  247<H>  4.7   |  24  |  1.2    Ca    8.6      03 Oct 2018 06:49  Phos  4.6     10-03  Mg     2.0     10-03        RADIOLOGY:  -CXR:< from: Xray Chest 1 View- PORTABLE-Routine (09.27.18 @ 11:36) >  Impression:      No radiographic evidence of acute cardiopulmonary disease.    < end of copied text >    -TTE:< from: Transthoracic Echocardiogram (09.28.18 @ 08:23) >  Summary:   1. Trace tricuspid regurgitation.   2. Mild aortic regurgitation.   3. Trace pulmonic valve regurgitation.    < end of copied text >      ECG:  sinus rythm , poor r wave progeression and microvoltage in precordial leads , Q waves suggestive of old inferior MI

## 2018-10-03 NOTE — PROGRESS NOTE ADULT - SUBJECTIVE AND OBJECTIVE BOX
Surgery: Kyphoplasty      HPI  Procedure:  INTRACTABLE PAIN;ELEVATED LIPASE;COMPRESSION FRACTURE OF VERTEBRA  ^INTRACTABLE PAIN;ELEVATED LIPASE;COMPRESSION FRACTURE OF VERTEBRA  Family history of thalassemia (Sibling)  Family history of coronary artery disease (Father)  Handoff  MEWS Score  Anemia  Osteoporosis  Rheumatoid arthritis  Dyslipidemia  HTN (hypertension)  CAD (coronary artery disease)  Diabetes  Intractable pain  Suspected pulmonary embolism  Thrombophlebitis of the femoral vein  History of hemorrhoidectomy  ABD PAIN  Compression fracture of vertebra, non-traumatic  Elevated lipase    acetaminophen   Tablet .. 650 milliGRAM(s) Oral every 6 hours PRN  aspirin  chewable 81 milliGRAM(s) Oral daily  atorvastatin 40 milliGRAM(s) Oral at bedtime  cholecalciferol 2000 Unit(s) Oral daily  dextrose 40% Gel 15 Gram(s) Oral once PRN  dextrose 5%. 1000 milliLiter(s) IV Continuous <Continuous>  dextrose 50% Injectable 12.5 Gram(s) IV Push once  dextrose 50% Injectable 25 Gram(s) IV Push once  dextrose 50% Injectable 25 Gram(s) IV Push once  enoxaparin Injectable 50 milliGRAM(s) SubCutaneous every 12 hours  furosemide    Tablet 40 milliGRAM(s) Oral daily  gabapentin 400 milliGRAM(s) Oral three times a day  glucagon  Injectable 1 milliGRAM(s) IntraMuscular once PRN  HYDROmorphone  Injectable 1 milliGRAM(s) IV Push once  influenza   Vaccine 0.5 milliLiter(s) IntraMuscular once  insulin glargine Injectable (LANTUS) 25 Unit(s) SubCutaneous two times a day  insulin lispro (HumaLOG) corrective regimen sliding scale   SubCutaneous three times a day before meals  insulin lispro Injectable (HumaLOG) 7 Unit(s) SubCutaneous three times a day before meals  losartan 50 milliGRAM(s) Oral daily  morphine  - Injectable 4 milliGRAM(s) IV Push every 3 hours PRN  pantoprazole    Tablet 40 milliGRAM(s) Oral before breakfast  polyethylene glycol 3350 17 Gram(s) Oral every 12 hours  predniSONE   Tablet 5 milliGRAM(s) Oral three times a day  simethicone 80 milliGRAM(s) Chew four times a day PRN  traMADol 50 milliGRAM(s) Oral three times a day PRN    penicillin (Anaphylaxis; Angioedema)      10-03    134<L>  |  93<L>  |  47<H>  ----------------------------<  247<H>  4.7   |  24  |  1.2    Ca    8.6      03 Oct 2018 06:49  Phos  4.6     10-03  Mg     2.0     10-03      CBC Full  -  ( 03 Oct 2018 06:49 )  WBC Count : 13.30 K/uL  Hemoglobin : 9.2 g/dL  Hematocrit : 30.3 %  Platelet Count - Automated : 101 K/uL  Mean Cell Volume : 61.6 fL  Mean Cell Hemoglobin : 18.7 pg  Mean Cell Hemoglobin Concentration : 30.4 g/dL  Auto Neutrophil # : x  Auto Lymphocyte # : x  Auto Monocyte # : x  Auto Eosinophil # : x  Auto Basophil # : x  Auto Neutrophil % : x  Auto Lymphocyte % : x  Auto Monocyte % : x  Auto Eosinophil % : x  Auto Basophil % : x    PT/INR - ( 03 Oct 2018 12:37 )   PT: 11.40 sec;   INR: 1.06 ratio         PTT - ( 03 Oct 2018 12:37 )  PTT:42.2 sec  Pregnancy test:  Type & Screen (in past 72hrs):    CXR: < from: Xray Chest 1 View- PORTABLE-Routine (09.27.18 @ 11:36) >  No radiographic evidence of acute cardiopulmonary disease.    < end of copied text >    EKG: < from: 12 Lead ECG (09.28.18 @ 09:59) >  Diagnosis Line Normal sinus rhythm  Possible Left atrial enlargement  Left ventricular hypertrophy  Inferior infarct , age undetermined  Abnormal ECG    < end of copied text >    ECHO: < from: Transthoracic Echocardiogram (09.28.18 @ 08:23) >  Summary:   1. Trace tricuspid regurgitation.   2. Mild aortic regurgitation.   3. Trace pulmonic valve regurgitation.    < end of copied text >

## 2018-10-03 NOTE — PROGRESS NOTE ADULT - SUBJECTIVE AND OBJECTIVE BOX
SUBJECTIVE:    Patient is a 68y old Female who presents with a chief complaint of mid back pain of 4 days (02 Oct 2018 16:52)    Currently admitted to medicine with the primary diagnosis of Intractable pain     Today is hospital day 6d. Complaints of abdominal fullness and distention along with back pain.    PAST MEDICAL & SURGICAL HISTORY  Anemia  Osteoporosis  Rheumatoid arthritis  Dyslipidemia  HTN (hypertension)  CAD (coronary artery disease)  Diabetes  History of hemorrhoidectomy    SOCIAL HISTORY:  Negative for smoking/alcohol/drug use.     ALLERGIES:  penicillin (Anaphylaxis; Angioedema)    MEDICATIONS:  STANDING MEDICATIONS  aspirin  chewable 81 milliGRAM(s) Oral daily  atorvastatin 40 milliGRAM(s) Oral at bedtime  cholecalciferol 2000 Unit(s) Oral daily  dextrose 5%. 1000 milliLiter(s) IV Continuous <Continuous>  dextrose 50% Injectable 12.5 Gram(s) IV Push once  dextrose 50% Injectable 25 Gram(s) IV Push once  dextrose 50% Injectable 25 Gram(s) IV Push once  enoxaparin Injectable 50 milliGRAM(s) SubCutaneous every 12 hours  furosemide    Tablet 40 milliGRAM(s) Oral daily  gabapentin 400 milliGRAM(s) Oral three times a day  HYDROmorphone  Injectable 1 milliGRAM(s) IV Push once  influenza   Vaccine 0.5 milliLiter(s) IntraMuscular once  insulin glargine Injectable (LANTUS) 25 Unit(s) SubCutaneous two times a day  insulin lispro (HumaLOG) corrective regimen sliding scale   SubCutaneous three times a day before meals  insulin lispro Injectable (HumaLOG) 7 Unit(s) SubCutaneous three times a day before meals  losartan 50 milliGRAM(s) Oral daily  pantoprazole    Tablet 40 milliGRAM(s) Oral before breakfast  polyethylene glycol 3350 17 Gram(s) Oral every 12 hours  predniSONE   Tablet 5 milliGRAM(s) Oral three times a day    PRN MEDICATIONS  acetaminophen   Tablet .. 650 milliGRAM(s) Oral every 6 hours PRN  dextrose 40% Gel 15 Gram(s) Oral once PRN  glucagon  Injectable 1 milliGRAM(s) IntraMuscular once PRN  morphine  - Injectable 4 milliGRAM(s) IV Push every 3 hours PRN  simethicone 80 milliGRAM(s) Chew four times a day PRN  traMADol 50 milliGRAM(s) Oral three times a day PRN    VITALS:   T(F): 96.7  HR: 63  BP: 142/67  RR: 16  SpO2: --    PHYSICAL EXAM:  GEN: No acute distress  LUNGS: Clear to auscultation bilaterally   HEART: S1/S2 present  ABD: Soft, non-tender, non-distended. Bowel sounds present  EXT and Back :- Tenderness at thoracic and lumbar spine area on palpation. Motor strength Left lower extremity 3/5 , sensation are normal to crude touch.  NEURO: AAOX3        LABS:                        9.2    13.30 )-----------( 101      ( 03 Oct 2018 06:49 )             30.3     10-03    134<L>  |  93<L>  |  47<H>  ----------------------------<  247<H>  4.7   |  24  |  1.2    Ca    8.6      03 Oct 2018 06:49  Phos  4.6     10-03  Mg     2.0     10-03                        RADIOLOGY: SUBJECTIVE:    Patient is a 68y old Female who presents with a chief complaint of mid back pain of 4 days (02 Oct 2018 16:52)    Currently admitted to medicine with the primary diagnosis of Intractable pain     Today is hospital day 6d. Complaints of abdominal fullness and distention along with back pain.    PAST MEDICAL & SURGICAL HISTORY  Anemia  Osteoporosis  Rheumatoid arthritis  Dyslipidemia  HTN (hypertension)  CAD (coronary artery disease)  Diabetes  History of hemorrhoidectomy    SOCIAL HISTORY:  Negative for smoking/alcohol/drug use.     ALLERGIES:  penicillin (Anaphylaxis; Angioedema)    MEDICATIONS:  STANDING MEDICATIONS  aspirin  chewable 81 milliGRAM(s) Oral daily  atorvastatin 40 milliGRAM(s) Oral at bedtime  cholecalciferol 2000 Unit(s) Oral daily  dextrose 5%. 1000 milliLiter(s) IV Continuous <Continuous>  dextrose 50% Injectable 12.5 Gram(s) IV Push once  dextrose 50% Injectable 25 Gram(s) IV Push once  dextrose 50% Injectable 25 Gram(s) IV Push once  enoxaparin Injectable 50 milliGRAM(s) SubCutaneous every 12 hours  furosemide    Tablet 40 milliGRAM(s) Oral daily  gabapentin 400 milliGRAM(s) Oral three times a day  HYDROmorphone  Injectable 1 milliGRAM(s) IV Push once  influenza   Vaccine 0.5 milliLiter(s) IntraMuscular once  insulin glargine Injectable (LANTUS) 25 Unit(s) SubCutaneous two times a day  insulin lispro (HumaLOG) corrective regimen sliding scale   SubCutaneous three times a day before meals  insulin lispro Injectable (HumaLOG) 7 Unit(s) SubCutaneous three times a day before meals  losartan 50 milliGRAM(s) Oral daily  pantoprazole    Tablet 40 milliGRAM(s) Oral before breakfast  polyethylene glycol 3350 17 Gram(s) Oral every 12 hours  predniSONE   Tablet 5 milliGRAM(s) Oral three times a day    PRN MEDICATIONS  acetaminophen   Tablet .. 650 milliGRAM(s) Oral every 6 hours PRN  dextrose 40% Gel 15 Gram(s) Oral once PRN  glucagon  Injectable 1 milliGRAM(s) IntraMuscular once PRN  morphine  - Injectable 4 milliGRAM(s) IV Push every 3 hours PRN  simethicone 80 milliGRAM(s) Chew four times a day PRN  traMADol 50 milliGRAM(s) Oral three times a day PRN    VITALS:   T(F): 96.7  HR: 63  BP: 142/67  RR: 16  SpO2: --    PHYSICAL EXAM:  GEN: No acute distress  LUNGS: Clear to auscultation bilaterally   HEART: S1/S2 present  ABD: Soft, non-tender, non-distended. Bowel sounds present  EXT and Back :- Tenderness at thoracic and lumbar spine area on palpation. Motor strength Left lower extremity 3/5 , sensation are normal to crude touch.  NEURO: AAOX3        LABS:                        9.2    13.30 )-----------( 101      ( 03 Oct 2018 06:49 )             30.3     10-03    134<L>  |  93<L>  |  47<H>  ----------------------------<  247<H>  4.7   |  24  |  1.2    Ca    8.6      03 Oct 2018 06:49  Phos  4.6     10-03  Mg     2.0     10-03                        RADIOLOGY:      1.  T10: Acute/subacute inferior endplate compression fracture of about   20%. 3 mm osseous retropulsion without cord compression.    2.  Chronic compression fractures of T4 (20%), T11 (50% with 3 mm osseous   retropulsion) and T12 (25% with 3 mm osseous retropulsion).    3.  Mild degenerative changes.

## 2018-10-03 NOTE — CONSULT NOTE ADULT - ASSESSMENT
67 yo female patient with PMHx HTN, DM II, CAD/CABG x2 (last in 2013) and PCI to graft 2 yrs ago, DL, RA on chronic prednisone 15mg daily for 30 years complicated by osteoporosis and vertebral fractures s/p kyphoplasty 3 months ago, anemia, presenting because of worsening mid back pain and abdominal pain over the last 4 days. MRI showed T10 acute Fx  and planned for Kyphoplasty tomorrow. cardiololgy called for pre op risk stratification.  pt has poor functional capacity , is able to walk 2 block with walker after which she gets sob and back pain for which she has to stop to catch her breath.  pt has been hospitalized a year ago for chest pain and weakness in [place] , treated medically ( no intervention).  no Hx of CHF exacerbation , or stroke or MI or valvular heart disease.  RCRI :2    CAD s/p CABG and PCI  DM type   HTN  DL  RA on prednisone  acute T10 fracture  DVT on therapeutic lovenox3  thrombocytopenia/anemia    given her risk factor and limited functional capacity , pt is at high risk for vanessa operative cardiac event for an intermediate risk surgery  she has no active cardiac decompensation  continue with ASA/lasix/statin/losartan  perioperatively , ok to hold plavix 69 yo female patient with PMHx HTN, DM II, CAD/CABG x2 (last in 2013) and PCI to graft 2 yrs ago, DL, RA on chronic prednisone 15mg daily for 30 years complicated by osteoporosis and vertebral fractures s/p kyphoplasty 3 months ago, anemia, presenting because of worsening mid back pain and abdominal pain over the last 4 days. MRI showed T10 acute Fx  and planned for Kyphoplasty tomorrow. cardiololgy called for pre op risk stratification.  pt has poor functional capacity , is able to walk 2 block with walker after which she gets sob and back pain for which she has to stop to catch her breath.  pt has been hospitalized a year ago for chest pain and weakness in [place] , treated medically ( no intervention).  no Hx of CHF exacerbation , or stroke or MI or valvular heart disease.  RCRI :2    CAD s/p CABG and PCI  DM type   HTN  DL  RA on prednisone  acute T10 fracture  DVT on therapeutic lovenox3  thrombocytopenia/anemia    given her risk factor and limited functional capacity , pt is at high risk for vanessa operative cardiac event for an intermediate risk surgery  she has no active cardiac decompensation  continue with ASA/lasix/statin/losartan  perioperatively , ok to hold plavix  can start low dose metoprolol 25mg bid

## 2018-10-03 NOTE — PROGRESS NOTE ADULT - ATTENDING COMMENTS
Patient seen and examined independently earlier today. Case discussed with housestaff, nursing, social work, patient. I agree with most of the resident's note, physical exam, and plan except as below. Patient still with mid back pain. +BMs.  D/w Dr. Alvarez (NS) plan is for OR in am if pt/family consent. If pt/family consent, would hold pm and am Lovenox. Pt agreed to proced with surgery to me but reportedly brother was hesitant. NS to speak to pt/brother again. Pt is intermed-high risk for low risk surgery (kyphoplasty). Patient seen and examined independently earlier today. Case discussed with housestaff, nursing, social work, patient. I agree with most of the resident's note, physical exam, and plan except as below. Patient still with mid back pain. +BMs.  D/w Dr. Alvarez (NS) plan is for OR in am if pt/family consent. If pt/family consent, would hold pm and am Lovenox. Pt agreed to proceed with surgery to me but reportedly brother was hesitant. NS to speak to pt/brother again. Pt is intermed-high risk for low risk surgery (kyphoplasty). Denies recent CP, SOB.

## 2018-10-03 NOTE — CONSULT NOTE ADULT - ATTENDING COMMENTS
Patient seen and examined agree with above except as noted.  Patient with back pain found to have thoracic compression fracture.  When lying up pain is better unable to lie flat.    exam shows decreased sensation in left extremities to temp, vib, LT including the face  power 5/5  FTN NL  Gait deferred    A/P  Patient with thoracic compression deformity with pain from back to below ribs.  Has some hemisensory findings suspicious for possible old lacunar infarct  1. CTH non contrast  2. Pain control  3. Neurosurgery  4. Heme/onc  5. Call with questions
I was physically present for the key portions of the evaluation and management (E/M) service provided.  I agree with the above history, physical, and plan which I have reviewed and edited where appropriate.
Pt seen and examined.   agree with fellow's A/P

## 2018-10-03 NOTE — CONSULT NOTE ADULT - REASON FOR ADMISSION
mid back pain of 4 days

## 2018-10-03 NOTE — PROGRESS NOTE ADULT - ASSESSMENT
#New compression fracture T11 and 12  - CT Abdomen and Pelvis w/ IV Cont (09.27.18 @ 00:32) New T11 and T12 moderate compression deformities. Unchanged moderate compression deformities of L3, L4 post kyphoplasty of L4.  - Neurosurgery eval: get MR (LS) under anesthesia (discussed w/ anesthesia, will be done this week)  - pain control for back pain: tramadol prn tid, and morphine 2mg q3h prn (pain not adequately controlled- increased frequency from q4 to q3)  - MRI was performed today , discussed with neurosurgery , pt will undergo procedure tomorrow   - NPO overnight    #Acute DVT left femoral  - C/w lovenox SQ BD  - Patient could net get VQ scan due to pain while lying back , not tachycardic, O2 >95 on RA, get VQ scan once pain under control echo    #) Proximal Muscle weakness  - recommend rheumatology eval with Dr. Belcher (pt previously seen by their group)  - check aldolase, TSH, thyroglobulin, TPO Ab, T3, T4, ACTH, aldosterone, NT5C1A (anti-CN-1a) ab, Louise-1 ab, HMG CoA reductase ab, SRP ab, ESR, CRP, NIEVES, ANCA, SSA, SSB, B2 glycoprotein, MuSK ab, LRP4 ab, AchR ab, anti-Mi2 ab, ACE level, lyme wb  - consider starting azathioprine 25 mg QD if ok with rheumatology  - neurosurgery f/u for thoracic compression fracture  - outpt EMG/NCS  - PT/OT  - check echo if not done already      #Abd pain - probably referred pain  - c/w pain meds  - colace, senna for constipation  -Will add lactulose for no BM    # troponemia:  - patient denies angina or shortness of breath  - d/w cardiac fellow, no intervention in setting of asymptomatic troponemia, possibly demand 2/2 anemia  - f/u third set CE negative     #RA  - c/w prednisone  - out pt F/u with rheumatology    #Thrombocytopenia + Microcytic anemia  - H/o hemorrhoids S/p hemorrhoidectomy  --Microcytic anemia Secondary to thalassemia minor  --Continue to monitor hemoglobin.   hematology eval:Probably related to consumption from DVT d/t acuity of drop in counts.    --Repeat CBC, if the counts are persistently low, will do a complete w/u including BM biopsy  -will continue to monitor the counts     DVT PPX :Pt already on Lovenox for DVT in left femoral  -Plavix changed to ASA.

## 2018-10-03 NOTE — PRE-ANESTHESIA EVALUATION ADULT - NSANTHOSAYNRD_GEN_A_CORE
No. DIDIER screening performed.  STOP BANG Legend: 0-2 = LOW Risk; 3-4 = INTERMEDIATE Risk; 5-8 = HIGH Risk

## 2018-10-03 NOTE — PRE-ANESTHESIA EVALUATION ADULT - RESPIRATORY RATE (BREATHS/MIN)
[facility]  Pulmonary, Critical Care, and Sleep Medicine    Name: Lynn Gamez MRN: [id number]   : 1946 Hospital: [facility]   Date: 2018        IMPRESSION:   · Wheezing- ? Reactive airways vs mild CHF. Low probability for PE  · Rapid atrial fibrillation - on cardizem and heparin  · Abdominal pain, Nausea and Vomiting- ? Embolic vs postop ileus. · EMMA - admission creatinine 3.40 (Baseline per chart review 1.3-1.6)- gentle hydration. 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 Underwent mitral valve repair with [place] chordae and a 32 mm mitral ring annuloplasty at [facility]      Patient Active Problem List   Diagnosis Code    Rapid atrial fibrillation (HCC) I48.91    Knee pain M25.569    Atrial fibrillation with rapid ventricular response (HCC) I48.91    Acute kidney injury ([facility] [address] N17.9      PLAN:   Resp: Supplemental O2 via NC, titrate flow for goal SPO2> 90%, pulmonary hygiene care, Aspiration precautions, Keep HOB >30 degrees. Will add bronchodilators and Pulmicort nebulized, short course of prednisone for wheezing  ID: No suspicion for infection at this time, continue to monitor  CVS: Continue cardizem and heparin gtt. Cardiology managing  Renal: Monitor I&Os, Trend Renal indices  Will follow till bronchospasm further improved. Subjective/Interval History:     18   Feels much better this am.  No c/o cough, SOB  + wheezing  Denies any further nausea/vomiting. Currently on IV heparin and Cardizem drip. HPI:  Patient is a 70 y. o. male with a history of CHF, AFib, HTN, TIA in  with no residual weakness, and Gout. Patient presented to the ED via EMS with shortness of breath, nausea, and dizziness. Patient recently had a Right total knee replacement done at [facility][1].  Was followed up with a
home visit where he was found to be tachycardic and had an elevated blood pressure. Started on Cardizem and then Amiodorone gtt, heparin. ROS:Pertinent items are noted in HPI. Objective:   Vital Signs:    Visit Vitals    BP (!) 148/91 (BP 1 Location: Right arm, BP Patient Position: At rest)  Comment: Nurse Nikky Gaming was notified    Pulse (!) 112    Temp 98.3 °F (36.8 °C)    Resp 20    Ht 5' 11\" (1.803 m)    Wt 99.7 kg (219 lb 14.4 oz)    SpO2 97%    BMI 30.67 kg/m2       O2 Device: Nasal cannula   O2 Flow Rate (L/min): 3 l/min   Temp (24hrs), Av °F (36.7 °C), Min:96 °F (35.6 °C), Max:98.6 °F (37 °C)       Intake/Output:   Last shift:         Last 3 shifts:  1901 -  0700  In: 2103.4 [P.O.:320; I.V.:1783.4]  Out: 1300 [Urine:1300]    Intake/Output Summary (Last 24 hours) at 18 0755  Last data filed at 18 0531   Gross per 24 hour   Intake           1840.4 ml   Output             1300 ml   Net            540.4 ml        Physical Exam:    General: in no apparent distress, well developed and well nourished, non-toxic, in no respiratory distress and acyanotic, alert and oriented times 3   HEENT: Normal   Neck: No abnormally enlarged lymph nodes.    Chest: normal   Lungs: end expiratory wheeze- bilaterally   Heart: irregular rhythm   Abdomen: abdomen is soft without significant tenderness, masses, organomegaly or guarding   Extremity: negative, surgical site-clean   Neuro: alert   Skin: Skin color, texture, turgor normal. No rashes or lesions        DATA:  Labs:  Recent Labs      18   0250  18   0547  18   WBC  7.8  7.9  8.2   HGB  9.7*  10.1*  11.3*   HCT  29.2*  30.2*  33.6*   PLT  164  169  196     Recent Labs      18   0250  18   0547  04/18/18   1949   NA  137  137  133*   K  4.2  4.3  4.2   CL  107  105  98*   CO2  23  23  26   GLU  108*  129*  150*   BUN  32*  51*  51*   CREA  1.89*  2.62*  3.40*   CA  8.4*  7.7*  8.9   MG  1.9   --    --    PHOS
--   3.2   --    INR  1.7*  1.7*  1.6*     No results for input(s): PH, PCO2, PO2, HCO3, FIO2 in the last 72 hours. Echo:pending    Imaging:  [x]I have personally reviewed the patients radiographs    V/Q scan Lun2018:   FINDINGS: The ventilation radiotracer distribution is unremarkable. The  perfusion radiotracer distribution is unremarkable. No evidence of mismatch. IMPRESSION:  1. Low probability ventilation perfusion scan. CXR Results  (Last 48 hours)               18 0600  XR CHEST PORT Final result    Impression:  IMPRESSION:       No interval change. Stable cardiomegaly. .   Persistent subtle right perihilar hazy opacity suggestive of subtle pneumonia       Narrative:  CHEST ONE VIEW portable 0600 hours       CPT CODE: 28945       HISTORY: Tachycardia with atrial fibrillation, associated with shortness of   breath, nausea, and dizziness, recent right total knee replacement. COMPARISON: Chest x-ray 2018. FINDINGS:        Single view obtained. The cardiac silhouette is mild to moderately enlarged. Sternal cerclage wires and cardiac valve replacement demonstrated. There is   tortuosity of the aorta with calcified plaque. The lungs are adequately   inflated. There is vague linear and streaky increased opacity in the right   perihilar region similar to the previous exam. Pulmonary vascularity is normal.   The costophrenic angles are sharply defined. No bony abnormalities are seen. 18 2045  XR ABD ACUTE W 1 V CHEST Final result    Impression:  IMPRESSION:       1. No acute abdominal findings.       -Enlarged chronic silhouette with suspected perihilar congestion versus   infiltrate. See above. Narrative:  EXAMINATION: Abdomen 2 views, chest single view       INDICATION: Vomiting       COMPARISON: None       FINDINGS:       Frontal view of chest obtained.  Post median sternotomy with prosthetic
cardiac   valve noted. Moderately enlarged cardiac silhouette. Prominent perihilar   interstitium. No evidence of pneumothorax. Frontal supine and upright views of the abdomen obtained. Nonobstructive bowel   gas pattern. No obvious free air. Right upper quadrant surgical clips. No   suspicious calcifications identified. Overlying wires limits evaluation. High complexity decision making was performed during the evaluation of this patient at high risk for decompensation with multiple organ involvement     Above mentioned total time spent on reviewing the case/medical record/data/notes/EMR/patient examination/documentation/coordinating care with nurse/consultants, exclusive of procedures with complex decision making performed and > 50% time spent in face to face evaluation.     Clint Weaver MD
18
20

## 2018-10-04 ENCOUNTER — RESULT REVIEW (OUTPATIENT)
Age: 68
End: 2018-10-04

## 2018-10-04 LAB
ANION GAP SERPL CALC-SCNC: 15 MMOL/L — HIGH (ref 7–14)
BUN SERPL-MCNC: 52 MG/DL — HIGH (ref 10–20)
CALCIUM SERPL-MCNC: 8.2 MG/DL — LOW (ref 8.5–10.1)
CHLORIDE SERPL-SCNC: 94 MMOL/L — LOW (ref 98–110)
CO2 SERPL-SCNC: 21 MMOL/L — SIGNIFICANT CHANGE UP (ref 17–32)
CREAT SERPL-MCNC: 1.2 MG/DL — SIGNIFICANT CHANGE UP (ref 0.7–1.5)
GLUCOSE BLDC GLUCOMTR-MCNC: 113 MG/DL — HIGH (ref 70–99)
GLUCOSE BLDC GLUCOMTR-MCNC: 204 MG/DL — HIGH (ref 70–99)
GLUCOSE BLDC GLUCOMTR-MCNC: 243 MG/DL — HIGH (ref 70–99)
GLUCOSE BLDC GLUCOMTR-MCNC: 244 MG/DL — HIGH (ref 70–99)
GLUCOSE BLDC GLUCOMTR-MCNC: 259 MG/DL — HIGH (ref 70–99)
GLUCOSE BLDC GLUCOMTR-MCNC: 260 MG/DL — HIGH (ref 70–99)
GLUCOSE BLDC GLUCOMTR-MCNC: 400 MG/DL — HIGH (ref 70–99)
GLUCOSE SERPL-MCNC: 218 MG/DL — HIGH (ref 70–99)
HCT VFR BLD CALC: 28.1 % — LOW (ref 37–47)
HGB BLD-MCNC: 8.7 G/DL — LOW (ref 12–16)
MAGNESIUM SERPL-MCNC: 2 MG/DL — SIGNIFICANT CHANGE UP (ref 1.8–2.4)
MCHC RBC-ENTMCNC: 19 PG — LOW (ref 27–31)
MCHC RBC-ENTMCNC: 31 G/DL — LOW (ref 32–37)
MCV RBC AUTO: 61.2 FL — LOW (ref 81–99)
NRBC # BLD: 0 /100 WBCS — SIGNIFICANT CHANGE UP (ref 0–0)
PHOSPHATE SERPL-MCNC: 4.2 MG/DL — SIGNIFICANT CHANGE UP (ref 2.1–4.9)
PLATELET # BLD AUTO: 102 K/UL — LOW (ref 130–400)
POTASSIUM SERPL-MCNC: 4.3 MMOL/L — SIGNIFICANT CHANGE UP (ref 3.5–5)
POTASSIUM SERPL-SCNC: 4.3 MMOL/L — SIGNIFICANT CHANGE UP (ref 3.5–5)
RBC # BLD: 4.59 M/UL — SIGNIFICANT CHANGE UP (ref 4.2–5.4)
RBC # FLD: 16.4 % — HIGH (ref 11.5–14.5)
SODIUM SERPL-SCNC: 130 MMOL/L — LOW (ref 135–146)
THYROGLOB AB FLD IA-ACNC: <20 IU/ML — SIGNIFICANT CHANGE UP (ref 0–40)
THYROGLOB AB SERPL-ACNC: <20 IU/ML — SIGNIFICANT CHANGE UP (ref 0–40)
THYROPEROXIDASE AB SERPL-ACNC: <10 IU/ML — SIGNIFICANT CHANGE UP (ref 0–34.9)
WBC # BLD: 12.6 K/UL — HIGH (ref 4.8–10.8)
WBC # FLD AUTO: 12.6 K/UL — HIGH (ref 4.8–10.8)

## 2018-10-04 PROCEDURE — 22513 PERQ VERTEBRAL AUGMENTATION: CPT

## 2018-10-04 RX ORDER — ONDANSETRON 8 MG/1
4 TABLET, FILM COATED ORAL ONCE
Qty: 0 | Refills: 0 | Status: COMPLETED | OUTPATIENT
Start: 2018-10-04 | End: 2018-10-04

## 2018-10-04 RX ORDER — INSULIN LISPRO 100/ML
5 VIAL (ML) SUBCUTANEOUS ONCE
Qty: 0 | Refills: 0 | Status: COMPLETED | OUTPATIENT
Start: 2018-10-04 | End: 2018-10-04

## 2018-10-04 RX ORDER — DEXTROSE 50 % IN WATER 50 %
25 SYRINGE (ML) INTRAVENOUS ONCE
Qty: 0 | Refills: 0 | Status: DISCONTINUED | OUTPATIENT
Start: 2018-10-04 | End: 2018-10-05

## 2018-10-04 RX ORDER — INSULIN LISPRO 100/ML
3 VIAL (ML) SUBCUTANEOUS ONCE
Qty: 0 | Refills: 0 | Status: DISCONTINUED | OUTPATIENT
Start: 2018-10-04 | End: 2018-10-05

## 2018-10-04 RX ORDER — ACETAMINOPHEN 500 MG
650 TABLET ORAL EVERY 6 HOURS
Qty: 0 | Refills: 0 | Status: DISCONTINUED | OUTPATIENT
Start: 2018-10-04 | End: 2018-10-04

## 2018-10-04 RX ORDER — ATORVASTATIN CALCIUM 80 MG/1
40 TABLET, FILM COATED ORAL AT BEDTIME
Qty: 0 | Refills: 0 | Status: DISCONTINUED | OUTPATIENT
Start: 2018-10-04 | End: 2018-10-06

## 2018-10-04 RX ORDER — MORPHINE SULFATE 50 MG/1
4 CAPSULE, EXTENDED RELEASE ORAL ONCE
Qty: 0 | Refills: 0 | Status: DISCONTINUED | OUTPATIENT
Start: 2018-10-04 | End: 2018-10-04

## 2018-10-04 RX ORDER — PANTOPRAZOLE SODIUM 20 MG/1
40 TABLET, DELAYED RELEASE ORAL
Qty: 0 | Refills: 0 | Status: DISCONTINUED | OUTPATIENT
Start: 2018-10-04 | End: 2018-10-06

## 2018-10-04 RX ORDER — LACTULOSE 10 G/15ML
20 SOLUTION ORAL EVERY 12 HOURS
Qty: 0 | Refills: 0 | Status: DISCONTINUED | OUTPATIENT
Start: 2018-10-04 | End: 2018-10-06

## 2018-10-04 RX ORDER — LOSARTAN POTASSIUM 100 MG/1
50 TABLET, FILM COATED ORAL DAILY
Qty: 0 | Refills: 0 | Status: DISCONTINUED | OUTPATIENT
Start: 2018-10-04 | End: 2018-10-06

## 2018-10-04 RX ORDER — POLYETHYLENE GLYCOL 3350 17 G/17G
17 POWDER, FOR SOLUTION ORAL EVERY 12 HOURS
Qty: 0 | Refills: 0 | Status: DISCONTINUED | OUTPATIENT
Start: 2018-10-04 | End: 2018-10-06

## 2018-10-04 RX ORDER — INSULIN LISPRO 100/ML
7 VIAL (ML) SUBCUTANEOUS
Qty: 0 | Refills: 0 | Status: DISCONTINUED | OUTPATIENT
Start: 2018-10-04 | End: 2018-10-05

## 2018-10-04 RX ORDER — GLUCAGON INJECTION, SOLUTION 0.5 MG/.1ML
1 INJECTION, SOLUTION SUBCUTANEOUS ONCE
Qty: 0 | Refills: 0 | Status: DISCONTINUED | OUTPATIENT
Start: 2018-10-04 | End: 2018-10-05

## 2018-10-04 RX ORDER — ENOXAPARIN SODIUM 100 MG/ML
60 INJECTION SUBCUTANEOUS EVERY 12 HOURS
Qty: 0 | Refills: 0 | Status: DISCONTINUED | OUTPATIENT
Start: 2018-10-04 | End: 2018-10-05

## 2018-10-04 RX ORDER — DEXTROSE 50 % IN WATER 50 %
15 SYRINGE (ML) INTRAVENOUS ONCE
Qty: 0 | Refills: 0 | Status: DISCONTINUED | OUTPATIENT
Start: 2018-10-04 | End: 2018-10-05

## 2018-10-04 RX ORDER — OXYCODONE AND ACETAMINOPHEN 5; 325 MG/1; MG/1
1 TABLET ORAL EVERY 6 HOURS
Qty: 0 | Refills: 0 | Status: DISCONTINUED | OUTPATIENT
Start: 2018-10-04 | End: 2018-10-06

## 2018-10-04 RX ORDER — CHOLECALCIFEROL (VITAMIN D3) 125 MCG
2000 CAPSULE ORAL DAILY
Qty: 0 | Refills: 0 | Status: DISCONTINUED | OUTPATIENT
Start: 2018-10-04 | End: 2018-10-06

## 2018-10-04 RX ORDER — SODIUM CHLORIDE 9 MG/ML
3 INJECTION INTRAMUSCULAR; INTRAVENOUS; SUBCUTANEOUS EVERY 8 HOURS
Qty: 0 | Refills: 0 | Status: DISCONTINUED | OUTPATIENT
Start: 2018-10-04 | End: 2018-10-06

## 2018-10-04 RX ORDER — INSULIN GLARGINE 100 [IU]/ML
25 INJECTION, SOLUTION SUBCUTANEOUS
Qty: 0 | Refills: 0 | Status: DISCONTINUED | OUTPATIENT
Start: 2018-10-04 | End: 2018-10-05

## 2018-10-04 RX ORDER — GABAPENTIN 400 MG/1
400 CAPSULE ORAL THREE TIMES A DAY
Qty: 0 | Refills: 0 | Status: DISCONTINUED | OUTPATIENT
Start: 2018-10-04 | End: 2018-10-06

## 2018-10-04 RX ORDER — ONDANSETRON 8 MG/1
4 TABLET, FILM COATED ORAL ONCE
Qty: 0 | Refills: 0 | Status: DISCONTINUED | OUTPATIENT
Start: 2018-10-04 | End: 2018-10-06

## 2018-10-04 RX ORDER — ACETAMINOPHEN 500 MG
650 TABLET ORAL EVERY 6 HOURS
Qty: 0 | Refills: 0 | Status: DISCONTINUED | OUTPATIENT
Start: 2018-10-04 | End: 2018-10-06

## 2018-10-04 RX ORDER — MORPHINE SULFATE 50 MG/1
2 CAPSULE, EXTENDED RELEASE ORAL
Qty: 0 | Refills: 0 | Status: DISCONTINUED | OUTPATIENT
Start: 2018-10-04 | End: 2018-10-05

## 2018-10-04 RX ORDER — TRAMADOL HYDROCHLORIDE 50 MG/1
50 TABLET ORAL THREE TIMES A DAY
Qty: 0 | Refills: 0 | Status: DISCONTINUED | OUTPATIENT
Start: 2018-10-04 | End: 2018-10-06

## 2018-10-04 RX ORDER — INSULIN LISPRO 100/ML
2 VIAL (ML) SUBCUTANEOUS ONCE
Qty: 0 | Refills: 0 | Status: DISCONTINUED | OUTPATIENT
Start: 2018-10-04 | End: 2018-10-05

## 2018-10-04 RX ORDER — DEXTROSE 50 % IN WATER 50 %
12.5 SYRINGE (ML) INTRAVENOUS ONCE
Qty: 0 | Refills: 0 | Status: DISCONTINUED | OUTPATIENT
Start: 2018-10-04 | End: 2018-10-05

## 2018-10-04 RX ORDER — CHOLECALCIFEROL (VITAMIN D3) 125 MCG
2000 CAPSULE ORAL DAILY
Qty: 0 | Refills: 0 | Status: DISCONTINUED | OUTPATIENT
Start: 2018-10-04 | End: 2018-10-04

## 2018-10-04 RX ORDER — HEPARIN SODIUM 5000 [USP'U]/ML
5000 INJECTION INTRAVENOUS; SUBCUTANEOUS EVERY 8 HOURS
Qty: 0 | Refills: 0 | Status: DISCONTINUED | OUTPATIENT
Start: 2018-10-04 | End: 2018-10-04

## 2018-10-04 RX ORDER — ASPIRIN/CALCIUM CARB/MAGNESIUM 324 MG
81 TABLET ORAL DAILY
Qty: 0 | Refills: 0 | Status: DISCONTINUED | OUTPATIENT
Start: 2018-10-04 | End: 2018-10-05

## 2018-10-04 RX ORDER — METOCLOPRAMIDE HCL 10 MG
10 TABLET ORAL ONCE
Qty: 0 | Refills: 0 | Status: DISCONTINUED | OUTPATIENT
Start: 2018-10-04 | End: 2018-10-06

## 2018-10-04 RX ORDER — INSULIN LISPRO 100/ML
2 VIAL (ML) SUBCUTANEOUS ONCE
Qty: 0 | Refills: 0 | Status: COMPLETED | OUTPATIENT
Start: 2018-10-04 | End: 2018-10-04

## 2018-10-04 RX ORDER — SODIUM CHLORIDE 9 MG/ML
1000 INJECTION, SOLUTION INTRAVENOUS
Qty: 0 | Refills: 0 | Status: DISCONTINUED | OUTPATIENT
Start: 2018-10-04 | End: 2018-10-04

## 2018-10-04 RX ORDER — SIMETHICONE 80 MG/1
80 TABLET, CHEWABLE ORAL
Qty: 0 | Refills: 0 | Status: DISCONTINUED | OUTPATIENT
Start: 2018-10-04 | End: 2018-10-06

## 2018-10-04 RX ORDER — SODIUM CHLORIDE 9 MG/ML
1000 INJECTION INTRAMUSCULAR; INTRAVENOUS; SUBCUTANEOUS
Qty: 0 | Refills: 0 | Status: DISCONTINUED | OUTPATIENT
Start: 2018-10-04 | End: 2018-10-04

## 2018-10-04 RX ORDER — INSULIN LISPRO 100/ML
VIAL (ML) SUBCUTANEOUS
Qty: 0 | Refills: 0 | Status: DISCONTINUED | OUTPATIENT
Start: 2018-10-04 | End: 2018-10-05

## 2018-10-04 RX ORDER — FUROSEMIDE 40 MG
40 TABLET ORAL DAILY
Qty: 0 | Refills: 0 | Status: DISCONTINUED | OUTPATIENT
Start: 2018-10-04 | End: 2018-10-06

## 2018-10-04 RX ORDER — ATORVASTATIN CALCIUM 80 MG/1
40 TABLET, FILM COATED ORAL AT BEDTIME
Qty: 0 | Refills: 0 | Status: DISCONTINUED | OUTPATIENT
Start: 2018-10-04 | End: 2018-10-04

## 2018-10-04 RX ADMIN — ONDANSETRON 4 MILLIGRAM(S): 8 TABLET, FILM COATED ORAL at 15:51

## 2018-10-04 RX ADMIN — Medication 81 MILLIGRAM(S): at 18:04

## 2018-10-04 RX ADMIN — ENOXAPARIN SODIUM 60 MILLIGRAM(S): 100 INJECTION SUBCUTANEOUS at 18:05

## 2018-10-04 RX ADMIN — Medication 5 MILLIGRAM(S): at 21:23

## 2018-10-04 RX ADMIN — Medication 40 MILLIGRAM(S): at 06:11

## 2018-10-04 RX ADMIN — MORPHINE SULFATE 2 MILLIGRAM(S): 50 CAPSULE, EXTENDED RELEASE ORAL at 18:10

## 2018-10-04 RX ADMIN — SODIUM CHLORIDE 60 MILLILITER(S): 9 INJECTION INTRAMUSCULAR; INTRAVENOUS; SUBCUTANEOUS at 10:43

## 2018-10-04 RX ADMIN — Medication 5 MILLIGRAM(S): at 06:12

## 2018-10-04 RX ADMIN — Medication 7 UNIT(S): at 18:03

## 2018-10-04 RX ADMIN — GABAPENTIN 400 MILLIGRAM(S): 400 CAPSULE ORAL at 21:24

## 2018-10-04 RX ADMIN — GABAPENTIN 400 MILLIGRAM(S): 400 CAPSULE ORAL at 06:11

## 2018-10-04 RX ADMIN — INSULIN GLARGINE 25 UNIT(S): 100 INJECTION, SOLUTION SUBCUTANEOUS at 22:24

## 2018-10-04 RX ADMIN — LOSARTAN POTASSIUM 50 MILLIGRAM(S): 100 TABLET, FILM COATED ORAL at 06:12

## 2018-10-04 RX ADMIN — OXYCODONE AND ACETAMINOPHEN 1 TABLET(S): 5; 325 TABLET ORAL at 20:46

## 2018-10-04 RX ADMIN — ATORVASTATIN CALCIUM 40 MILLIGRAM(S): 80 TABLET, FILM COATED ORAL at 21:23

## 2018-10-04 RX ADMIN — SODIUM CHLORIDE 3 MILLILITER(S): 9 INJECTION INTRAMUSCULAR; INTRAVENOUS; SUBCUTANEOUS at 21:24

## 2018-10-04 RX ADMIN — POLYETHYLENE GLYCOL 3350 17 GRAM(S): 17 POWDER, FOR SOLUTION ORAL at 18:07

## 2018-10-04 RX ADMIN — Medication 2 UNIT(S): at 01:15

## 2018-10-04 RX ADMIN — Medication 2000 UNIT(S): at 18:05

## 2018-10-04 RX ADMIN — Medication 5 UNIT(S): at 22:21

## 2018-10-04 RX ADMIN — PANTOPRAZOLE SODIUM 40 MILLIGRAM(S): 20 TABLET, DELAYED RELEASE ORAL at 06:12

## 2018-10-04 NOTE — BRIEF OPERATIVE NOTE - PROCEDURE
<<-----Click on this checkbox to enter Procedure Thoracic kyphoplasty  10/04/2018  T10, unilateral  Active  SMOTIVALA

## 2018-10-04 NOTE — PROGRESS NOTE ADULT - SUBJECTIVE AND OBJECTIVE BOX
SUBJECTIVE:    Patient is a 68y old Female who presents with a chief complaint of mid back pain of 4 days (03 Oct 2018 17:32)    Currently admitted to medicine with the primary diagnosis of Intractable pain     Today is hospital day 7d. Pt was NPO for the procedure today.      PAST MEDICAL & SURGICAL HISTORY  Anemia  Osteoporosis  Rheumatoid arthritis  Dyslipidemia  HTN (hypertension)  CAD (coronary artery disease)  Diabetes  History of hemorrhoidectomy    SOCIAL HISTORY:  Negative for smoking/alcohol/drug use.     ALLERGIES:  penicillin (Anaphylaxis; Angioedema)    MEDICATIONS:  STANDING MEDICATIONS  aspirin  chewable 81 milliGRAM(s) Oral daily  atorvastatin 40 milliGRAM(s) Oral at bedtime  cholecalciferol 2000 Unit(s) Oral daily  dextrose 5%. 1000 milliLiter(s) IV Continuous <Continuous>  dextrose 50% Injectable 12.5 Gram(s) IV Push once  dextrose 50% Injectable 25 Gram(s) IV Push once  dextrose 50% Injectable 25 Gram(s) IV Push once  furosemide    Tablet 40 milliGRAM(s) Oral daily  gabapentin 400 milliGRAM(s) Oral three times a day  HYDROmorphone  Injectable 1 milliGRAM(s) IV Push once  influenza   Vaccine 0.5 milliLiter(s) IntraMuscular once  insulin glargine Injectable (LANTUS) 25 Unit(s) SubCutaneous two times a day  insulin lispro (HumaLOG) corrective regimen sliding scale   SubCutaneous three times a day before meals  insulin lispro Injectable (HumaLOG) 7 Unit(s) SubCutaneous three times a day before meals  losartan 50 milliGRAM(s) Oral daily  pantoprazole    Tablet 40 milliGRAM(s) Oral before breakfast  polyethylene glycol 3350 17 Gram(s) Oral every 12 hours  predniSONE   Tablet 5 milliGRAM(s) Oral three times a day  sodium chloride 0.9%. 1000 milliLiter(s) IV Continuous <Continuous>    PRN MEDICATIONS  acetaminophen   Tablet .. 650 milliGRAM(s) Oral every 6 hours PRN  dextrose 40% Gel 15 Gram(s) Oral once PRN  glucagon  Injectable 1 milliGRAM(s) IntraMuscular once PRN  morphine  - Injectable 4 milliGRAM(s) IV Push every 3 hours PRN  simethicone 80 milliGRAM(s) Chew four times a day PRN  traMADol 50 milliGRAM(s) Oral three times a day PRN    VITALS:   T(F): 98.3  HR: 68  BP: 128/66  RR: 18  SpO2: 96%    PHYSICAL EXAM:  GEN: No acute distress  LUNGS: Clear to auscultation bilaterally   HEART: S1/S2 present  ABD: Soft, non-tender, non-distended. Bowel sounds present  EXT and Back :- Tenderness at thoracic and lumbar spine area on palpation. Motor strength Left lower extremity 3/5 , sensation are normal to crude touch.  NEURO: AAOX3      LABS:                        8.7    12.60 )-----------( 102      ( 04 Oct 2018 05:28 )             28.1     10-04    130<L>  |  94<L>  |  52<H>  ----------------------------<  218<H>  4.3   |  21  |  1.2    Ca    8.2<L>      04 Oct 2018 05:28  Phos  4.2     10-04  Mg     2.0     10-04      PT/INR - ( 03 Oct 2018 12:37 )   PT: 11.40 sec;   INR: 1.06 ratio         PTT - ( 03 Oct 2018 12:37 )  PTT:42.2 sec                  RADIOLOGY:        1.  T10: Acute/subacute inferior endplate compression fracture of about   20%. 3 mm osseous retropulsion without cord compression.    2.  Chronic compression fractures of T4 (20%), T11 (50% with 3 mm osseous   retropulsion) and T12 (25% with 3 mm osseous retropulsion).    3.  Mild degenerative changes.

## 2018-10-04 NOTE — BRIEF OPERATIVE NOTE - PRE-OP DX
Closed compression fracture of thoracic vertebra with delayed healing, subsequent encounter  10/04/2018  T10 compression fracture, osteoporotic  Active  Lukasz Alvarez

## 2018-10-04 NOTE — PROGRESS NOTE ADULT - SUBJECTIVE AND OBJECTIVE BOX
MALLIKA WILLSON  MRN-[id number]    HD#  8  POD# 0  S/P T12 kyphoplasty      Current issues:  68y Female  sitting in bed eating dinner.  reports mild back pain.  no worse.  no leg pain.      HPI:  67 yo female patient with PMHx HTN, DM II, CAD/CABG x2 (last in 2013), DL, RA on chronic prednisone 15mg daily for 30 years complicated by osteoporosis and vertebral fractures s/p kyphoplasty 3 months ago, anemia, presenting because of worsening mid back pain and abdominal pain over the last 4 days.  Patient reports mid back severe pain, started spontaneously 4 days ago, no back trauma or injury. Pain is sharp, poorly relieved by tylenol. Unable to lay flat, she says she's been sleeping sitting on a sofa. Later on, she started experiencing abdominal pain, colicky, mainly at the RUQ, moderate in intensity, non radiating, not related to food intake, no nausea or vomiting or diarrhea associated, no fever or chills. No other complaints.  Note that patient has had decreased physical activity it's been around 9 months since she had the vertebral fractures. She spends most of her time in the sofa. (27 Sep 2018 09:59)      Allergies    penicillin (Anaphylaxis; Angioedema)    Intolerances      MEDICATIONS  (STANDING):  aspirin  chewable 81 milliGRAM(s) Oral daily  atorvastatin 40 milliGRAM(s) Oral at bedtime  cholecalciferol 2000 Unit(s) Oral daily  dextrose 50% Injectable 12.5 Gram(s) IV Push once  dextrose 50% Injectable 25 Gram(s) IV Push once  dextrose 50% Injectable 25 Gram(s) IV Push once  enoxaparin Injectable 60 milliGRAM(s) SubCutaneous every 12 hours  furosemide    Tablet 40 milliGRAM(s) Oral daily  gabapentin 400 milliGRAM(s) Oral three times a day  influenza   Vaccine 0.5 milliLiter(s) IntraMuscular once  insulin glargine Injectable (LANTUS) 25 Unit(s) SubCutaneous two times a day  insulin lispro (HumaLOG) corrective regimen sliding scale   SubCutaneous three times a day before meals  insulin lispro Injectable (HumaLOG) 7 Unit(s) SubCutaneous three times a day before meals  insulin lispro Injectable (HumaLOG) 3 Unit(s) SubCutaneous once  insulin lispro Injectable (HumaLOG) 2 Unit(s) SubCutaneous once  lactulose Syrup 20 Gram(s) Oral every 12 hours  losartan 50 milliGRAM(s) Oral daily  metoclopramide Injectable 10 milliGRAM(s) IV Push once  ondansetron Injectable 4 milliGRAM(s) IV Push once  pantoprazole    Tablet 40 milliGRAM(s) Oral before breakfast  polyethylene glycol 3350 17 Gram(s) Oral every 12 hours  predniSONE   Tablet 5 milliGRAM(s) Oral three times a day  sodium chloride 0.9% lock flush 3 milliLiter(s) IV Push every 8 hours    MEDICATIONS  (PRN):  acetaminophen   Tablet .. 650 milliGRAM(s) Oral every 6 hours PRN Mild Pain (1 - 3)  dextrose 40% Gel 15 Gram(s) Oral once PRN Blood Glucose LESS THAN 70 milliGRAM(s)/deciliter  glucagon  Injectable 1 milliGRAM(s) IntraMuscular once PRN Glucose LESS THAN 70 milligrams/deciliter  morphine  - Injectable 2 milliGRAM(s) IV Push every 3 hours PRN Severe Pain (7 - 10)  oxyCODONE    5 mG/acetaminophen 325 mG 1 Tablet(s) Oral every 6 hours PRN Moderate Pain (4 - 6)  simethicone 80 milliGRAM(s) Chew four times a day PRN Gas  traMADol 50 milliGRAM(s) Oral three times a day PRN Moderate Pain (4 - 6)    Vital Signs Last 24 Hrs  T(C): 35.8 (04 Oct 2018 16:36), Max: 36.8 (03 Oct 2018 23:56)  T(F): 96.5 (04 Oct 2018 16:36), Max: 98.3 (04 Oct 2018 06:22)  HR: 78 (04 Oct 2018 16:36) (66 - 81)  BP: 158/76 (04 Oct 2018 16:36) (109/61 - 165/82)  RR: 19 (04 Oct 2018 16:36) (16 - 20)  SpO2: 97% (04 Oct 2018 16:10) (96% - 100%)    I&O's Detail    10-04    130<L>  |  94<L>  |  52<H>  ----------------------------<  218<H>  4.3   |  21  |  1.2    Ca    8.2<L>      04 Oct 2018 05:28  Phos  4.2     10-04  Mg     2.0     10-04                            8.7    12.60 )-----------( 102      ( 04 Oct 2018 05:28 )             28.1           Exam:    back dressing dry  LE mobile good sensory    Plan:  stable post op  OOB PT Rehab  brace for comfort if needed.                Home Medications:  atorvastatin 40 mg oral tablet: 1 tab(s) orally once a day (at bedtime) (27 Sep 2018 10:09)  gabapentin 400 mg oral tablet: 1 tab(s) orally 2 times a day (27 Sep 2018 10:08)  isosorbide mononitrate 10 mg oral tablet: 1 tab(s) orally once a day (27 Sep 2018 10:09)  Januvia 100 mg oral tablet: 1 tab(s) orally once a day (27 Sep 2018 10:11)  Lasix 20 mg oral tablet: 1 tab(s) orally once a day, As Needed (27 Sep 2018 10:07)  Levemir FlexPen 100 units/mL subcutaneous solution: 30 unit(s) subcutaneous 2 times a day (27 Sep 2018 10:11)  losartan 50 mg oral tablet: 1 tab(s) orally once a day (27 Sep 2018 10:11)  metFORMIN 500 mg oral tablet: 2 tab(s) orally 3 times a day (2 in AM, 2 at noon, 1 at night) (27 Sep 2018 10:08)  omeprazole 20 mg oral delayed release capsule: 1 cap(s) orally once a day (27 Sep 2018 10:09)  Plavix 75 mg oral tablet: 1 tab(s) orally once a day (27 Sep 2018 10:10)  predniSONE 5 mg oral tablet: 1 tab(s) orally 3 times a day (27 Sep 2018 10:10)  raNITIdine 150 mg oral tablet: 1 tab(s) orally 2 times a day (27 Sep 2018 10:10)    PAST MEDICAL & SURGICAL HISTORY:  Anemia  Osteoporosis  Rheumatoid arthritis  Dyslipidemia  HTN (hypertension)  CAD (coronary artery disease)  Diabetes  History of hemorrhoidectomy

## 2018-10-04 NOTE — PROGRESS NOTE ADULT - ASSESSMENT
#New compression fracture T11 and 12  - CT Abdomen and Pelvis w/ IV Cont (09.27.18 @ 00:32) New T11 and T12 moderate compression deformities. Unchanged moderate compression deformities of L3, L4 post kyphoplasty of L4.  - Neurosurgery eval: get MR (LS) under anesthesia (discussed w/ anesthesia, will be done this week)  - pain control for back pain: tramadol prn tid, and morphine 2mg q3h prn (pain not adequately controlled- increased frequency from q4 to q3)  - MRI was performed today , discussed with neurosurgery   - Pt was NPO overnight for Kyphoplasty today.  -Will NEED PT/OT after procedure    #Acute DVT left femoral  - C/w lovenox SQ BD  - Patient could net get VQ scan due to pain while lying back , not tachycardic, O2 >95 on RA, get VQ scan once pain under control echo    #) Proximal Muscle weakness  - recommend rheumatology eval with Dr. Belcher (pt previously seen by their group)  - check aldolase, TSH, thyroglobulin, TPO Ab, T3, T4, ACTH, aldosterone, NT5C1A (anti-CN-1a) ab, Louise-1 ab, HMG CoA reductase ab, SRP ab, ESR, CRP, NIEVES, ANCA, SSA, SSB, B2 glycoprotein, MuSK ab, LRP4 ab, AchR ab, anti-Mi2 ab, ACE level, lyme wb  - consider starting azathioprine 25 mg QD if ok with rheumatology  - neurosurgery f/u for thoracic compression fracture  - outpt EMG/NCS  - PT/OT        #Abd pain - probably referred pain  - c/w pain meds  - colace, senna for constipation  -Will add lactulose for no BM    # troponemia:  - patient denies angina or shortness of breath  - d/w cardiac fellow, no intervention in setting of asymptomatic troponemia, possibly demand 2/2 anemia  - f/u third set CE negative     #RA  - c/w prednisone  - out pt F/u with rheumatology    #Thrombocytopenia + Microcytic anemia  - H/o hemorrhoids S/p hemorrhoidectomy  --Microcytic anemia Secondary to thalassemia minor  --Continue to monitor hemoglobin.   hematology eval:Probably related to consumption from DVT d/t acuity of drop in counts.    --Repeat CBC, if the counts are persistently low, will do a complete w/u including BM biopsy  -will continue to monitor the counts     DVT PPX :Pt already on Lovenox for DVT in left femoral  -Plavix changed to ASA.

## 2018-10-04 NOTE — PROGRESS NOTE ADULT - ATTENDING COMMENTS
Patient seen and examined independently earlier today. Case discussed with housestaff, nursing, social work, patient, daughter in law, Dr. Alvarez. I agree with most of the resident's note, physical exam, and plan except as below. Patient denied CP, SOB. +BMs. Still with back pain. Going to OR today for kyphoplasty. Careful monitoring in PACU postop. Restart therapeutic Lovenox once ok with NS.      Addendum: D/w Dr. Alvarez - can restart Lovenox tonight.

## 2018-10-04 NOTE — BRIEF OPERATIVE NOTE - POST-OP DX
Closed compression fracture of thoracic vertebra with delayed healing, subsequent encounter  10/04/2018  T10 compression fracture, acute/subacute, osteoporotic  Active  Lukasz Alvarez

## 2018-10-04 NOTE — PROGRESS NOTE ADULT - SUBJECTIVE AND OBJECTIVE BOX
Spoke with patient and her family. Given her contunued back pain in presence of acute fracture despite narcotics, PT in the hospital will proceed with kyphoplasty. Risk and benefits discussed including but not limited to bleeding, infection, nerve damage, spinal cord injury, lack of relief, migration of cement. All questions answered. Patient  and family wish to proceed.

## 2018-10-05 ENCOUNTER — TRANSCRIPTION ENCOUNTER (OUTPATIENT)
Age: 68
End: 2018-10-05

## 2018-10-05 LAB
ACTH SER-ACNC: <5 PG/ML — SIGNIFICANT CHANGE UP (ref 5–46)
ALDOST SERPL-MCNC: 33.6 NG/DL — HIGH
ANA TITR SER: NEGATIVE — SIGNIFICANT CHANGE UP
ANION GAP SERPL CALC-SCNC: 16 MMOL/L — HIGH (ref 7–14)
B2 GLYCOPROT1 AB SER QL: NEGATIVE — SIGNIFICANT CHANGE UP
BUN SERPL-MCNC: 45 MG/DL — HIGH (ref 10–20)
CALCIUM SERPL-MCNC: 8.6 MG/DL — SIGNIFICANT CHANGE UP (ref 8.5–10.1)
CHLORIDE SERPL-SCNC: 96 MMOL/L — LOW (ref 98–110)
CO2 SERPL-SCNC: 23 MMOL/L — SIGNIFICANT CHANGE UP (ref 17–32)
CREAT SERPL-MCNC: 1.3 MG/DL — SIGNIFICANT CHANGE UP (ref 0.7–1.5)
ENA JO1 AB SER-ACNC: <0.2 AI — SIGNIFICANT CHANGE UP
GIANT PLATELETS BLD QL SMEAR: PRESENT — SIGNIFICANT CHANGE UP
GLUCOSE BLDC GLUCOMTR-MCNC: 179 MG/DL — HIGH (ref 70–99)
GLUCOSE BLDC GLUCOMTR-MCNC: 223 MG/DL — HIGH (ref 70–99)
GLUCOSE BLDC GLUCOMTR-MCNC: 249 MG/DL — HIGH (ref 70–99)
GLUCOSE BLDC GLUCOMTR-MCNC: 343 MG/DL — HIGH (ref 70–99)
GLUCOSE BLDC GLUCOMTR-MCNC: 359 MG/DL — HIGH (ref 70–99)
GLUCOSE BLDC GLUCOMTR-MCNC: 407 MG/DL — HIGH (ref 70–99)
GLUCOSE BLDC GLUCOMTR-MCNC: 455 MG/DL — CRITICAL HIGH (ref 70–99)
GLUCOSE SERPL-MCNC: 189 MG/DL — HIGH (ref 70–99)
HCT VFR BLD CALC: 29.7 % — LOW (ref 37–47)
HGB BLD-MCNC: 8.9 G/DL — LOW (ref 12–16)
MCHC RBC-ENTMCNC: 18.5 PG — LOW (ref 27–31)
MCHC RBC-ENTMCNC: 30 G/DL — LOW (ref 32–37)
MCV RBC AUTO: 61.6 FL — LOW (ref 81–99)
METAMYELOCYTES # FLD: 1.7 % — HIGH (ref 0–0)
MYELOCYTES NFR BLD: 1.7 % — HIGH (ref 0–0)
NEUTS BAND # BLD: 0.9 % — SIGNIFICANT CHANGE UP (ref 0–6)
NRBC # BLD: 3 /100 — HIGH (ref 0–0)
NRBC # BLD: SIGNIFICANT CHANGE UP /100 WBCS (ref 0–0)
PLAT MORPH BLD: ABNORMAL
PLATELET # BLD AUTO: 85 K/UL — LOW (ref 130–400)
POTASSIUM SERPL-MCNC: 4.9 MMOL/L — SIGNIFICANT CHANGE UP (ref 3.5–5)
POTASSIUM SERPL-SCNC: 4.9 MMOL/L — SIGNIFICANT CHANGE UP (ref 3.5–5)
RBC # BLD: 4.82 M/UL — SIGNIFICANT CHANGE UP (ref 4.2–5.4)
RBC # FLD: 16.4 % — HIGH (ref 11.5–14.5)
RBC BLD AUTO: ABNORMAL
SMOOTH MUSCLE AB SER-ACNC: ABNORMAL
SODIUM SERPL-SCNC: 135 MMOL/L — SIGNIFICANT CHANGE UP (ref 135–146)
T3 SERPL-MCNC: 71 NG/DL — LOW (ref 80–200)
T4 AB SER-ACNC: 6.7 UG/DL — SIGNIFICANT CHANGE UP (ref 4.6–12)
TOXIC GRANULES BLD QL SMEAR: PRESENT — SIGNIFICANT CHANGE UP
TSH SERPL-MCNC: 0.2 UIU/ML — LOW (ref 0.27–4.2)
VARIANT LYMPHS # BLD: 6.8 % — HIGH (ref 0–5)
WBC # BLD: 10.87 K/UL — HIGH (ref 4.8–10.8)
WBC # FLD AUTO: 10.87 K/UL — HIGH (ref 4.8–10.8)

## 2018-10-05 RX ORDER — ACETAMINOPHEN 500 MG
1 TABLET ORAL
Qty: 90 | Refills: 3 | OUTPATIENT
Start: 2018-10-05 | End: 2019-02-01

## 2018-10-05 RX ORDER — DEXTROSE 50 % IN WATER 50 %
15 SYRINGE (ML) INTRAVENOUS ONCE
Qty: 0 | Refills: 0 | Status: DISCONTINUED | OUTPATIENT
Start: 2018-10-05 | End: 2018-10-06

## 2018-10-05 RX ORDER — APIXABAN 2.5 MG/1
10 TABLET, FILM COATED ORAL EVERY 12 HOURS
Qty: 0 | Refills: 0 | Status: DISCONTINUED | OUTPATIENT
Start: 2018-10-05 | End: 2018-10-06

## 2018-10-05 RX ORDER — APIXABAN 2.5 MG/1
2 TABLET, FILM COATED ORAL
Qty: 120 | Refills: 4 | OUTPATIENT
Start: 2018-10-05 | End: 2019-03-03

## 2018-10-05 RX ORDER — GABAPENTIN 400 MG/1
1 CAPSULE ORAL
Qty: 0 | Refills: 0 | COMMUNITY

## 2018-10-05 RX ORDER — CLOPIDOGREL BISULFATE 75 MG/1
75 TABLET, FILM COATED ORAL DAILY
Qty: 0 | Refills: 0 | Status: DISCONTINUED | OUTPATIENT
Start: 2018-10-05 | End: 2018-10-06

## 2018-10-05 RX ORDER — ISOSORBIDE MONONITRATE 60 MG/1
10 TABLET, EXTENDED RELEASE ORAL
Qty: 0 | Refills: 0 | Status: DISCONTINUED | OUTPATIENT
Start: 2018-10-05 | End: 2018-10-05

## 2018-10-05 RX ORDER — DEXTROSE 50 % IN WATER 50 %
12.5 SYRINGE (ML) INTRAVENOUS ONCE
Qty: 0 | Refills: 0 | Status: DISCONTINUED | OUTPATIENT
Start: 2018-10-05 | End: 2018-10-06

## 2018-10-05 RX ORDER — METFORMIN HYDROCHLORIDE 850 MG/1
2 TABLET ORAL
Qty: 0 | Refills: 0 | COMMUNITY

## 2018-10-05 RX ORDER — ISOSORBIDE MONONITRATE 60 MG/1
0.5 TABLET, EXTENDED RELEASE ORAL
Qty: 0 | Refills: 0 | COMMUNITY
Start: 2018-10-05

## 2018-10-05 RX ORDER — INSULIN GLARGINE 100 [IU]/ML
60 INJECTION, SOLUTION SUBCUTANEOUS AT BEDTIME
Qty: 0 | Refills: 0 | Status: DISCONTINUED | OUTPATIENT
Start: 2018-10-05 | End: 2018-10-06

## 2018-10-05 RX ORDER — LACTULOSE 10 G/15ML
30 SOLUTION ORAL
Qty: 0 | Refills: 0 | COMMUNITY
Start: 2018-10-05

## 2018-10-05 RX ORDER — GABAPENTIN 400 MG/1
1 CAPSULE ORAL
Qty: 0 | Refills: 0 | COMMUNITY
Start: 2018-10-05

## 2018-10-05 RX ORDER — APIXABAN 2.5 MG/1
2 TABLET, FILM COATED ORAL
Qty: 80 | Refills: 0 | OUTPATIENT
Start: 2018-10-05 | End: 2018-10-24

## 2018-10-05 RX ORDER — INSULIN LISPRO 100/ML
VIAL (ML) SUBCUTANEOUS
Qty: 0 | Refills: 0 | Status: DISCONTINUED | OUTPATIENT
Start: 2018-10-05 | End: 2018-10-06

## 2018-10-05 RX ORDER — GLUCAGON INJECTION, SOLUTION 0.5 MG/.1ML
1 INJECTION, SOLUTION SUBCUTANEOUS ONCE
Qty: 0 | Refills: 0 | Status: DISCONTINUED | OUTPATIENT
Start: 2018-10-05 | End: 2018-10-06

## 2018-10-05 RX ORDER — DEXTROSE 50 % IN WATER 50 %
25 SYRINGE (ML) INTRAVENOUS ONCE
Qty: 0 | Refills: 0 | Status: DISCONTINUED | OUTPATIENT
Start: 2018-10-05 | End: 2018-10-06

## 2018-10-05 RX ORDER — ISOSORBIDE MONONITRATE 60 MG/1
1 TABLET, EXTENDED RELEASE ORAL
Qty: 0 | Refills: 0 | COMMUNITY

## 2018-10-05 RX ORDER — POLYETHYLENE GLYCOL 3350 17 G/17G
17 POWDER, FOR SOLUTION ORAL
Qty: 0 | Refills: 0 | COMMUNITY
Start: 2018-10-05

## 2018-10-05 RX ORDER — INSULIN LISPRO 100/ML
4 VIAL (ML) SUBCUTANEOUS ONCE
Qty: 0 | Refills: 0 | Status: COMPLETED | OUTPATIENT
Start: 2018-10-05 | End: 2018-10-05

## 2018-10-05 RX ORDER — INSULIN LISPRO 100/ML
20 VIAL (ML) SUBCUTANEOUS
Qty: 0 | Refills: 0 | Status: DISCONTINUED | OUTPATIENT
Start: 2018-10-05 | End: 2018-10-06

## 2018-10-05 RX ORDER — CHOLECALCIFEROL (VITAMIN D3) 125 MCG
2000 CAPSULE ORAL
Qty: 0 | Refills: 0 | COMMUNITY
Start: 2018-10-05

## 2018-10-05 RX ORDER — SODIUM CHLORIDE 9 MG/ML
1000 INJECTION, SOLUTION INTRAVENOUS
Qty: 0 | Refills: 0 | Status: DISCONTINUED | OUTPATIENT
Start: 2018-10-05 | End: 2018-10-06

## 2018-10-05 RX ORDER — OXYCODONE HYDROCHLORIDE 5 MG/1
1 TABLET ORAL
Qty: 21 | Refills: 0
Start: 2018-10-05 | End: 2018-10-11

## 2018-10-05 RX ADMIN — APIXABAN 10 MILLIGRAM(S): 2.5 TABLET, FILM COATED ORAL at 19:07

## 2018-10-05 RX ADMIN — Medication 4 UNIT(S): at 00:44

## 2018-10-05 RX ADMIN — Medication 7 UNIT(S): at 08:15

## 2018-10-05 RX ADMIN — Medication 5 MILLIGRAM(S): at 14:03

## 2018-10-05 RX ADMIN — ENOXAPARIN SODIUM 60 MILLIGRAM(S): 100 INJECTION SUBCUTANEOUS at 05:23

## 2018-10-05 RX ADMIN — SODIUM CHLORIDE 3 MILLILITER(S): 9 INJECTION INTRAMUSCULAR; INTRAVENOUS; SUBCUTANEOUS at 14:06

## 2018-10-05 RX ADMIN — GABAPENTIN 400 MILLIGRAM(S): 400 CAPSULE ORAL at 21:22

## 2018-10-05 RX ADMIN — SODIUM CHLORIDE 3 MILLILITER(S): 9 INJECTION INTRAMUSCULAR; INTRAVENOUS; SUBCUTANEOUS at 05:27

## 2018-10-05 RX ADMIN — Medication 20 UNIT(S): at 12:15

## 2018-10-05 RX ADMIN — TRAMADOL HYDROCHLORIDE 50 MILLIGRAM(S): 50 TABLET ORAL at 00:41

## 2018-10-05 RX ADMIN — GABAPENTIN 400 MILLIGRAM(S): 400 CAPSULE ORAL at 14:02

## 2018-10-05 RX ADMIN — INSULIN GLARGINE 25 UNIT(S): 100 INJECTION, SOLUTION SUBCUTANEOUS at 08:30

## 2018-10-05 RX ADMIN — SODIUM CHLORIDE 3 MILLILITER(S): 9 INJECTION INTRAMUSCULAR; INTRAVENOUS; SUBCUTANEOUS at 21:27

## 2018-10-05 RX ADMIN — POLYETHYLENE GLYCOL 3350 17 GRAM(S): 17 POWDER, FOR SOLUTION ORAL at 05:26

## 2018-10-05 RX ADMIN — PANTOPRAZOLE SODIUM 40 MILLIGRAM(S): 20 TABLET, DELAYED RELEASE ORAL at 05:27

## 2018-10-05 RX ADMIN — INSULIN GLARGINE 60 UNIT(S): 100 INJECTION, SOLUTION SUBCUTANEOUS at 21:21

## 2018-10-05 RX ADMIN — Medication 5 MILLIGRAM(S): at 05:21

## 2018-10-05 RX ADMIN — Medication 1: at 08:16

## 2018-10-05 RX ADMIN — OXYCODONE AND ACETAMINOPHEN 1 TABLET(S): 5; 325 TABLET ORAL at 21:57

## 2018-10-05 RX ADMIN — Medication 2000 UNIT(S): at 11:47

## 2018-10-05 RX ADMIN — Medication 12: at 12:15

## 2018-10-05 RX ADMIN — Medication 40 MILLIGRAM(S): at 05:21

## 2018-10-05 RX ADMIN — LACTULOSE 20 GRAM(S): 10 SOLUTION ORAL at 19:06

## 2018-10-05 RX ADMIN — OXYCODONE AND ACETAMINOPHEN 1 TABLET(S): 5; 325 TABLET ORAL at 21:27

## 2018-10-05 RX ADMIN — GABAPENTIN 400 MILLIGRAM(S): 400 CAPSULE ORAL at 05:24

## 2018-10-05 RX ADMIN — LACTULOSE 20 GRAM(S): 10 SOLUTION ORAL at 05:24

## 2018-10-05 RX ADMIN — POLYETHYLENE GLYCOL 3350 17 GRAM(S): 17 POWDER, FOR SOLUTION ORAL at 19:06

## 2018-10-05 RX ADMIN — ATORVASTATIN CALCIUM 40 MILLIGRAM(S): 80 TABLET, FILM COATED ORAL at 21:21

## 2018-10-05 RX ADMIN — Medication 81 MILLIGRAM(S): at 11:47

## 2018-10-05 RX ADMIN — Medication 5 MILLIGRAM(S): at 21:28

## 2018-10-05 RX ADMIN — CLOPIDOGREL BISULFATE 75 MILLIGRAM(S): 75 TABLET, FILM COATED ORAL at 14:03

## 2018-10-05 RX ADMIN — OXYCODONE AND ACETAMINOPHEN 1 TABLET(S): 5; 325 TABLET ORAL at 05:35

## 2018-10-05 RX ADMIN — LOSARTAN POTASSIUM 50 MILLIGRAM(S): 100 TABLET, FILM COATED ORAL at 05:21

## 2018-10-05 NOTE — DISCHARGE NOTE ADULT - PROVIDER TOKENS
TOKEN:'[id number]:'[id number]:'[id number]' TOKEN:'[id number]:'[id number]:'[id number]:'[id number]'

## 2018-10-05 NOTE — DISCHARGE NOTE ADULT - HOSPITAL COURSE
69 yo female patient with PMHx HTN, DM II, CAD/CABG x2 (last in 2013), DL, RA on chronic prednisone 15mg daily for 30 years complicated by osteoporosis and vertebral fractures s/p kyphoplasty 3 months ago, anemia, presenting because of worsening mid back pain and abdominal pain over the last 4 days.  Patient reports mid back severe pain, started spontaneously 4 days ago, no back trauma or injury. Pain is sharp, poorly relieved by tylenol. Unable to lay flat, she says she's been sleeping sitting on a sofa. Later on, she started experiencing abdominal pain, colicky, mainly at the RUQ, moderate in intensity, non radiating, not related to food intake, no nausea or vomiting or diarrhea associated, no fever or chills. No other complaints.  Note that patient has had decreased physical activity it's been around 9 months since she had the vertebral fractures. She spends most of her time in the sofa.    Pt underwent kyphoplasty for vertebral fracture. please f/u with neurology , neurosurgery and rheumatology.

## 2018-10-05 NOTE — DISCHARGE NOTE ADULT - CARE PROVIDER_API CALL
Stefany Belcher), Rheumatology  [address] [facility]  [address]  [place] [address]  Phone: [phone]  Fax: [phone]    Ambrose Tamayo), Neurology; Neuromuscular Medicine  [address] [facility] [address]  [place], [place] [id number]  Phone: [phone]  Fax: [phone]    Jayseh Maza), Hematology; Internal Medicine; Medical Oncology  [address] [place] [address]  Phone: [phone]  Fax: [phone] Stefany Belcher), Rheumatology  [address] [facility]  [address]  [place] [address]  Phone: [phone]  Fax: [phone]    Ambrose Tamayo), Neurology; Neuromuscular Medicine  [address] [facility] [address]  [place], [place] [id number]  Phone: [phone]  Fax: [phone]    Jayesh Maza), Hematology; Internal Medicine; Medical Oncology  [address] [place] [address]  Phone: [phone]  Fax: [phone]    Lukasz Alvarez), Surgical Physicians  [address] [place] [address] [address]  [place] [address]  Phone: [phone]  Fax: [phone]

## 2018-10-05 NOTE — PROGRESS NOTE ADULT - SUBJECTIVE AND OBJECTIVE BOX
SUBJECTIVE:    Patient is a 68y old Female who presents with a chief complaint of mid back pain of 4 days (04 Oct 2018 17:53)    Currently admitted to medicine with the primary diagnosis of Intractable pain     Today is hospital day 8d. This morning she is resting comfortably in bed and reports no new issues or overnight events. Pt is s/p kyphoplasty.    PAST MEDICAL & SURGICAL HISTORY  Anemia  Osteoporosis  Rheumatoid arthritis  Dyslipidemia  HTN (hypertension)  CAD (coronary artery disease)  Diabetes  History of hemorrhoidectomy    SOCIAL HISTORY:  Negative for smoking/alcohol/drug use.     ALLERGIES:  penicillin (Anaphylaxis; Angioedema)    MEDICATIONS:  STANDING MEDICATIONS  apixaban 10 milliGRAM(s) Oral every 12 hours  aspirin  chewable 81 milliGRAM(s) Oral daily  atorvastatin 40 milliGRAM(s) Oral at bedtime  cholecalciferol 2000 Unit(s) Oral daily  dextrose 5%. 1000 milliLiter(s) IV Continuous <Continuous>  dextrose 50% Injectable 12.5 Gram(s) IV Push once  dextrose 50% Injectable 25 Gram(s) IV Push once  dextrose 50% Injectable 25 Gram(s) IV Push once  furosemide    Tablet 40 milliGRAM(s) Oral daily  gabapentin 400 milliGRAM(s) Oral three times a day  influenza   Vaccine 0.5 milliLiter(s) IntraMuscular once  insulin glargine Injectable (LANTUS) 60 Unit(s) SubCutaneous at bedtime  insulin lispro (HumaLOG) corrective regimen sliding scale   SubCutaneous three times a day before meals  insulin lispro Injectable (HumaLOG) 20 Unit(s) SubCutaneous three times a day before meals  lactulose Syrup 20 Gram(s) Oral every 12 hours  losartan 50 milliGRAM(s) Oral daily  metoclopramide Injectable 10 milliGRAM(s) IV Push once  ondansetron Injectable 4 milliGRAM(s) IV Push once  pantoprazole    Tablet 40 milliGRAM(s) Oral before breakfast  polyethylene glycol 3350 17 Gram(s) Oral every 12 hours  predniSONE   Tablet 5 milliGRAM(s) Oral three times a day  sodium chloride 0.9% lock flush 3 milliLiter(s) IV Push every 8 hours    PRN MEDICATIONS  acetaminophen   Tablet .. 650 milliGRAM(s) Oral every 6 hours PRN  dextrose 40% Gel 15 Gram(s) Oral once PRN  glucagon  Injectable 1 milliGRAM(s) IntraMuscular once PRN  morphine  - Injectable 2 milliGRAM(s) IV Push every 3 hours PRN  oxyCODONE    5 mG/acetaminophen 325 mG 1 Tablet(s) Oral every 6 hours PRN  simethicone 80 milliGRAM(s) Chew four times a day PRN  traMADol 50 milliGRAM(s) Oral three times a day PRN    VITALS:   T(F): 96.3  HR: 65  BP: 116/62  RR: 18  SpO2: 97%    PHYSICAL EXAM:  GEN: No acute distress  LUNGS: Clear to auscultation bilaterally   HEART: S1/S2 present  ABD: Soft, non-tender, non-distended. Bowel sounds present  EXT and Back :- Tenderness at thoracic and lumbar spine area on palpation. Motor strength Left lower extremity 3/5 , sensation are normal to crude touch.  NEURO: AAOX3      LABS:                        8.9    10.87 )-----------( 85       ( 05 Oct 2018 07:04 )             29.7     10-05    135  |  96<L>  |  45<H>  ----------------------------<  189<H>  4.9   |  23  |  1.3    Ca    8.6      05 Oct 2018 07:04  Phos  4.2     10-04  Mg     2.0     10-04      PT/INR - ( 03 Oct 2018 12:37 )   PT: 11.40 sec;   INR: 1.06 ratio         PTT - ( 03 Oct 2018 12:37 )  PTT:42.2 sec                  RADIOLOGY:    1.  T10: Acute/subacute inferior endplate compression fracture of about   20%. 3 mm osseous retropulsion without cord compression.    2.  Chronic compression fractures of T4 (20%), T11 (50% with 3 mm osseous   retropulsion) and T12 (25% with 3 mm osseous retropulsion).    3.  Mild degenerative changes. SUBJECTIVE:    Patient is a 68y old Female who presents with a chief complaint of mid back pain of 4 days (04 Oct 2018 17:53)    Currently admitted to medicine with the primary diagnosis of Intractable pain     Today is hospital day 8d. This morning she is resting comfortably in bed and reports no new issues or overnight events. Pt is s/p kyphoplasty.    PAST MEDICAL & SURGICAL HISTORY  Anemia  Osteoporosis  Rheumatoid arthritis  Dyslipidemia  HTN (hypertension)  CAD (coronary artery disease)  Diabetes  History of hemorrhoidectomy    SOCIAL HISTORY:  Negative for smoking/alcohol/drug use.     ALLERGIES:  penicillin (Anaphylaxis; Angioedema)    MEDICATIONS:  STANDING MEDICATIONS  apixaban 10 milliGRAM(s) Oral every 12 hours  aspirin  chewable 81 milliGRAM(s) Oral daily  atorvastatin 40 milliGRAM(s) Oral at bedtime  cholecalciferol 2000 Unit(s) Oral daily  dextrose 5%. 1000 milliLiter(s) IV Continuous <Continuous>  dextrose 50% Injectable 12.5 Gram(s) IV Push once  dextrose 50% Injectable 25 Gram(s) IV Push once  dextrose 50% Injectable 25 Gram(s) IV Push once  furosemide    Tablet 40 milliGRAM(s) Oral daily  gabapentin 400 milliGRAM(s) Oral three times a day  influenza   Vaccine 0.5 milliLiter(s) IntraMuscular once  insulin glargine Injectable (LANTUS) 60 Unit(s) SubCutaneous at bedtime  insulin lispro (HumaLOG) corrective regimen sliding scale   SubCutaneous three times a day before meals  insulin lispro Injectable (HumaLOG) 20 Unit(s) SubCutaneous three times a day before meals  lactulose Syrup 20 Gram(s) Oral every 12 hours  losartan 50 milliGRAM(s) Oral daily  metoclopramide Injectable 10 milliGRAM(s) IV Push once  ondansetron Injectable 4 milliGRAM(s) IV Push once  pantoprazole    Tablet 40 milliGRAM(s) Oral before breakfast  polyethylene glycol 3350 17 Gram(s) Oral every 12 hours  predniSONE   Tablet 5 milliGRAM(s) Oral three times a day  sodium chloride 0.9% lock flush 3 milliLiter(s) IV Push every 8 hours    PRN MEDICATIONS  acetaminophen   Tablet .. 650 milliGRAM(s) Oral every 6 hours PRN  dextrose 40% Gel 15 Gram(s) Oral once PRN  glucagon  Injectable 1 milliGRAM(s) IntraMuscular once PRN  morphine  - Injectable 2 milliGRAM(s) IV Push every 3 hours PRN  oxyCODONE    5 mG/acetaminophen 325 mG 1 Tablet(s) Oral every 6 hours PRN  simethicone 80 milliGRAM(s) Chew four times a day PRN  traMADol 50 milliGRAM(s) Oral three times a day PRN    VITALS:   T(F): 96.3  HR: 65  BP: 116/62  RR: 18  SpO2: 97%    PHYSICAL EXAM:  GEN: No acute distress  LUNGS: Clear to auscultation bilaterally   HEART: S1/S2 present  ABD: Soft, non-tender, non-distended. Bowel sounds present  EXT and Back :- Tenderness at thoracic and lumbar spine area on palpation. Motor strength Left lower extremity 3/5 , sensation are normal to crude touch.  NEURO: AAOX3, PATTERSON      LABS:                        8.9    10.87 )-----------( 85       ( 05 Oct 2018 07:04 )             29.7     10-05    135  |  96<L>  |  45<H>  ----------------------------<  189<H>  4.9   |  23  |  1.3    Ca    8.6      05 Oct 2018 07:04  Phos  4.2     10-04  Mg     2.0     10-04      PT/INR - ( 03 Oct 2018 12:37 )   PT: 11.40 sec;   INR: 1.06 ratio         PTT - ( 03 Oct 2018 12:37 )  PTT:42.2 sec                  RADIOLOGY:    1.  T10: Acute/subacute inferior endplate compression fracture of about   20%. 3 mm osseous retropulsion without cord compression.    2.  Chronic compression fractures of T4 (20%), T11 (50% with 3 mm osseous   retropulsion) and T12 (25% with 3 mm osseous retropulsion).    3.  Mild degenerative changes.

## 2018-10-05 NOTE — PHYSICAL THERAPY INITIAL EVALUATION ADULT - ACTIVE RANGE OF MOTION EXAMINATION, REHAB EVAL
R hip 3/4 AROM, L  hip WFL, B knees/ ankles WFL,/bilateral  lower extremity Active ROM was WFL (within functional limits)

## 2018-10-05 NOTE — PROGRESS NOTE ADULT - ASSESSMENT
#New compression fracture T11 and 12  - CT Abdomen and Pelvis w/ IV Cont (09.27.18 @ 00:32) New T11 and T12 moderate compression deformities. Unchanged moderate compression deformities of L3, L4 post kyphoplasty of L4.  - Neurosurgery eval: get MR (LS) under anesthesia (discussed w/ anesthesia, will be done this week)  - pain control for back pain: tramadol prn tid, and morphine 2mg q3h prn (pain not adequately controlled- increased frequency from q4 to q3)  - MRI was performed today , discussed with neurosurgery   - Pt under went kyphoplasty.  -Will NEED PT/OT after procedure    #Acute DVT left femoral  - Pt on Apixaban now  - Patient could net get VQ scan due to pain while lying back , not tachycardic, O2 >95 on RA, get VQ scan once pain under control echo    #) Proximal Muscle weakness  - recommend rheumatology eval with Dr. Belcher (pt previously seen by their group)  - check aldolase, TSH, thyroglobulin, TPO Ab, T3, T4, ACTH, aldosterone, NT5C1A (anti-CN-1a) ab, Louise-1 ab, HMG CoA reductase ab, SRP ab, ESR, CRP, NIEVES, ANCA, SSA, SSB, B2 glycoprotein, MuSK ab, LRP4 ab, AchR ab, anti-Mi2 ab, ACE level, lyme wb  - consider starting azathioprine 25 mg QD if ok with rheumatology  - neurosurgery f/u for thoracic compression fracture  - outpt EMG/NCS  - PT/OT        #Abd pain - probably referred pain  - c/w pain meds  - colace, senna for constipation  -lactulose now for BM    # troponemia:  - patient denies angina or shortness of breath  - d/w cardiac fellow, no intervention in setting of asymptomatic troponemia, possibly demand 2/2 anemia  - f/u third set CE negative     #RA  - c/w prednisone  - out pt F/u with rheumatology    #) DM  -Lantus and lispro adjusted for suboptimal blood pressure control.    #Thrombocytopenia + Microcytic anemia  - H/o hemorrhoids S/p hemorrhoidectomy  --Microcytic anemia Secondary to thalassemia minor  --Continue to monitor hemoglobin.   hematology eval:Probably related to consumption from DVT d/t acuity of drop in counts.    --Repeat CBC, if the counts are persistently low, will do a complete w/u including BM biopsy  -will continue to monitor the counts     DVT PPX :Pt already on Apixiciban for DVT in left femoral  -Plavix changed to ASA. #New compression fracture T11 and 12  - CT Abdomen and Pelvis w/ IV Cont (09.27.18 @ 00:32) New T11 and T12 moderate compression deformities. Unchanged moderate compression deformities of L3, L4 post kyphoplasty of L4.  - Neurosurgery eval: get MR (LS) under anesthesia (discussed w/ anesthesia, will be done this week)  - pain control for back pain: tramadol prn tid, and morphine 2mg q3h prn (pain not adequately controlled- increased frequency from q4 to q3)  - MRI was performed today , discussed with neurosurgery   - Pt under went kyphoplasty.  -Will NEED PT/OT after procedure    #Acute DVT left femoral  - Pt on Apixaban now 10BID for 7 days, then 5 BID      #) Proximal Muscle weakness  - recommend rheumatology eval with Dr. Belcher (pt previously seen by their group)  - check aldolase, TSH, thyroglobulin, TPO Ab, T3, T4, ACTH, aldosterone, NT5C1A (anti-CN-1a) ab, Louise-1 ab, HMG CoA reductase ab, SRP ab, ESR, CRP, NIEVES, ANCA, SSA, SSB, B2 glycoprotein, MuSK ab, LRP4 ab, AchR ab, anti-Mi2 ab, ACE level, lyme wb  - consider starting azathioprine 25 mg QD if ok with rheumatology  - neurosurgery f/u for thoracic compression fracture  - outpt EMG/NCS  - PT/OT      #Abd pain - probably referred pain  - c/w pain meds  - colace, senna for constipation  -lactulose now for BM    #?RA  - c/w prednisone  - out pt F/u with rheumatology    #) DM  -Lantus and lispro adjusted for suboptimal blood pressure control.    #Thrombocytopenia + Microcytic anemia  - H/o hemorrhoids S/p hemorrhoidectomy  --Microcytic anemia Secondary to thalassemia minor  --Continue to monitor hemoglobin.   hematology eval:Probably related to consumption from DVT d/t acuity of drop in counts.    --Repeat CBC, if the counts are persistently low, will do a complete w/u including BM biopsy  -will continue to monitor the counts     DVT PPX :Pt already on Apixiban for DVT in left femoral #New compression fracture T11 and 12  - CT Abdomen and Pelvis w/ IV Cont (09.27.18 @ 00:32) New T11 and T12 moderate compression deformities. Unchanged moderate compression deformities of L3, L4 post kyphoplasty of L4.  - Neurosurgery eval: get MR (LS) under anesthesia (discussed w/ anesthesia, will be done this week)  - pain control for back pain: tramadol prn tid, and morphine 2mg q3h prn (pain not adequately controlled- increased frequency from q4 to q3)  - MRI was performed today , discussed with neurosurgery   - Pt under went kyphoplasty.  -Will NEED PT/OT after procedure    #Acute DVT left femoral  - Pt on Apixaban now 10BID for 7 days, then 5 BID      #) Proximal Muscle weakness  - recommend rheumatology eval with Dr. Belcher (pt previously seen by their group)  - check aldolase, TSH, thyroglobulin, TPO Ab, T3, T4, ACTH, aldosterone, NT5C1A (anti-CN-1a) ab, Louise-1 ab, HMG CoA reductase ab, SRP ab, ESR, CRP, NIEVES, ANCA, SSA, SSB, B2 glycoprotein, MuSK ab, LRP4 ab, AchR ab, anti-Mi2 ab, ACE level, lyme wb  - consider starting azathioprine 25 mg QD if ok with rheumatology  - neurosurgery f/u for thoracic compression fracture  - outpt EMG/NCS  - PT/OT      #Abd pain - probably referred pain  - c/w pain meds  - colace, senna for constipation  -lactulose now for BM    #?RA  - c/w prednisone  - out pt F/u with rheumatology    #) DM  -Lantus and lispro adjusted for suboptimal blood pressure control.    #Thrombocytopenia + Microcytic anemia  - H/o hemorrhoids S/p hemorrhoidectomy  --Microcytic anemia Secondary to thalassemia minor  --Continue to monitor hemoglobin.   hematology eval:Probably related to consumption from DVT d/t acuity of drop in counts.    --Repeat CBC, if the counts are persistently low, will do a complete w/u including BM biopsy  -will continue to monitor the counts     DVT PPX :Pt already on Apixiban for DVT in left femoral    TALKED TO PHARMACY YOMI IS COVERED BY HER INSURANCE.

## 2018-10-05 NOTE — DISCHARGE NOTE ADULT - MEDICATION SUMMARY - MEDICATIONS TO TAKE
I will START or STAY ON the medications listed below when I get home from the hospital:    predniSONE 5 mg oral tablet  -- 1 tab(s) by mouth 3 times a day  -- Indication: For PMR    oxyCODONE 5 mg oral capsule  -- 1 cap(s) by mouth 3 times a day MDD:3 tabs per day  -- Caution federal law prohibits the transfer of this drug to any person other  than the person for whom it was prescribed.  It is very important that you take or use this exactly as directed.  Do not skip doses or discontinue unless directed by your doctor.  May cause drowsiness.  Alcohol may intensify this effect.  Use care when operating dangerous machinery.  This prescription cannot be refilled.  Using more of this medication than prescribed may cause serious breathing problems.    -- Indication: For Pain    losartan 50 mg oral tablet  -- 1 tab(s) by mouth once a day  -- Indication: For HTN (hypertension)    isosorbide mononitrate 20 mg oral tablet  -- 0.5 tab(s) by mouth 2 times a day  -- Indication: For HTN (hypertension)    apixaban 5 mg oral tablet  -- 2 tab(s) by mouth every 12 hours until 10/12; then 1 tab twice daily thereafterl   -- Indication: For Clot    gabapentin 400 mg oral capsule  -- 1 cap(s) by mouth 3 times a day  -- Indication: For Neuropathy    metFORMIN 500 mg oral tablet  -- 1 tab(s) by mouth 3 times a day  -- Indication: For DM    Januvia 100 mg oral tablet  -- 1 tab(s) by mouth once a day  -- Indication: For DM    Levemir FlexPen 100 units/mL subcutaneous solution  -- 30 unit(s) subcutaneous 2 times a day  -- Indication: For DM    atorvastatin 40 mg oral tablet  -- 1 tab(s) by mouth once a day (at bedtime)  -- Indication: For DLD    Plavix 75 mg oral tablet  -- 1 tab(s) by mouth once a day  -- Indication: For CAD (coronary artery disease)    Lasix 20 mg oral tablet  -- 1 tab(s) by mouth once a day, As Needed  -- Indication: For HTN (hypertension)    raNITIdine 150 mg oral tablet  -- 1 tab(s) by mouth 2 times a day  -- Indication: For GERD    lactulose 10 g/15 mL oral syrup  -- 30 milliliter(s) by mouth every 12 hours  -- Indication: For Constipation    polyethylene glycol 3350 oral powder for reconstitution  -- 17 gram(s) by mouth every 12 hours  -- Indication: For Constiaption    omeprazole 20 mg oral delayed release capsule  -- 1 cap(s) by mouth once a day  -- Indication: For GERD    cholecalciferol oral tablet  -- 2000 unit(s) by mouth once a day  -- Indication: For Vitamin D I will START or STAY ON the medications listed below when I get home from the hospital:    predniSONE 5 mg oral tablet  -- 1 tab(s) by mouth 3 times a day  -- Indication: For PMR    oxyCODONE 5 mg oral capsule  -- 1 cap(s) by mouth 3 times a day MDD:3 tabs per day  -- Caution federal law prohibits the transfer of this drug to any person other  than the person for whom it was prescribed.  It is very important that you take or use this exactly as directed.  Do not skip doses or discontinue unless directed by your doctor.  May cause drowsiness.  Alcohol may intensify this effect.  Use care when operating dangerous machinery.  This prescription cannot be refilled.  Using more of this medication than prescribed may cause serious breathing problems.    -- Indication: For Pain    losartan 50 mg oral tablet  -- 1 tab(s) by mouth once a day  -- Indication: For HTN (hypertension)    isosorbide mononitrate 20 mg oral tablet  -- 0.5 tab(s) by mouth 2 times a day  -- Indication: For HTN (hypertension)    apixaban 5 mg oral tablet  -- 2 tab(s) by mouth every 12 hours until 10/12; then 1 tab twice daily thereafterl   -- Indication: For Clot    gabapentin 400 mg oral capsule  -- 1 cap(s) by mouth 3 times a day  -- Indication: For Neuropathy    metFORMIN 500 mg oral tablet  -- 1 tab(s) by mouth 3 times a day  -- Indication: For DM    Januvia 100 mg oral tablet  -- 1 tab(s) by mouth once a day  -- Indication: For DM    Levemir FlexPen 100 units/mL subcutaneous solution  -- 30 unit(s) subcutaneous 2 times a day  -- Indication: For DM    atorvastatin 40 mg oral tablet  -- 1 tab(s) by mouth once a day (at bedtime)  -- Indication: For DLD    Plavix 75 mg oral tablet  -- 1 tab(s) by mouth once a day  -- Indication: For CAD (coronary artery disease)    Baciguent 500 units/g topical ointment  -- Apply on skin to affected area 2 times a day MDD:Topical   -- For external use only.    -- Indication: For Topical  Wound Care    Lasix 20 mg oral tablet  -- 1 tab(s) by mouth once a day, As Needed  -- Indication: For HTN (hypertension)    raNITIdine 150 mg oral tablet  -- 1 tab(s) by mouth 2 times a day  -- Indication: For GERD    lactulose 10 g/15 mL oral syrup  -- 30 milliliter(s) by mouth every 12 hours  -- Indication: For Constipation    polyethylene glycol 3350 oral powder for reconstitution  -- 17 gram(s) by mouth every 12 hours  -- Indication: For Constiaption    omeprazole 20 mg oral delayed release capsule  -- 1 cap(s) by mouth once a day  -- Indication: For GERD    cholecalciferol oral tablet  -- 2000 unit(s) by mouth once a day  -- Indication: For Vitamin D I will START or STAY ON the medications listed below when I get home from the hospital:    predniSONE 5 mg oral tablet  -- 1 tab(s) by mouth 3 times a day  -- Indication: For PMR    oxyCODONE 5 mg oral capsule  -- 1 cap(s) by mouth 3 times a day MDD:3 tabs per day  -- Caution federal law prohibits the transfer of this drug to any person other  than the person for whom it was prescribed.  It is very important that you take or use this exactly as directed.  Do not skip doses or discontinue unless directed by your doctor.  May cause drowsiness.  Alcohol may intensify this effect.  Use care when operating dangerous machinery.  This prescription cannot be refilled.  Using more of this medication than prescribed may cause serious breathing problems.    -- Indication: For Pain    losartan 50 mg oral tablet  -- 1 tab(s) by mouth once a day  -- Indication: For HTN (hypertension)    isosorbide mononitrate 20 mg oral tablet  -- 0.5 tab(s) by mouth 2 times a day  -- Indication: For HTN (hypertension)    apixaban 5 mg oral tablet  -- 2 tab(s) by mouth every 12 hours until 10/12; then 1 tab twice daily thereafterl   -- Indication: For Clot    gabapentin 400 mg oral capsule  -- 1 cap(s) by mouth 3 times a day  -- Indication: For Neuropathy     metFORMIN 500 mg oral tablet  -- 1 tab(s) by mouth 3 times a day  -- Indication: For DM    Januvia 100 mg oral tablet  -- 1 tab(s) by mouth once a day  -- Indication: For DM    Levemir FlexPen 100 units/mL subcutaneous solution  -- 30 unit(s) subcutaneous 2 times a day  -- Indication: For DM    atorvastatin 40 mg oral tablet  -- 1 tab(s) by mouth once a day (at bedtime)  -- Indication: For DLD    Plavix 75 mg oral tablet  -- 1 tab(s) by mouth once a day  -- Indication: For CAD (coronary artery disease)    Baciguent 500 units/g topical ointment  -- Apply on skin to affected area 2 times a day MDD:Topical   -- For external use only.    -- Indication: For Topical  Wound Care    Lasix 20 mg oral tablet  -- 1 tab(s) by mouth once a day, As Needed  -- Indication: For HTN (hypertension)    raNITIdine 150 mg oral tablet  -- 1 tab(s) by mouth 2 times a day  -- Indication: For GERD    lactulose 10 g/15 mL oral syrup  -- 20 milliliter(s) by mouth every 12 hours   -- Indication: For Constipation    omeprazole 20 mg oral delayed release capsule  -- 1 cap(s) by mouth once a day  -- Indication: For GERD    cholecalciferol oral tablet  -- 2000 unit(s) by mouth once a day  -- Indication: For Vitamin

## 2018-10-05 NOTE — DISCHARGE NOTE ADULT - PLAN OF CARE
treatment follow up with neurosurgeon follow up with rheumatologist follow up with hematologist outpt follow up with neurology for EMG/NCS  Follow up outstanding labs

## 2018-10-05 NOTE — DIETITIAN INITIAL EVALUATION ADULT. - ENERGY NEEDS
Calories: 3224-6575 kcals/day (MSJ x 1.2-1.3)  Protein: 57-68 g/day (1-1.2 g/kg ABW)  Fluids: 1 ml/kcal

## 2018-10-05 NOTE — DIETITIAN INITIAL EVALUATION ADULT. - OTHER INFO
RD assessment as LOS. Pt s/p Y12 kyphoplasty reports good marion/PO intake. Consumed 100% of documented meals. Last BM 10/2- constipated, on bowel regimen. No pressure wound. No edema noted.

## 2018-10-05 NOTE — DISCHARGE NOTE ADULT - CARE PLAN
Principal Discharge DX:	Non-traumatic compression fracture of vertebra, initial encounter  Goal:	treatment  Assessment and plan of treatment:	follow up with neurosurgeon  Secondary Diagnosis:	Rheumatoid arthritis, involving unspecified site, unspecified rheumatoid factor presence  Assessment and plan of treatment:	follow up with rheumatologist  Secondary Diagnosis:	Thrombocytopenia  Assessment and plan of treatment:	follow up with hematologist  Secondary Diagnosis:	Weakness  Assessment and plan of treatment:	outpt follow up with neurology for EMG/NCS  Follow up outstanding labs

## 2018-10-05 NOTE — PHYSICAL THERAPY INITIAL EVALUATION ADULT - GENERAL OBSERVATIONS, REHAB EVAL
13:55-14:25 Pt encountered semifowler in bed in NAD. Pt reports pain improved since kyphoplasty but still present t 4/10

## 2018-10-05 NOTE — PROGRESS NOTE ADULT - ATTENDING COMMENTS
Patient seen and examined independently earlier today. Case discussed with housestaff, nursing, social work, patient, daughter-in-law. I agree with most of the resident's note, physical exam, and plan except as below. Patient feels better. Decreased back pain. +BMs. No CP, SOB. No new complaints. Will switch pt to Eliquis 10 BID for 7 days, then 5mg BID. Monitor platelets. Switch ASA back to Plavix. Recall PT. If platelets remain stable, d/c planning based on PT recs. Will need outpt f/u with neuro for EMG/NCS and with rheumatology for possible steroid sparing meds.

## 2018-10-05 NOTE — DISCHARGE NOTE ADULT - CARE PROVIDERS DIRECT ADDRESSES
[email or web address]

## 2018-10-05 NOTE — DISCHARGE NOTE ADULT - SECONDARY DIAGNOSIS.
Rheumatoid arthritis, involving unspecified site, unspecified rheumatoid factor presence Thrombocytopenia Weakness

## 2018-10-05 NOTE — PROGRESS NOTE ADULT - SUBJECTIVE AND OBJECTIVE BOX
POD # 1    S/P Y12 kyphoplasty     Pt seen and examined at bedside pt states her back pain is improved       Vital Signs Last 24 Hrs  T(C): 35.7 (05 Oct 2018 07:30), Max: 36.6 (04 Oct 2018 14:55)  T(F): 96.3 (05 Oct 2018 07:30), Max: 97.8 (04 Oct 2018 14:55)  HR: 65 (05 Oct 2018 07:30) (64 - 78)  BP: 116/62 (05 Oct 2018 07:30) (112/53 - 165/82)  BP(mean): --  RR: 18 (05 Oct 2018 07:30) (16 - 19)  SpO2: 97% (04 Oct 2018 16:10) (97% - 100%)    PHYSICAL EXAM:    Alert, MAEX4  MS 5/5 bilaterally   incision clean dry intact       MEDICATIONS:  Antibiotics:    Neuro:  acetaminophen   Tablet .. 650 milliGRAM(s) Oral every 6 hours PRN  gabapentin 400 milliGRAM(s) Oral three times a day  metoclopramide Injectable 10 milliGRAM(s) IV Push once  morphine  - Injectable 2 milliGRAM(s) IV Push every 3 hours PRN  ondansetron Injectable 4 milliGRAM(s) IV Push once  oxyCODONE    5 mG/acetaminophen 325 mG 1 Tablet(s) Oral every 6 hours PRN  traMADol 50 milliGRAM(s) Oral three times a day PRN    Anticoagulation:  apixaban 10 milliGRAM(s) Oral every 12 hours  aspirin  chewable 81 milliGRAM(s) Oral daily    OTHER:  atorvastatin 40 milliGRAM(s) Oral at bedtime  dextrose 40% Gel 15 Gram(s) Oral once PRN  dextrose 50% Injectable 12.5 Gram(s) IV Push once  dextrose 50% Injectable 25 Gram(s) IV Push once  dextrose 50% Injectable 25 Gram(s) IV Push once  furosemide    Tablet 40 milliGRAM(s) Oral daily  glucagon  Injectable 1 milliGRAM(s) IntraMuscular once PRN  influenza   Vaccine 0.5 milliLiter(s) IntraMuscular once  insulin glargine Injectable (LANTUS) 60 Unit(s) SubCutaneous at bedtime  insulin lispro (HumaLOG) corrective regimen sliding scale   SubCutaneous three times a day before meals  insulin lispro Injectable (HumaLOG) 20 Unit(s) SubCutaneous three times a day before meals  lactulose Syrup 20 Gram(s) Oral every 12 hours  losartan 50 milliGRAM(s) Oral daily  pantoprazole    Tablet 40 milliGRAM(s) Oral before breakfast  polyethylene glycol 3350 17 Gram(s) Oral every 12 hours  predniSONE   Tablet 5 milliGRAM(s) Oral three times a day  simethicone 80 milliGRAM(s) Chew four times a day PRN    IVF:  cholecalciferol 2000 Unit(s) Oral daily  dextrose 5%. 1000 milliLiter(s) IV Continuous <Continuous>  sodium chloride 0.9% lock flush 3 milliLiter(s) IV Push every 8 hours        LABS:                        8.9    10.87 )-----------( 85       ( 05 Oct 2018 07:04 )             29.7     10-05    135  |  96<L>  |  45<H>  ----------------------------<  189<H>  4.9   |  23  |  1.3    Ca    8.6      05 Oct 2018 07:04  Phos  4.2     10-04  Mg     2.0     10-04    A/P     S/P T12 Kyphoplasty           stable           PT / rehab           follow up with Dr Alvarez in 1-2 weeks

## 2018-10-05 NOTE — DISCHARGE NOTE ADULT - ADDITIONAL INSTRUCTIONS
-Please take your medications on time and as prescribed.  -Try to keep the wound clean and dry. Can shower in 2 days.  -Contact neurosurgery , rheumatology and your primary care to set up an appointment for follow up .

## 2018-10-06 VITALS
SYSTOLIC BLOOD PRESSURE: 127 MMHG | RESPIRATION RATE: 19 BRPM | TEMPERATURE: 96 F | HEART RATE: 72 BPM | DIASTOLIC BLOOD PRESSURE: 59 MMHG

## 2018-10-06 LAB
GLUCOSE BLDC GLUCOMTR-MCNC: 148 MG/DL — HIGH (ref 70–99)
GLUCOSE BLDC GLUCOMTR-MCNC: 216 MG/DL — HIGH (ref 70–99)
GLUCOSE BLDC GLUCOMTR-MCNC: 216 MG/DL — HIGH (ref 70–99)
T3FREE SERPL-MCNC: 1.94 PG/ML — SIGNIFICANT CHANGE UP (ref 1.8–4.6)
T4 FREE SERPL-MCNC: 1.8 NG/DL — SIGNIFICANT CHANGE UP (ref 0.9–1.8)

## 2018-10-06 RX ORDER — BACITRACIN ZINC 500 UNIT/G
1 OINTMENT IN PACKET (EA) TOPICAL
Qty: 400 | Refills: 0
Start: 2018-10-06 | End: 2018-10-20

## 2018-10-06 RX ORDER — METFORMIN HYDROCHLORIDE 850 MG/1
1 TABLET ORAL
Qty: 0 | Refills: 0 | COMMUNITY

## 2018-10-06 RX ORDER — APIXABAN 2.5 MG/1
2 TABLET, FILM COATED ORAL
Qty: 80 | Refills: 0
Start: 2018-10-06 | End: 2018-10-25

## 2018-10-06 RX ORDER — LACTULOSE 10 G/15ML
30 SOLUTION ORAL EVERY 4 HOURS
Qty: 0 | Refills: 0 | Status: DISCONTINUED | OUTPATIENT
Start: 2018-10-06 | End: 2018-10-06

## 2018-10-06 RX ORDER — CHOLECALCIFEROL (VITAMIN D3) 125 MCG
2000 CAPSULE ORAL
Qty: 30 | Refills: 3
Start: 2018-10-06 | End: 2019-02-02

## 2018-10-06 RX ORDER — METFORMIN HYDROCHLORIDE 850 MG/1
1 TABLET ORAL
Qty: 90 | Refills: 4
Start: 2018-10-06 | End: 2019-03-04

## 2018-10-06 RX ORDER — GABAPENTIN 400 MG/1
1 CAPSULE ORAL
Qty: 90 | Refills: 4
Start: 2018-10-06 | End: 2019-03-04

## 2018-10-06 RX ORDER — LACTULOSE 10 G/15ML
20 SOLUTION ORAL
Qty: 250 | Refills: 4
Start: 2018-10-06 | End: 2019-03-04

## 2018-10-06 RX ORDER — ISOSORBIDE MONONITRATE 60 MG/1
0.5 TABLET, EXTENDED RELEASE ORAL
Qty: 30 | Refills: 2
Start: 2018-10-06 | End: 2019-01-03

## 2018-10-06 RX ADMIN — Medication 20 UNIT(S): at 07:59

## 2018-10-06 RX ADMIN — Medication 20 UNIT(S): at 12:20

## 2018-10-06 RX ADMIN — Medication 5 MILLIGRAM(S): at 05:57

## 2018-10-06 RX ADMIN — PANTOPRAZOLE SODIUM 40 MILLIGRAM(S): 20 TABLET, DELAYED RELEASE ORAL at 06:01

## 2018-10-06 RX ADMIN — Medication 40 MILLIGRAM(S): at 05:57

## 2018-10-06 RX ADMIN — SODIUM CHLORIDE 3 MILLILITER(S): 9 INJECTION INTRAMUSCULAR; INTRAVENOUS; SUBCUTANEOUS at 06:01

## 2018-10-06 RX ADMIN — APIXABAN 10 MILLIGRAM(S): 2.5 TABLET, FILM COATED ORAL at 05:58

## 2018-10-06 RX ADMIN — LACTULOSE 20 GRAM(S): 10 SOLUTION ORAL at 06:01

## 2018-10-06 RX ADMIN — CLOPIDOGREL BISULFATE 75 MILLIGRAM(S): 75 TABLET, FILM COATED ORAL at 11:37

## 2018-10-06 RX ADMIN — Medication 1 ENEMA: at 12:56

## 2018-10-06 RX ADMIN — Medication 4: at 07:56

## 2018-10-06 RX ADMIN — OXYCODONE AND ACETAMINOPHEN 1 TABLET(S): 5; 325 TABLET ORAL at 09:53

## 2018-10-06 RX ADMIN — LOSARTAN POTASSIUM 50 MILLIGRAM(S): 100 TABLET, FILM COATED ORAL at 06:00

## 2018-10-06 RX ADMIN — Medication 2000 UNIT(S): at 11:37

## 2018-10-06 RX ADMIN — GABAPENTIN 400 MILLIGRAM(S): 400 CAPSULE ORAL at 06:02

## 2018-10-06 RX ADMIN — POLYETHYLENE GLYCOL 3350 17 GRAM(S): 17 POWDER, FOR SOLUTION ORAL at 06:01

## 2018-10-06 NOTE — CHART NOTE - NSCHARTNOTEFT_GEN_A_CORE
<<<RESIDENT DISCHARGE NOTE>>>     MALLIKA WILLSON  MRN-[id number]    VITAL SIGNS:  T(F): 96.1 (10-06-18 @ 07:31), Max: 97.3 (10-05-18 @ 23:42)  HR: 72 (10-06-18 @ 07:31)  BP: 127/59 (10-06-18 @ 07:31)  SpO2: --      PHYSICAL EXAMINATION:  GEN: No acute distress  LUNGS: Clear to auscultation bilaterally   HEART: S1/S2 present  ABD: Soft, complaints of mild distention Bowel sounds present   EXT and Back :- Tenderness at thoracic and lumbar spine area on palpation. Motor strength Left lower extremity 3/5 , sensation are normal to crude touch.  NEURO: AAOX3, PATTERSON    TEST RESULTS:                        8.9    10.87 )-----------( 85       ( 05 Oct 2018 07:04 )             29.7       10-05    135  |  96<L>  |  45<H>  ----------------------------<  189<H>  4.9   |  23  |  1.3    Ca    8.6      05 Oct 2018 07:04        FINAL DISCHARGE INTERVIEW:  Resident(s) Present: (Name:Sunshine___________), RN Present: (Name:  Alexei_______)    DISCHARGE MEDICATION RECONCILIATION  reviewed with Attending (Name:____Dejon Barlow_______)    DISPOSITION:   [  ] Home,    [ X ] Home with Visiting Nursing Services,   [    ]  SNF/ NH,    [   ] Acute Rehab (4A),   [   ] Other (Specify:_________)

## 2018-10-06 NOTE — PROGRESS NOTE ADULT - SUBJECTIVE AND OBJECTIVE BOX
SUBJECTIVE:    Patient is a 68y old Female who presents with a chief complaint of mid back pain of 4 days (05 Oct 2018 12:54)    Currently admitted to medicine with the primary diagnosis of Non-traumatic compression fracture of vertebra, initial encounter     Today is hospital day 9d. This morning she is resting comfortably in bed and reports no new issues or overnight events.     PAST MEDICAL & SURGICAL HISTORY  Anemia  Osteoporosis  Rheumatoid arthritis  Dyslipidemia  HTN (hypertension)  CAD (coronary artery disease)  Diabetes  History of hemorrhoidectomy    SOCIAL HISTORY:  Negative for smoking/alcohol/drug use.     ALLERGIES:  penicillin (Anaphylaxis; Angioedema)    MEDICATIONS:  STANDING MEDICATIONS  apixaban 10 milliGRAM(s) Oral every 12 hours  atorvastatin 40 milliGRAM(s) Oral at bedtime  cholecalciferol 2000 Unit(s) Oral daily  clopidogrel Tablet 75 milliGRAM(s) Oral daily  dextrose 5%. 1000 milliLiter(s) IV Continuous <Continuous>  dextrose 50% Injectable 12.5 Gram(s) IV Push once  dextrose 50% Injectable 25 Gram(s) IV Push once  dextrose 50% Injectable 25 Gram(s) IV Push once  furosemide    Tablet 40 milliGRAM(s) Oral daily  gabapentin 400 milliGRAM(s) Oral three times a day  influenza   Vaccine 0.5 milliLiter(s) IntraMuscular once  insulin glargine Injectable (LANTUS) 60 Unit(s) SubCutaneous at bedtime  insulin lispro (HumaLOG) corrective regimen sliding scale   SubCutaneous three times a day before meals  insulin lispro Injectable (HumaLOG) 20 Unit(s) SubCutaneous three times a day before meals  lactulose Syrup 20 Gram(s) Oral every 12 hours  losartan 50 milliGRAM(s) Oral daily  metoclopramide Injectable 10 milliGRAM(s) IV Push once  ondansetron Injectable 4 milliGRAM(s) IV Push once  pantoprazole    Tablet 40 milliGRAM(s) Oral before breakfast  polyethylene glycol 3350 17 Gram(s) Oral every 12 hours  predniSONE   Tablet 5 milliGRAM(s) Oral three times a day  sodium biphosphate Rectal Enema 1 Enema Rectal once  sodium chloride 0.9% lock flush 3 milliLiter(s) IV Push every 8 hours    PRN MEDICATIONS  acetaminophen   Tablet .. 650 milliGRAM(s) Oral every 6 hours PRN  dextrose 40% Gel 15 Gram(s) Oral once PRN  glucagon  Injectable 1 milliGRAM(s) IntraMuscular once PRN  oxyCODONE    5 mG/acetaminophen 325 mG 1 Tablet(s) Oral every 6 hours PRN  simethicone 80 milliGRAM(s) Chew four times a day PRN  traMADol 50 milliGRAM(s) Oral three times a day PRN    VITALS:   T(F): 96.1  HR: 72  BP: 127/59  RR: 19  SpO2: --    LABS:                        8.9    10.87 )-----------( 85       ( 05 Oct 2018 07:04 )             29.7     10-05    135  |  96<L>  |  45<H>  ----------------------------<  189<H>  4.9   |  23  |  1.3    Ca    8.6      05 Oct 2018 07:04                    RADIOLOGY:    PHYSICAL EXAM:  GEN: No acute distress  LUNGS: Clear to auscultation bilaterally   HEART: S1/S2 present. RRR.   ABD/ GI: Soft, non-tender, non-distended. Bowel sounds present  EXT: NC/NC/NE/2+PP/PATTERSON  NEURO: AAOX3

## 2018-10-06 NOTE — PROGRESS NOTE ADULT - PROVIDER SPECIALTY LIST ADULT
Heme/Onc
Hospitalist
Hospitalist
Internal Medicine
Neurology
Neurosurgery
Nutrition Support
Neurosurgery

## 2018-10-06 NOTE — PROGRESS NOTE ADULT - REASON FOR ADMISSION
mid back pain of 4 days

## 2018-10-06 NOTE — PROGRESS NOTE ADULT - ASSESSMENT
#New compression fracture T11 and 12  - CT Abdomen and Pelvis w/ IV Cont (09.27.18 @ 00:32) New T11 and T12 moderate compression deformities. Unchanged moderate compression deformities of L3, L4 post kyphoplasty of L4.  - - Pt under went kyphoplasty.  -cleared by neurosurgery    #Acute DVT left femoral  - Pt on Apixaban now 10BID for 7 days, then 5 BID      #) Proximal Muscle weakness  - recommend rheumatology eval with Dr. Belcher (pt previously seen by their group)  - -- outpt EMG/NCS  - PT/OT      #constipation  - c/w pain meds  - colace, senna for constipation  -lactulose now for BM    #?RA  - c/w prednisone  - out pt F/u with rheumatology    #) DM  -Lantus and lispro adjusted for suboptimal blood pressure control.    #Thrombocytopenia + Microcytic anemia  - H/o hemorrhoids S/p hemorrhoidectomy  --Microcytic anemia Secondary to thalassemia minor  --Continue to monitor hemoglobin.   hematology eval:Probably related to consumption from DVT d/t acuity of drop in counts.    --Repeat CBC, if the counts are persistently low, will do a complete w/u including BM biopsy  -will continue to monitor the counts     DVT PPX :Pt already on Apixiban for DVT in left femoral    DC home today-spent more than 30mins

## 2018-10-08 LAB
ACE SERPL-CCNC: 28 U/L — SIGNIFICANT CHANGE UP (ref 14–82)
ALDOLASE SERPL-CCNC: 5 U/L — SIGNIFICANT CHANGE UP (ref 3.3–10.3)

## 2018-10-09 ENCOUNTER — INBOUND DOCUMENT (OUTPATIENT)
Age: 68
End: 2018-10-09

## 2018-10-09 LAB — SURGICAL PATHOLOGY STUDY: SIGNIFICANT CHANGE UP

## 2018-10-10 LAB — STRIA MUS AB SER-ACNC: NEGATIVE — SIGNIFICANT CHANGE UP

## 2018-10-12 DIAGNOSIS — G72.0 DRUG-INDUCED MYOPATHY: ICD-10-CM

## 2018-10-12 DIAGNOSIS — R10.9 UNSPECIFIED ABDOMINAL PAIN: ICD-10-CM

## 2018-10-12 DIAGNOSIS — I82.412 ACUTE EMBOLISM AND THROMBOSIS OF LEFT FEMORAL VEIN: ICD-10-CM

## 2018-10-12 DIAGNOSIS — E11.40 TYPE 2 DIABETES MELLITUS WITH DIABETIC NEUROPATHY, UNSPECIFIED: ICD-10-CM

## 2018-10-12 DIAGNOSIS — D56.3 THALASSEMIA MINOR: ICD-10-CM

## 2018-10-12 DIAGNOSIS — M80.88XA OTHER OSTEOPOROSIS WITH CURRENT PATHOLOGICAL FRACTURE, VERTEBRA(E), INITIAL ENCOUNTER FOR FRACTURE: ICD-10-CM

## 2018-10-12 DIAGNOSIS — K59.00 CONSTIPATION, UNSPECIFIED: ICD-10-CM

## 2018-10-12 DIAGNOSIS — I10 ESSENTIAL (PRIMARY) HYPERTENSION: ICD-10-CM

## 2018-10-12 DIAGNOSIS — Z88.0 ALLERGY STATUS TO PENICILLIN: ICD-10-CM

## 2018-10-12 DIAGNOSIS — M06.9 RHEUMATOID ARTHRITIS, UNSPECIFIED: ICD-10-CM

## 2018-10-12 DIAGNOSIS — I25.10 ATHEROSCLEROTIC HEART DISEASE OF NATIVE CORONARY ARTERY WITHOUT ANGINA PECTORIS: ICD-10-CM

## 2018-10-12 DIAGNOSIS — Z95.1 PRESENCE OF AORTOCORONARY BYPASS GRAFT: ICD-10-CM

## 2018-10-12 DIAGNOSIS — D69.59 OTHER SECONDARY THROMBOCYTOPENIA: ICD-10-CM

## 2018-10-12 DIAGNOSIS — Z79.4 LONG TERM (CURRENT) USE OF INSULIN: ICD-10-CM

## 2018-10-12 DIAGNOSIS — E78.5 HYPERLIPIDEMIA, UNSPECIFIED: ICD-10-CM

## 2018-10-12 LAB — SOLUBLE LIVER IGG SER IA-ACNC: < 20.1 UNITS — SIGNIFICANT CHANGE UP

## 2018-10-18 LAB
ACRM MODULATING ANTIBODY: 0 NMOL/L — SIGNIFICANT CHANGE UP
MI2 AB SER QL: NEGATIVE — SIGNIFICANT CHANGE UP

## 2018-10-22 PROBLEM — E78.5 HYPERLIPIDEMIA, UNSPECIFIED: Chronic | Status: ACTIVE | Noted: 2018-09-27

## 2018-10-22 PROBLEM — D64.9 ANEMIA, UNSPECIFIED: Chronic | Status: ACTIVE | Noted: 2018-09-27

## 2018-10-22 PROBLEM — M81.0 AGE-RELATED OSTEOPOROSIS WITHOUT CURRENT PATHOLOGICAL FRACTURE: Chronic | Status: ACTIVE | Noted: 2018-09-27

## 2018-10-22 PROBLEM — I10 ESSENTIAL (PRIMARY) HYPERTENSION: Chronic | Status: ACTIVE | Noted: 2018-09-26

## 2018-10-22 PROBLEM — M06.9 RHEUMATOID ARTHRITIS, UNSPECIFIED: Chronic | Status: ACTIVE | Noted: 2018-09-27

## 2018-10-22 PROBLEM — E11.9 TYPE 2 DIABETES MELLITUS WITHOUT COMPLICATIONS: Chronic | Status: ACTIVE | Noted: 2018-09-26

## 2018-10-22 PROBLEM — I25.10 ATHEROSCLEROTIC HEART DISEASE OF NATIVE CORONARY ARTERY WITHOUT ANGINA PECTORIS: Chronic | Status: ACTIVE | Noted: 2018-09-26

## 2018-10-23 VITALS — BODY MASS INDEX: 28.07 KG/M2 | HEIGHT: 56 IN | WEIGHT: 124.78 LBS

## 2018-10-24 ENCOUNTER — APPOINTMENT (OUTPATIENT)
Dept: NEUROSURGERY | Facility: CLINIC | Age: 68
End: 2018-10-24
Payer: MEDICARE

## 2018-10-24 DIAGNOSIS — M06.9 RHEUMATOID ARTHRITIS, UNSPECIFIED: ICD-10-CM

## 2018-10-24 DIAGNOSIS — G72.9 MYOPATHY, UNSPECIFIED: ICD-10-CM

## 2018-10-24 PROCEDURE — 99213 OFFICE O/P EST LOW 20 MIN: CPT

## 2018-10-26 PROBLEM — G72.9 MYOPATHY: Status: ACTIVE | Noted: 2018-10-26

## 2018-10-26 PROBLEM — M06.9 RHEUMATOID ARTHRITIS, INVOLVING UNSPECIFIED SITE, UNSPECIFIED RHEUMATOID FACTOR PRESENCE: Status: ACTIVE | Noted: 2018-06-12

## 2018-12-19 RX ORDER — GABAPENTIN 400 MG/1
400 CAPSULE ORAL EVERY 8 HOURS
Qty: 90 | Refills: 5 | Status: ACTIVE | COMMUNITY
Start: 2018-12-19 | End: 1900-01-01

## 2019-01-25 NOTE — CONSULT NOTE ADULT - ASSESSMENT
IMPRESSION: Rehab of T11, T12 comp fx, osteoporsos, Vit D def, S/P Vit d treatment for 12 weeks    PRECAUTIONS: [x  ] Cardiac  [  ] Respiratory  [  ] Seizures [  ] Contact Isolation  [  ] Droplet Isolation  [  ] Other    Weight Bearing Status:  It was advised that family bring in her back brace    RECOMMENDATION:  She is followed by neurosurgery for possible new 2 kyphoplasties. For MRI.  After treatment of Vitamin D deficiency, it is appropriate to start on maintance dose of Vitamin d 2,000 units daily. I'll add.  She has a rheumatologist, dr. Belcher; it is appropriate to treat her ospteoporosis with a bisphosphonate as an outpatient.      Out of Bed to Chair     DVT/Decubiti Prophylaxis    REHAB PLAN:     [ xx  ] Bedside P/T 3-5 times a week   [   ]   Bedside O/T  2-3 times a week             [   ] No Rehab Therapy Indicated                   [   ]  Speech Therapy   Conditioning/ROM                                    ADL  Bed Mobility                                               Conditioning/ROM  Transfers                                                     Bed Mobility  Sitting /Standing Balance                         Transfers                                        Gait Training                                               Sitting/Standing Balance  Stair Training [   ]Applicable                    Home equipment Eval                                                                        Splinting  [   ] Only      GOALS:   ADL   [ x  ]   Independent                    Transfers  [x   ] Independent                          Ambulation  [x   ] Independent     [ x   ] With device                            [   ]  CG                                                         [   ]  CG                                                                  [   ] CG                            [    ] Min A                                                   [   ] Min A                                                              [   ] Min  A          DISCHARGE PLAN:   [   ]  Good candidate for Intensive Rehabilitation/Hospital based-4A [facility]                                             Will tolerate 3hrs Intensive Rehab Daily                                       [    ]  Short Term Rehab in Skilled Nursing Facility                                       [ xx   ]  Home with Outpatient or VN services                                         [    ]  Possible Candidate for Intensive Hospital based Rehab Bcc Infiltrative Histology Text: There were numerous aggregates of basaloid cells demonstrating an infiltrative pattern.

## 2019-05-10 ENCOUNTER — INPATIENT (INPATIENT)
Facility: HOSPITAL | Age: 69
LOS: 6 days | Discharge: HOME HEALTH | End: 2019-05-17
Attending: HOSPITALIST | Admitting: HOSPITALIST
Payer: MEDICARE

## 2019-05-10 VITALS
DIASTOLIC BLOOD PRESSURE: 62 MMHG | SYSTOLIC BLOOD PRESSURE: 107 MMHG | HEART RATE: 76 BPM | TEMPERATURE: 100 F | OXYGEN SATURATION: 96 % | RESPIRATION RATE: 18 BRPM

## 2019-05-10 DIAGNOSIS — Z98.890 OTHER SPECIFIED POSTPROCEDURAL STATES: Chronic | ICD-10-CM

## 2019-05-10 LAB
ALBUMIN SERPL ELPH-MCNC: 3.3 G/DL — LOW (ref 3.5–5.2)
ALP SERPL-CCNC: 76 U/L — SIGNIFICANT CHANGE UP (ref 30–115)
ALT FLD-CCNC: 20 U/L — SIGNIFICANT CHANGE UP (ref 0–41)
ANION GAP SERPL CALC-SCNC: 17 MMOL/L — HIGH (ref 7–14)
APPEARANCE UR: ABNORMAL
APTT BLD: 27.7 SEC — SIGNIFICANT CHANGE UP (ref 27–39.2)
AST SERPL-CCNC: 31 U/L — SIGNIFICANT CHANGE UP (ref 0–41)
B-OH-BUTYR SERPL-SCNC: 0.8 MMOL/L — HIGH
BACTERIA # UR AUTO: ABNORMAL /HPF
BASE EXCESS BLDV CALC-SCNC: -1 MMOL/L — SIGNIFICANT CHANGE UP (ref -2–2)
BASOPHILS # BLD AUTO: 0.18 K/UL — SIGNIFICANT CHANGE UP (ref 0–0.2)
BASOPHILS NFR BLD AUTO: 0.9 % — SIGNIFICANT CHANGE UP (ref 0–1)
BILIRUB DIRECT SERPL-MCNC: 0.2 MG/DL — SIGNIFICANT CHANGE UP (ref 0–0.2)
BILIRUB INDIRECT FLD-MCNC: 0.1 MG/DL — LOW (ref 0.2–1.2)
BILIRUB SERPL-MCNC: 0.3 MG/DL — SIGNIFICANT CHANGE UP (ref 0.2–1.2)
BILIRUB UR-MCNC: NEGATIVE — SIGNIFICANT CHANGE UP
BLD GP AB SCN SERPL QL: SIGNIFICANT CHANGE UP
BUN SERPL-MCNC: 25 MG/DL — HIGH (ref 10–20)
CA-I SERPL-SCNC: 1.2 MMOL/L — SIGNIFICANT CHANGE UP (ref 1.12–1.3)
CALCIUM SERPL-MCNC: 8.9 MG/DL — SIGNIFICANT CHANGE UP (ref 8.5–10.1)
CHLORIDE SERPL-SCNC: 93 MMOL/L — LOW (ref 98–110)
CK MB CFR SERPL CALC: <1 NG/ML — SIGNIFICANT CHANGE UP (ref 0.6–6.3)
CK SERPL-CCNC: 46 U/L — SIGNIFICANT CHANGE UP (ref 0–225)
CO2 SERPL-SCNC: 20 MMOL/L — SIGNIFICANT CHANGE UP (ref 17–32)
COLOR SPEC: YELLOW — SIGNIFICANT CHANGE UP
CREAT SERPL-MCNC: 1.6 MG/DL — HIGH (ref 0.7–1.5)
D DIMER BLD IA.RAPID-MCNC: 2672 NG/ML DDU — HIGH (ref 0–230)
DIFF PNL FLD: ABNORMAL
EOSINOPHIL # BLD AUTO: 0 K/UL — SIGNIFICANT CHANGE UP (ref 0–0.7)
EOSINOPHIL NFR BLD AUTO: 0 % — SIGNIFICANT CHANGE UP (ref 0–8)
EPI CELLS # UR: ABNORMAL /HPF
GAS PNL BLDV: 131 MMOL/L — LOW (ref 136–145)
GAS PNL BLDV: SIGNIFICANT CHANGE UP
GLUCOSE SERPL-MCNC: 240 MG/DL — HIGH (ref 70–99)
GLUCOSE UR QL: NEGATIVE MG/DL — SIGNIFICANT CHANGE UP
GRAN CASTS # UR COMP ASSIST: ABNORMAL /LPF
HCO3 BLDV-SCNC: 24 MMOL/L — SIGNIFICANT CHANGE UP (ref 22–29)
HCT VFR BLD CALC: 33.5 % — LOW (ref 37–47)
HCT VFR BLDA CALC: 33.2 % — LOW (ref 34–44)
HGB BLD CALC-MCNC: 10.8 G/DL — LOW (ref 14–18)
HGB BLD-MCNC: 9.9 G/DL — LOW (ref 12–16)
INR BLD: 1.1 RATIO — SIGNIFICANT CHANGE UP (ref 0.65–1.3)
KETONES UR-MCNC: ABNORMAL
LACTATE BLDV-MCNC: 2.2 MMOL/L — HIGH (ref 0.5–1.6)
LACTATE SERPL-SCNC: 2.3 MMOL/L — HIGH (ref 0.5–2.2)
LEUKOCYTE ESTERASE UR-ACNC: NEGATIVE — SIGNIFICANT CHANGE UP
LIDOCAIN IGE QN: 50 U/L — SIGNIFICANT CHANGE UP (ref 7–60)
LYMPHOCYTES # BLD AUTO: 18.9 % — LOW (ref 20.5–51.1)
LYMPHOCYTES # BLD AUTO: 3.79 K/UL — HIGH (ref 1.2–3.4)
MCHC RBC-ENTMCNC: 17.8 PG — LOW (ref 27–31)
MCHC RBC-ENTMCNC: 29.6 G/DL — LOW (ref 32–37)
MCV RBC AUTO: 60.4 FL — LOW (ref 81–99)
MONOCYTES # BLD AUTO: 1.26 K/UL — HIGH (ref 0.1–0.6)
MONOCYTES NFR BLD AUTO: 6.3 % — SIGNIFICANT CHANGE UP (ref 1.7–9.3)
MYELOCYTES NFR BLD: 0.9 % — HIGH (ref 0–0)
NEUTROPHILS # BLD AUTO: 14.11 K/UL — HIGH (ref 1.4–6.5)
NEUTROPHILS NFR BLD AUTO: 70.3 % — SIGNIFICANT CHANGE UP (ref 42.2–75.2)
NITRITE UR-MCNC: NEGATIVE — SIGNIFICANT CHANGE UP
NRBC # BLD: 0 /100 WBCS — SIGNIFICANT CHANGE UP (ref 0–0)
NT-PROBNP SERPL-SCNC: 1874 PG/ML — HIGH (ref 0–300)
PCO2 BLDV: 41 MMHG — SIGNIFICANT CHANGE UP (ref 41–51)
PH BLDV: 7.38 — SIGNIFICANT CHANGE UP (ref 7.26–7.43)
PH UR: 6 — SIGNIFICANT CHANGE UP (ref 5–8)
PLAT MORPH BLD: NORMAL — SIGNIFICANT CHANGE UP
PLATELET # BLD AUTO: 210 K/UL — SIGNIFICANT CHANGE UP (ref 130–400)
PO2 BLDV: 20 MMHG — SIGNIFICANT CHANGE UP (ref 20–40)
POTASSIUM BLDV-SCNC: 5.2 MMOL/L — SIGNIFICANT CHANGE UP (ref 3.3–5.6)
POTASSIUM SERPL-MCNC: 5.4 MMOL/L — HIGH (ref 3.5–5)
POTASSIUM SERPL-SCNC: 5.4 MMOL/L — HIGH (ref 3.5–5)
PROMYELOCYTES # FLD: 1.8 % — HIGH (ref 0–0)
PROT SERPL-MCNC: 5.8 G/DL — LOW (ref 6–8)
PROT UR-MCNC: 100 MG/DL
PROTHROM AB SERPL-ACNC: 12.6 SEC — SIGNIFICANT CHANGE UP (ref 9.95–12.87)
RBC # BLD: 5.55 M/UL — HIGH (ref 4.2–5.4)
RBC # FLD: 18.1 % — HIGH (ref 11.5–14.5)
RBC BLD AUTO: ABNORMAL
RBC CASTS # UR COMP ASSIST: SIGNIFICANT CHANGE UP /HPF
SAO2 % BLDV: 34 % — SIGNIFICANT CHANGE UP
SODIUM SERPL-SCNC: 130 MMOL/L — LOW (ref 135–146)
SP GR SPEC: 1.02 — SIGNIFICANT CHANGE UP (ref 1.01–1.03)
TROPONIN T SERPL-MCNC: 0.03 NG/ML — CRITICAL HIGH
TYPE + AB SCN PNL BLD: SIGNIFICANT CHANGE UP
UROBILINOGEN FLD QL: 0.2 MG/DL — SIGNIFICANT CHANGE UP (ref 0.2–0.2)
VARIANT LYMPHS # BLD: 0.9 % — SIGNIFICANT CHANGE UP (ref 0–5)
WBC # BLD: 20.07 K/UL — HIGH (ref 4.8–10.8)
WBC # FLD AUTO: 20.07 K/UL — HIGH (ref 4.8–10.8)
WBC UR QL: SIGNIFICANT CHANGE UP /HPF

## 2019-05-10 PROCEDURE — 74177 CT ABD & PELVIS W/CONTRAST: CPT | Mod: 26

## 2019-05-10 PROCEDURE — 78582 LUNG VENTILAT&PERFUS IMAGING: CPT | Mod: 26

## 2019-05-10 PROCEDURE — 93010 ELECTROCARDIOGRAM REPORT: CPT | Mod: 76

## 2019-05-10 PROCEDURE — 71045 X-RAY EXAM CHEST 1 VIEW: CPT | Mod: 26

## 2019-05-10 PROCEDURE — 71275 CT ANGIOGRAPHY CHEST: CPT | Mod: 26

## 2019-05-10 PROCEDURE — 99291 CRITICAL CARE FIRST HOUR: CPT

## 2019-05-10 RX ORDER — ACETAMINOPHEN 500 MG
650 TABLET ORAL ONCE
Refills: 0 | Status: COMPLETED | OUTPATIENT
Start: 2019-05-10 | End: 2019-05-10

## 2019-05-10 RX ORDER — SODIUM CHLORIDE 9 MG/ML
1000 INJECTION, SOLUTION INTRAVENOUS ONCE
Refills: 0 | Status: COMPLETED | OUTPATIENT
Start: 2019-05-10 | End: 2019-05-10

## 2019-05-10 RX ORDER — SODIUM CHLORIDE 9 MG/ML
1000 INJECTION INTRAMUSCULAR; INTRAVENOUS; SUBCUTANEOUS ONCE
Refills: 0 | Status: COMPLETED | OUTPATIENT
Start: 2019-05-10 | End: 2019-05-10

## 2019-05-10 RX ORDER — FAMOTIDINE 10 MG/ML
20 INJECTION INTRAVENOUS ONCE
Refills: 0 | Status: COMPLETED | OUTPATIENT
Start: 2019-05-10 | End: 2019-05-10

## 2019-05-10 RX ADMIN — SODIUM CHLORIDE 1000 MILLILITER(S): 9 INJECTION, SOLUTION INTRAVENOUS at 21:02

## 2019-05-10 RX ADMIN — Medication 10 MILLIGRAM(S): at 23:04

## 2019-05-10 RX ADMIN — SODIUM CHLORIDE 1000 MILLILITER(S): 9 INJECTION INTRAMUSCULAR; INTRAVENOUS; SUBCUTANEOUS at 18:50

## 2019-05-10 RX ADMIN — FAMOTIDINE 20 MILLIGRAM(S): 10 INJECTION INTRAVENOUS at 23:04

## 2019-05-10 RX ADMIN — Medication 650 MILLIGRAM(S): at 21:01

## 2019-05-10 NOTE — ED ADULT NURSE NOTE - NSIMPLEMENTINTERV_GEN_ALL_ED
Implemented All Fall with Harm Risk Interventions:  [place] to call system. Call bell, personal items and telephone within reach. Instruct patient to call for assistance. Room bathroom lighting operational. Non-slip footwear when patient is off stretcher. Physically safe environment: no spills, clutter or unnecessary equipment. Stretcher in lowest position, wheels locked, appropriate side rails in place. Provide visual cue, wrist band, yellow gown, etc. Monitor gait and stability. Monitor for mental status changes and reorient to person, place, and time. Review medications for side effects contributing to fall risk. Reinforce activity limits and safety measures with patient and family. Provide visual clues: red socks.

## 2019-05-10 NOTE — ED ADULT NURSE REASSESSMENT NOTE - NS ED NURSE REASSESS COMMENT FT1
Pt received from main ED to Critical care bed 3 for increased monitor with family at bedside. Pt A&Ox4, reporting SOB. Tachypnea noted, O2 sat 93-95% on RA, pt placed on continuous cardiac monitor and pulse ox, breath sounds present bilateral with crackles noted on the left. 18G right forearm in place,  abx infusing, NS discontinued in the main ED, pt noted to be febrile, Tylenol administered, and 1L LR started slowly. Pt pending VQ scan and CT chest. Pt transported to NM for VQ scan with family at bedside.

## 2019-05-10 NOTE — ED PROVIDER NOTE - PHYSICAL EXAMINATION
patient appears uncomfortable and dyspneic  Lungs: B/L good air entry, LLL crackles  Heart: S1, s2, rrr, no m/g  abd: +BS, +Lt flank tenderness, ND, soft  back: generalized mid line tenderness, h/o chronic back pain  B/L lower extremities have no edema, no calf tenderness

## 2019-05-10 NOTE — ED PROVIDER NOTE - PROGRESS NOTE DETAILS
Discussed with Critical care team, patient is upgraded to critical care due to elevated BNP and troponin, with Lt sided infiltrate on CXR, low GFR, anemia/low Hb, high d-dimer and Leukocytosis; abx ordered, VQ scan and abd CT ordered. bedside ultrasound showed L. sided b lines. no pleural effusions.  no pericardial effusion. no r. heart strain. Sepsis suspected at 2050.  IVF bolus cannot be given due to: (x) CHF, CRF. Abx given. Cultures sent. CTA obtained to r/o PE in light of abnormal VS, and lab/imaging findings. Will repeat lactate and admit. I, Dr. Ruggiero, received signout from Dr. Fung. Pending completion of eval for PE vs PNA vs other acute pathology as cause for SOB, left chest pain, and elevated WBC ct, troponin, bnp and d-dimer. as well as DINO.

## 2019-05-10 NOTE — ED PROVIDER NOTE - CARE PLAN
Principal Discharge DX:	Pneumonia  Secondary Diagnosis:	Chest pain  Secondary Diagnosis:	SOB (shortness of breath)  Secondary Diagnosis:	Sepsis  Secondary Diagnosis:	Weakness

## 2019-05-10 NOTE — ED ADULT NURSE NOTE - OBJECTIVE STATEMENT
Patient fasting for Ramadon and had syncopal episode while sitting ing chair, -head trauma,- anticoagulation. LOC lasting about 1 minute and patient lost bladder and bowel function during this time. Patient denies CP N/V and urinary symptoms.

## 2019-05-10 NOTE — ED PROVIDER NOTE - CLINICAL SUMMARY MEDICAL DECISION MAKING FREE TEXT BOX
pw SOB, chest pain, With time, found to have indicators of Severe Sepsis, DINO, and of possible PE, CHF, and cardiac strain. W/u showed PNA and severe sepsis. Patient to be admitted to an inpatient telemetry floor. Case discussed with and care endorsed to medical admitting resident. Admitting physician notified.

## 2019-05-10 NOTE — ED PROVIDER NOTE - OBJECTIVE STATEMENT
Patient is BIB family for evaluation of near syncope associated with dizziness. Patient and family states that two days ago patient started getting sick, started having Lt sided chest pain with sob and LT sided flank pain, which continued, +generalized weakness, +fever of 101F, denies nasal congestion, denies cough, no diarrhea, no rash, no travel. Patient started feeling dizzy, with generalized weakness and has no energy, feels tired and weak, today almost passed out, no falls, so was brought to ED. Patient appears dyspneic while at rest, and worse with exertion, no lower ext swelling, no sick contacts.

## 2019-05-11 LAB
ANISOCYTOSIS BLD QL: SLIGHT — SIGNIFICANT CHANGE UP
CK MB CFR SERPL CALC: <1 NG/ML — SIGNIFICANT CHANGE UP (ref 0.6–6.3)
CK SERPL-CCNC: 68 U/L — SIGNIFICANT CHANGE UP (ref 0–225)
GAS PNL BLDV: SIGNIFICANT CHANGE UP
GIANT PLATELETS BLD QL SMEAR: PRESENT — SIGNIFICANT CHANGE UP
GLUCOSE BLDC GLUCOMTR-MCNC: 258 MG/DL — HIGH (ref 70–99)
GLUCOSE BLDC GLUCOMTR-MCNC: 274 MG/DL — HIGH (ref 70–99)
GLUCOSE BLDC GLUCOMTR-MCNC: 285 MG/DL — HIGH (ref 70–99)
GLUCOSE BLDC GLUCOMTR-MCNC: 337 MG/DL — HIGH (ref 70–99)
HYPOCHROMIA BLD QL: SLIGHT — SIGNIFICANT CHANGE UP
MICROCYTES BLD QL: SLIGHT — SIGNIFICANT CHANGE UP
POIKILOCYTOSIS BLD QL AUTO: SLIGHT — SIGNIFICANT CHANGE UP
POLYCHROMASIA BLD QL SMEAR: SLIGHT — SIGNIFICANT CHANGE UP
SMUDGE CELLS # BLD: PRESENT — SIGNIFICANT CHANGE UP
TROPONIN T SERPL-MCNC: 0.02 NG/ML — HIGH

## 2019-05-11 PROCEDURE — 93306 TTE W/DOPPLER COMPLETE: CPT | Mod: 26

## 2019-05-11 RX ORDER — VANCOMYCIN HCL 1 G
VIAL (EA) INTRAVENOUS
Refills: 0 | Status: DISCONTINUED | OUTPATIENT
Start: 2019-05-11 | End: 2019-05-12

## 2019-05-11 RX ORDER — INSULIN DETEMIR 100/ML (3)
0 INSULIN PEN (ML) SUBCUTANEOUS
Qty: 15 | Refills: 0 | DISCHARGE

## 2019-05-11 RX ORDER — FUROSEMIDE 40 MG
0 TABLET ORAL
Qty: 30 | Refills: 0 | DISCHARGE

## 2019-05-11 RX ORDER — SITAGLIPTIN 50 MG/1
1 TABLET, FILM COATED ORAL
Qty: 0 | Refills: 0 | DISCHARGE

## 2019-05-11 RX ORDER — ISOSORBIDE MONONITRATE 60 MG/1
30 TABLET, EXTENDED RELEASE ORAL DAILY
Refills: 0 | Status: DISCONTINUED | OUTPATIENT
Start: 2019-05-11 | End: 2019-05-17

## 2019-05-11 RX ORDER — ATORVASTATIN CALCIUM 80 MG/1
0 TABLET, FILM COATED ORAL
Qty: 90 | Refills: 0 | DISCHARGE

## 2019-05-11 RX ORDER — ATORVASTATIN CALCIUM 80 MG/1
40 TABLET, FILM COATED ORAL AT BEDTIME
Refills: 0 | Status: DISCONTINUED | OUTPATIENT
Start: 2019-05-11 | End: 2019-05-17

## 2019-05-11 RX ORDER — PANTOPRAZOLE SODIUM 20 MG/1
40 TABLET, DELAYED RELEASE ORAL
Refills: 0 | Status: DISCONTINUED | OUTPATIENT
Start: 2019-05-11 | End: 2019-05-17

## 2019-05-11 RX ORDER — OMEPRAZOLE 10 MG/1
0 CAPSULE, DELAYED RELEASE ORAL
Qty: 90 | Refills: 0 | DISCHARGE

## 2019-05-11 RX ORDER — AZTREONAM 2 G
VIAL (EA) INJECTION
Refills: 0 | Status: DISCONTINUED | OUTPATIENT
Start: 2019-05-11 | End: 2019-05-12

## 2019-05-11 RX ORDER — SODIUM CHLORIDE 9 MG/ML
1000 INJECTION INTRAMUSCULAR; INTRAVENOUS; SUBCUTANEOUS
Refills: 0 | Status: DISCONTINUED | OUTPATIENT
Start: 2019-05-11 | End: 2019-05-11

## 2019-05-11 RX ORDER — VANCOMYCIN HCL 1 G
VIAL (EA) INTRAVENOUS
Refills: 0 | Status: DISCONTINUED | OUTPATIENT
Start: 2019-05-11 | End: 2019-05-11

## 2019-05-11 RX ORDER — INSULIN DETEMIR 100/ML (3)
30 INSULIN PEN (ML) SUBCUTANEOUS
Refills: 0 | Status: DISCONTINUED | OUTPATIENT
Start: 2019-05-11 | End: 2019-05-11

## 2019-05-11 RX ORDER — OMEPRAZOLE 10 MG/1
1 CAPSULE, DELAYED RELEASE ORAL
Qty: 0 | Refills: 0 | DISCHARGE

## 2019-05-11 RX ORDER — LOSARTAN POTASSIUM 100 MG/1
50 TABLET, FILM COATED ORAL DAILY
Refills: 0 | Status: DISCONTINUED | OUTPATIENT
Start: 2019-05-11 | End: 2019-05-13

## 2019-05-11 RX ORDER — FUROSEMIDE 40 MG
1 TABLET ORAL
Qty: 0 | Refills: 0 | DISCHARGE

## 2019-05-11 RX ORDER — INSULIN GLARGINE 100 [IU]/ML
30 INJECTION, SOLUTION SUBCUTANEOUS
Refills: 0 | Status: DISCONTINUED | OUTPATIENT
Start: 2019-05-11 | End: 2019-05-13

## 2019-05-11 RX ORDER — RANITIDINE HYDROCHLORIDE 150 MG/1
0 TABLET, FILM COATED ORAL
Qty: 180 | Refills: 0 | DISCHARGE

## 2019-05-11 RX ORDER — CLOPIDOGREL BISULFATE 75 MG/1
1 TABLET, FILM COATED ORAL
Qty: 0 | Refills: 0 | DISCHARGE

## 2019-05-11 RX ORDER — ATORVASTATIN CALCIUM 80 MG/1
1 TABLET, FILM COATED ORAL
Qty: 0 | Refills: 0 | DISCHARGE

## 2019-05-11 RX ORDER — DOCUSATE SODIUM 100 MG
100 CAPSULE ORAL THREE TIMES A DAY
Refills: 0 | Status: DISCONTINUED | OUTPATIENT
Start: 2019-05-11 | End: 2019-05-17

## 2019-05-11 RX ORDER — POLYETHYLENE GLYCOL 3350 17 G/17G
17 POWDER, FOR SOLUTION ORAL DAILY
Refills: 0 | Status: DISCONTINUED | OUTPATIENT
Start: 2019-05-11 | End: 2019-05-15

## 2019-05-11 RX ORDER — VANCOMYCIN HCL 1 G
1000 VIAL (EA) INTRAVENOUS EVERY 24 HOURS
Refills: 0 | Status: DISCONTINUED | OUTPATIENT
Start: 2019-05-12 | End: 2019-05-12

## 2019-05-11 RX ORDER — CLOPIDOGREL BISULFATE 75 MG/1
75 TABLET, FILM COATED ORAL DAILY
Refills: 0 | Status: DISCONTINUED | OUTPATIENT
Start: 2019-05-11 | End: 2019-05-17

## 2019-05-11 RX ORDER — APIXABAN 2.5 MG/1
0 TABLET, FILM COATED ORAL
Qty: 120 | Refills: 0 | DISCHARGE

## 2019-05-11 RX ORDER — INSULIN DETEMIR 100/ML (3)
30 INSULIN PEN (ML) SUBCUTANEOUS
Qty: 0 | Refills: 0 | DISCHARGE

## 2019-05-11 RX ORDER — METFORMIN HYDROCHLORIDE 850 MG/1
0 TABLET ORAL
Qty: 90 | Refills: 0 | DISCHARGE

## 2019-05-11 RX ORDER — AZTREONAM 2 G
1000 VIAL (EA) INJECTION ONCE
Refills: 0 | Status: COMPLETED | OUTPATIENT
Start: 2019-05-11 | End: 2019-05-11

## 2019-05-11 RX ORDER — ISOSORBIDE MONONITRATE 60 MG/1
0 TABLET, EXTENDED RELEASE ORAL
Qty: 180 | Refills: 0 | DISCHARGE

## 2019-05-11 RX ORDER — GABAPENTIN 400 MG/1
400 CAPSULE ORAL THREE TIMES A DAY
Refills: 0 | Status: DISCONTINUED | OUTPATIENT
Start: 2019-05-11 | End: 2019-05-17

## 2019-05-11 RX ORDER — AZTREONAM 2 G
1000 VIAL (EA) INJECTION EVERY 8 HOURS
Refills: 0 | Status: DISCONTINUED | OUTPATIENT
Start: 2019-05-11 | End: 2019-05-12

## 2019-05-11 RX ORDER — OXYCODONE HYDROCHLORIDE 5 MG/1
5 TABLET ORAL THREE TIMES A DAY
Refills: 0 | Status: DISCONTINUED | OUTPATIENT
Start: 2019-05-11 | End: 2019-05-17

## 2019-05-11 RX ORDER — VANCOMYCIN HCL 1 G
1000 VIAL (EA) INTRAVENOUS ONCE
Refills: 0 | Status: COMPLETED | OUTPATIENT
Start: 2019-05-11 | End: 2019-05-11

## 2019-05-11 RX ORDER — SODIUM CHLORIDE 9 MG/ML
1000 INJECTION INTRAMUSCULAR; INTRAVENOUS; SUBCUTANEOUS
Refills: 0 | Status: DISCONTINUED | OUTPATIENT
Start: 2019-05-11 | End: 2019-05-13

## 2019-05-11 RX ORDER — SENNA PLUS 8.6 MG/1
2 TABLET ORAL AT BEDTIME
Refills: 0 | Status: DISCONTINUED | OUTPATIENT
Start: 2019-05-11 | End: 2019-05-17

## 2019-05-11 RX ORDER — IBANDRONATE SODIUM 150 MG/1
0 TABLET ORAL
Qty: 1 | Refills: 0 | DISCHARGE

## 2019-05-11 RX ORDER — CLOPIDOGREL BISULFATE 75 MG/1
0 TABLET, FILM COATED ORAL
Qty: 90 | Refills: 0 | DISCHARGE

## 2019-05-11 RX ORDER — ACETAMINOPHEN 500 MG
650 TABLET ORAL EVERY 6 HOURS
Refills: 0 | Status: DISCONTINUED | OUTPATIENT
Start: 2019-05-11 | End: 2019-05-17

## 2019-05-11 RX ORDER — GABAPENTIN 400 MG/1
0 CAPSULE ORAL
Qty: 90 | Refills: 0 | DISCHARGE

## 2019-05-11 RX ORDER — HEPARIN SODIUM 5000 [USP'U]/ML
5000 INJECTION INTRAVENOUS; SUBCUTANEOUS EVERY 8 HOURS
Refills: 0 | Status: DISCONTINUED | OUTPATIENT
Start: 2019-05-11 | End: 2019-05-17

## 2019-05-11 RX ORDER — METOPROLOL TARTRATE 50 MG
25 TABLET ORAL
Refills: 0 | Status: DISCONTINUED | OUTPATIENT
Start: 2019-05-11 | End: 2019-05-17

## 2019-05-11 RX ADMIN — Medication 250 MILLIGRAM(S): at 09:38

## 2019-05-11 RX ADMIN — PANTOPRAZOLE SODIUM 40 MILLIGRAM(S): 20 TABLET, DELAYED RELEASE ORAL at 06:08

## 2019-05-11 RX ADMIN — GABAPENTIN 400 MILLIGRAM(S): 400 CAPSULE ORAL at 13:06

## 2019-05-11 RX ADMIN — INSULIN GLARGINE 30 UNIT(S): 100 INJECTION, SOLUTION SUBCUTANEOUS at 09:36

## 2019-05-11 RX ADMIN — Medication 100 MILLIGRAM(S): at 06:09

## 2019-05-11 RX ADMIN — SODIUM CHLORIDE 70 MILLILITER(S): 9 INJECTION INTRAMUSCULAR; INTRAVENOUS; SUBCUTANEOUS at 07:28

## 2019-05-11 RX ADMIN — SENNA PLUS 2 TABLET(S): 8.6 TABLET ORAL at 22:17

## 2019-05-11 RX ADMIN — Medication 5 MILLIGRAM(S): at 06:07

## 2019-05-11 RX ADMIN — Medication 100 MILLIGRAM(S): at 22:17

## 2019-05-11 RX ADMIN — ATORVASTATIN CALCIUM 40 MILLIGRAM(S): 80 TABLET, FILM COATED ORAL at 22:17

## 2019-05-11 RX ADMIN — LOSARTAN POTASSIUM 50 MILLIGRAM(S): 100 TABLET, FILM COATED ORAL at 06:06

## 2019-05-11 RX ADMIN — Medication 5 MILLIGRAM(S): at 13:05

## 2019-05-11 RX ADMIN — SODIUM CHLORIDE 100 MILLILITER(S): 9 INJECTION INTRAMUSCULAR; INTRAVENOUS; SUBCUTANEOUS at 06:04

## 2019-05-11 RX ADMIN — ISOSORBIDE MONONITRATE 30 MILLIGRAM(S): 60 TABLET, EXTENDED RELEASE ORAL at 12:57

## 2019-05-11 RX ADMIN — INSULIN GLARGINE 30 UNIT(S): 100 INJECTION, SOLUTION SUBCUTANEOUS at 22:16

## 2019-05-11 RX ADMIN — SODIUM CHLORIDE 70 MILLILITER(S): 9 INJECTION INTRAMUSCULAR; INTRAVENOUS; SUBCUTANEOUS at 21:00

## 2019-05-11 RX ADMIN — CLOPIDOGREL BISULFATE 75 MILLIGRAM(S): 75 TABLET, FILM COATED ORAL at 12:56

## 2019-05-11 RX ADMIN — Medication 50 MILLIGRAM(S): at 10:55

## 2019-05-11 RX ADMIN — HEPARIN SODIUM 5000 UNIT(S): 5000 INJECTION INTRAVENOUS; SUBCUTANEOUS at 06:06

## 2019-05-11 RX ADMIN — Medication 5 MILLIGRAM(S): at 22:17

## 2019-05-11 RX ADMIN — GABAPENTIN 400 MILLIGRAM(S): 400 CAPSULE ORAL at 22:17

## 2019-05-11 RX ADMIN — Medication 650 MILLIGRAM(S): at 13:00

## 2019-05-11 RX ADMIN — Medication 25 MILLIGRAM(S): at 06:06

## 2019-05-11 RX ADMIN — GABAPENTIN 400 MILLIGRAM(S): 400 CAPSULE ORAL at 06:06

## 2019-05-11 RX ADMIN — Medication 100 MILLIGRAM(S): at 13:05

## 2019-05-11 RX ADMIN — Medication 25 MILLIGRAM(S): at 17:59

## 2019-05-11 RX ADMIN — Medication 50 MILLIGRAM(S): at 22:17

## 2019-05-11 RX ADMIN — Medication 650 MILLIGRAM(S): at 01:03

## 2019-05-11 RX ADMIN — HEPARIN SODIUM 5000 UNIT(S): 5000 INJECTION INTRAVENOUS; SUBCUTANEOUS at 22:17

## 2019-05-11 RX ADMIN — HEPARIN SODIUM 5000 UNIT(S): 5000 INJECTION INTRAVENOUS; SUBCUTANEOUS at 13:06

## 2019-05-11 NOTE — H&P ADULT - NSHPPHYSICALEXAM_GEN_ALL_CORE
Constitutional: non-toxic,  not in acute distress  HEENT: eomi,  wnl  Respiratory:  clear R lung fields,  crackles over L lung fields. no wheezing.   Cardiovascular:  s1, s2,  regular, no murmurs  Gastrointestinal:  soft, nontender but uncomfortable, no guarding, nondistended, +bowel sounds (delayed)  Extremities: no edema b/l le  Neurological: nonfocal; wnl, aaox3    ------------------------------------------------------------------     VITAL SIGNS   T(F): 101.1 (05-11 @ 00:23), Max: 103 (05-10 @ 20:49)  HR: 89 (05-11 @ 00:52)  BP: 160/73 (05-11 @ 00:52)  RR: 20 (05-11 @ 00:23)  SpO2: 97% (05-11 @ 01:58)

## 2019-05-11 NOTE — H&P ADULT - HISTORY OF PRESENT ILLNESS
67 yo female patient with PMHx HTN, DM II, CAD/CABG x2 (last in 2013), DL, RA on chronic prednisone 15mg daily c/b osteoporosis and vertebral fractures s/p kyphoplasty surgeries (last done 10/'18) -- presents to hospital for fevers / sob x 1d.     Pt states her symptoms of sob started ~1d ptp,  a/w malaise and fever.   She does not recall having any rhinorrhea / sore throat / mm aches (does have chr back pain) / cough / sick contacts.  She is poorly ambulatory due to her back pain, and cannot lay flat due to her back, but does not report increased le swelling / sob on exertion prior to past 1-2 days.      She also reports of increased abd distention for several weeks-months, with poorly localized abd pain / pressure that radiates to L chest on day of presentation.   The abd pain is dull in nature and pressure like,  mildly alleviated with flatus.  She has not had bowel movement in 3 days,  but states she usually can go daily.  She takes opoid meds for her back pain but has not been taking her lactulose. 67 yo female patient with PMHx HTN, DM II, CAD/CABG x2 (last in 2013), DL, RA on chronic prednisone 15mg daily c/b osteoporosis and vertebral fractures s/p kyphoplasty surgeries (last done 10/'18) -- presents to hospital for fevers / sob x 1d.      Pt states her symptoms of sob started ~1d ptp,  a/w malaise and fever.   She does not recall having any rhinorrhea / sore throat / mm aches (does have chr back pain) / cough / sick contacts.  She is poorly ambulatory due to her back pain, and cannot lay flat due to her back, but does not report increased le swelling / sob on exertion prior to past 1-2 days.      She also reports of increased abd distention for several weeks-months, with poorly localized abd pain / pressure that radiates to L chest on day of presentation.   The abd pain is dull in nature and pressure like,  mildly alleviated with flatus.  She has not had bowel movement in 3 days,  but states she usually can go daily.  She takes opoid meds for her back pain but has not been taking her lactulose.

## 2019-05-11 NOTE — PHARMACOTHERAPY INTERVENTION NOTE - COMMENTS
Vacomycin 1 gram every 12 hours. GFR=33 ml/min/m2, therefore, doctor Marco was contacted to reduce frequency.

## 2019-05-11 NOTE — H&P ADULT - NSICDXPASTMEDICALHX_GEN_ALL_CORE_FT
PAST MEDICAL HISTORY:  Anemia     CAD (coronary artery disease)     Diabetes     Dyslipidemia     HTN (hypertension)     Osteoporosis     Rheumatoid arthritis

## 2019-05-11 NOTE — H&P ADULT - ATTENDING COMMENTS
68 year old female with hx of DMII, CAD s/p distant CABG/PCI, and RA on chronic steroids presented with fevers associated with dyspnea.   Patient was found to have severe sepsis on admission due to suspected gram negative pneumonia.     PHYSICAL EXAM:  GENERAL: NAD, well-developed  HEAD:  Atraumatic, Normocephalic  EYES: EOMI, PERRLA, conjunctiva and sclera clear  NECK: Supple, No JVD  CHEST/LUNG: Crackles bilateral, L>R  HEART: Regular rate and rhythm; No murmurs, rubs, or gallops  ABDOMEN: Soft, Nontender, Nondistended; Bowel sounds present  EXTREMITIES:  2+ Peripheral Pulses, No clubbing, cyanosis, or edema  PSYCH: AAOx3  NEUROLOGY: non-focal  SKIN: No rashes or lesions    A/P:   #Severe Sepsis POA (leukocytosis, fevers, DINO)   #Suspected gram negative pneumonia   - Start broad spectrum abx given immunosuppressive state   - MRSA nares, dc vanco if negative   - obtain sputum culture   - f/u blood cultures   - 2D Echo   - ID consult     #Chronic Anemia - microcytic, obtain iron studies     #DINO on CKDIII - c/w IVF, repeat BMP  #AG metabolic acidosis - due to lactic acidosis and DINO   #Mild hyponatremia - likely hypovolemic   #Mild hyperkalemia - repeat BMP     #CAD s/p distant CABG/PCI - troponin positive, likely type II MI, c/w Plavix, Statin, BB, ACE, Imdur     #RA on prednisone - controlled     #Compression fractures due to chronic steroid therapy s/p kyphoplasty  #Malnutrition - nutrition eval 68 year old female with hx of DMII, CAD s/p distant CABG/PCI, and RA on chronic steroids presented with fevers associated with dyspnea.   Patient was found to have severe sepsis on admission due to suspected gram negative pneumonia.     PHYSICAL EXAM:  GENERAL: NAD, well-developed  HEAD:  Atraumatic, Normocephalic  EYES: EOMI, PERRLA, conjunctiva and sclera clear  NECK: Supple, No JVD  CHEST/LUNG: Crackles bilateral, L>R  HEART: Regular rate and rhythm; No murmurs, rubs, or gallops  ABDOMEN: Soft, Nontender, Nondistended; Bowel sounds present  EXTREMITIES:  2+ Peripheral Pulses, No clubbing, cyanosis, or edema  PSYCH: AAOx3  NEUROLOGY: non-focal  SKIN: No rashes or lesions    A/P:   #Severe Sepsis POA (leukocytosis, fevers, DINO)   #Suspected gram negative pneumonia   #Metabolic encephalopathy   - Start broad spectrum abx given immunosuppressive state   - MRSA nares, dc vanco if negative   - obtain sputum culture   - f/u blood cultures   - 2D Echo   - ID consult     #Chronic Anemia - microcytic, obtain iron studies     #DINO on CKDIII - c/w IVF, repeat BMP  #AG metabolic acidosis - due to lactic acidosis and DINO   #Mild hyponatremia - likely hypovolemic   #Mild hyperkalemia - repeat BMP     #CAD s/p distant CABG/PCI - troponin positive, likely type II MI, c/w Plavix, Statin, BB, ACE, Imdur     #RA on prednisone (daughter stating patient takes 10mg BID at home, however unsure of the dose and not on PCP ppx      #Compression fractures due to chronic steroid therapy s/p kyphoplasty  #Malnutrition - nutrition eval 68 year old female with hx of DMII, CAD s/p distant CABG/PCI, and PMR on chronic steroids presented with fevers associated with dyspnea.   Patient was found to have severe sepsis on admission due to suspected gram negative pneumonia.     PHYSICAL EXAM:  GENERAL: NAD, well-developed  HEAD:  Atraumatic, Normocephalic  EYES: EOMI, PERRLA, conjunctiva and sclera clear  NECK: Supple, No JVD  CHEST/LUNG: Crackles bilateral, L>R  HEART: Regular rate and rhythm; No murmurs, rubs, or gallops  ABDOMEN: Soft, Nontender, Nondistended; Bowel sounds present  EXTREMITIES:  2+ Peripheral Pulses, No clubbing, cyanosis, or edema  PSYCH: AAOx3  NEUROLOGY: non-focal  SKIN: No rashes or lesions    A/P:   #Severe Sepsis POA (leukocytosis, fevers, DINO)   #Suspected gram negative pneumonia   #Metabolic encephalopathy   - Start broad spectrum abx given immunosuppressive state   - MRSA nares, dc vanco if negative   - obtain sputum culture   - f/u blood cultures   - 2D Echo   - ID consult     #Chronic Anemia - microcytic, obtain iron studies     #DINO on CKDIII - c/w IVF, repeat BMP  #AG metabolic acidosis - due to lactic acidosis and DINO   #Mild hyponatremia - likely hypovolemic   #Mild hyperkalemia - repeat BMP     #CAD s/p distant CABG/PCI - troponin positive, likely type II MI, c/w Plavix, Statin, BB, ACE, Imdur     #PMR on prednisone (daughter stating patient takes 10mg vs 5mg BID at home, however unsure of the dose and not on PCP ppx) - hyponatremic, hypokalemic, possible component of adrenal insufficiency? Also, might have developed steroid induced myopathy given worsening proximal weakness  - PT eval     #Compression fractures due to chronic steroid therapy s/p kyphoplasty  #Malnutrition - nutrition eval

## 2019-05-11 NOTE — H&P ADULT - ASSESSMENT
67 yo female patient with PMHx HTN, DM II, CAD/CABG x2 (last in 2013), DL, RA on chronic prednisone 15mg daily c/b osteoporosis and vertebral fractures s/p kyphoplasty surgeries (last done 10/'18) -- presents to hospital for fevers / sob x 1d, along with abd distention/discomfort/constipation.    Found to have Tmax 103, MERLYN PNA on CT, with WBC 20 on initial presentation, and admitted for sepsis / pna.    SEPSIS LIKELY 2/2 CAP  -- o2 prn  -- check bcx, sputum gs  -- iv abx:  rocephin + azithromycin    CONSTIPATION / ABD PAIN  --  start on bowel reg: senna / colace / miralax  --  simethicone for gas rel. pains.  --  ct abd revealed no acute pathology and physical exam was reassuring without guarding / peritoneal signs.     DINO VS CKD PROGRESSION  --  gentle hydration  --  trend bmp  --  check u lytes / cr    TROPONEMIA  -- possibly from dino/ckd/demand dt sepsis;  unlikely from acs  -- currently: 0.03  (previously 0.04, 0.07,  0.05 in 2018)    Rheumatoid arthritis: cw prednisone   Dyslipidemia: cw statin  HTN (hypertension): cw losartan, imdur,   CAD (coronary artery disease): cw plavix, arb, imdur, statin, bb  Diabetes: on levemir 30 bid, can cw same dose  DVT PPX

## 2019-05-11 NOTE — H&P ADULT - NSICDXFAMILYHX_GEN_ALL_CORE_FT
FAMILY HISTORY:  Father  Still living? No  Family history of coronary artery disease, Age at diagnosis: Age Unknown    Sibling  Still living? Yes, Estimated age: Age Unknown  Family history of thalassemia, Age at diagnosis: Age Unknown

## 2019-05-12 LAB
ANION GAP SERPL CALC-SCNC: 13 MMOL/L — SIGNIFICANT CHANGE UP (ref 7–14)
BASOPHILS # BLD AUTO: 0.01 K/UL — SIGNIFICANT CHANGE UP (ref 0–0.2)
BASOPHILS NFR BLD AUTO: 0.1 % — SIGNIFICANT CHANGE UP (ref 0–1)
BUN SERPL-MCNC: 22 MG/DL — HIGH (ref 10–20)
CALCIUM SERPL-MCNC: 8.1 MG/DL — LOW (ref 8.5–10.1)
CHLORIDE SERPL-SCNC: 98 MMOL/L — SIGNIFICANT CHANGE UP (ref 98–110)
CO2 SERPL-SCNC: 20 MMOL/L — SIGNIFICANT CHANGE UP (ref 17–32)
CREAT SERPL-MCNC: 1.6 MG/DL — HIGH (ref 0.7–1.5)
EOSINOPHIL # BLD AUTO: 0 K/UL — SIGNIFICANT CHANGE UP (ref 0–0.7)
EOSINOPHIL NFR BLD AUTO: 0 % — SIGNIFICANT CHANGE UP (ref 0–8)
GLUCOSE BLDC GLUCOMTR-MCNC: 182 MG/DL — HIGH (ref 70–99)
GLUCOSE BLDC GLUCOMTR-MCNC: 234 MG/DL — HIGH (ref 70–99)
GLUCOSE BLDC GLUCOMTR-MCNC: 259 MG/DL — HIGH (ref 70–99)
GLUCOSE BLDC GLUCOMTR-MCNC: 297 MG/DL — HIGH (ref 70–99)
GLUCOSE SERPL-MCNC: 250 MG/DL — HIGH (ref 70–99)
HCT VFR BLD CALC: 24.7 % — LOW (ref 37–47)
HGB BLD-MCNC: 7.5 G/DL — LOW (ref 12–16)
IMM GRANULOCYTES NFR BLD AUTO: 1 % — HIGH (ref 0.1–0.3)
LYMPHOCYTES # BLD AUTO: 1.65 K/UL — SIGNIFICANT CHANGE UP (ref 1.2–3.4)
LYMPHOCYTES # BLD AUTO: 13.1 % — LOW (ref 20.5–51.1)
MAGNESIUM SERPL-MCNC: 2 MG/DL — SIGNIFICANT CHANGE UP (ref 1.8–2.4)
MCHC RBC-ENTMCNC: 17.9 PG — LOW (ref 27–31)
MCHC RBC-ENTMCNC: 30.4 G/DL — LOW (ref 32–37)
MCV RBC AUTO: 58.8 FL — LOW (ref 81–99)
MONOCYTES # BLD AUTO: 0.84 K/UL — HIGH (ref 0.1–0.6)
MONOCYTES NFR BLD AUTO: 6.7 % — SIGNIFICANT CHANGE UP (ref 1.7–9.3)
NEUTROPHILS # BLD AUTO: 9.97 K/UL — HIGH (ref 1.4–6.5)
NEUTROPHILS NFR BLD AUTO: 79.1 % — HIGH (ref 42.2–75.2)
NRBC # BLD: 0 /100 WBCS — SIGNIFICANT CHANGE UP (ref 0–0)
PLATELET # BLD AUTO: 188 K/UL — SIGNIFICANT CHANGE UP (ref 130–400)
POTASSIUM SERPL-MCNC: 4.5 MMOL/L — SIGNIFICANT CHANGE UP (ref 3.5–5)
POTASSIUM SERPL-SCNC: 4.5 MMOL/L — SIGNIFICANT CHANGE UP (ref 3.5–5)
RBC # BLD: 4.2 M/UL — SIGNIFICANT CHANGE UP (ref 4.2–5.4)
RBC # FLD: 17 % — HIGH (ref 11.5–14.5)
SODIUM SERPL-SCNC: 131 MMOL/L — LOW (ref 135–146)
WBC # BLD: 12.59 K/UL — HIGH (ref 4.8–10.8)
WBC # FLD AUTO: 12.59 K/UL — HIGH (ref 4.8–10.8)

## 2019-05-12 PROCEDURE — 71045 X-RAY EXAM CHEST 1 VIEW: CPT | Mod: 26

## 2019-05-12 RX ORDER — CEFEPIME 1 G/1
2000 INJECTION, POWDER, FOR SOLUTION INTRAMUSCULAR; INTRAVENOUS EVERY 12 HOURS
Refills: 0 | Status: DISCONTINUED | OUTPATIENT
Start: 2019-05-12 | End: 2019-05-16

## 2019-05-12 RX ORDER — CEFEPIME 1 G/1
INJECTION, POWDER, FOR SOLUTION INTRAMUSCULAR; INTRAVENOUS
Refills: 0 | Status: DISCONTINUED | OUTPATIENT
Start: 2019-05-12 | End: 2019-05-16

## 2019-05-12 RX ORDER — CEFEPIME 1 G/1
2000 INJECTION, POWDER, FOR SOLUTION INTRAMUSCULAR; INTRAVENOUS ONCE
Refills: 0 | Status: COMPLETED | OUTPATIENT
Start: 2019-05-12 | End: 2019-05-12

## 2019-05-12 RX ADMIN — INSULIN GLARGINE 30 UNIT(S): 100 INJECTION, SOLUTION SUBCUTANEOUS at 21:17

## 2019-05-12 RX ADMIN — PANTOPRAZOLE SODIUM 40 MILLIGRAM(S): 20 TABLET, DELAYED RELEASE ORAL at 06:09

## 2019-05-12 RX ADMIN — Medication 5 MILLIGRAM(S): at 21:18

## 2019-05-12 RX ADMIN — SENNA PLUS 2 TABLET(S): 8.6 TABLET ORAL at 21:19

## 2019-05-12 RX ADMIN — ATORVASTATIN CALCIUM 40 MILLIGRAM(S): 80 TABLET, FILM COATED ORAL at 21:17

## 2019-05-12 RX ADMIN — Medication 100 MILLIGRAM(S): at 21:18

## 2019-05-12 RX ADMIN — HEPARIN SODIUM 5000 UNIT(S): 5000 INJECTION INTRAVENOUS; SUBCUTANEOUS at 06:10

## 2019-05-12 RX ADMIN — ISOSORBIDE MONONITRATE 30 MILLIGRAM(S): 60 TABLET, EXTENDED RELEASE ORAL at 13:06

## 2019-05-12 RX ADMIN — HEPARIN SODIUM 5000 UNIT(S): 5000 INJECTION INTRAVENOUS; SUBCUTANEOUS at 21:16

## 2019-05-12 RX ADMIN — Medication 650 MILLIGRAM(S): at 00:54

## 2019-05-12 RX ADMIN — HEPARIN SODIUM 5000 UNIT(S): 5000 INJECTION INTRAVENOUS; SUBCUTANEOUS at 13:06

## 2019-05-12 RX ADMIN — CLOPIDOGREL BISULFATE 75 MILLIGRAM(S): 75 TABLET, FILM COATED ORAL at 13:05

## 2019-05-12 RX ADMIN — Medication 25 MILLIGRAM(S): at 21:17

## 2019-05-12 RX ADMIN — LOSARTAN POTASSIUM 50 MILLIGRAM(S): 100 TABLET, FILM COATED ORAL at 06:11

## 2019-05-12 RX ADMIN — Medication 100 MILLIGRAM(S): at 06:07

## 2019-05-12 RX ADMIN — Medication 50 MILLIGRAM(S): at 06:11

## 2019-05-12 RX ADMIN — POLYETHYLENE GLYCOL 3350 17 GRAM(S): 17 POWDER, FOR SOLUTION ORAL at 13:06

## 2019-05-12 RX ADMIN — Medication 5 MILLIGRAM(S): at 06:09

## 2019-05-12 RX ADMIN — Medication 25 MILLIGRAM(S): at 06:07

## 2019-05-12 RX ADMIN — Medication 650 MILLIGRAM(S): at 06:12

## 2019-05-12 RX ADMIN — Medication 5 MILLIGRAM(S): at 13:07

## 2019-05-12 RX ADMIN — CEFEPIME 100 MILLIGRAM(S): 1 INJECTION, POWDER, FOR SOLUTION INTRAMUSCULAR; INTRAVENOUS at 21:16

## 2019-05-12 RX ADMIN — Medication 100 MILLIGRAM(S): at 13:06

## 2019-05-12 RX ADMIN — GABAPENTIN 400 MILLIGRAM(S): 400 CAPSULE ORAL at 21:17

## 2019-05-12 RX ADMIN — CEFEPIME 100 MILLIGRAM(S): 1 INJECTION, POWDER, FOR SOLUTION INTRAMUSCULAR; INTRAVENOUS at 12:25

## 2019-05-12 RX ADMIN — INSULIN GLARGINE 30 UNIT(S): 100 INJECTION, SOLUTION SUBCUTANEOUS at 08:33

## 2019-05-12 RX ADMIN — GABAPENTIN 400 MILLIGRAM(S): 400 CAPSULE ORAL at 06:10

## 2019-05-12 NOTE — PROGRESS NOTE ADULT - ASSESSMENT
69 yo female patient with PMHx HTN, DM II, CAD/CABG x2 (last in 2013), DL, RA on chronic prednisone 15mg daily c/b osteoporosis and vertebral fractures s/p kyphoplasty surgeries (last done 10/'18) -- presents to hospital for fevers / sob x 1d, along with abd distention/discomfort/constipation. Found to have Tmax 103, MERLYN PNA on CT, with WBC 20 on initial presentation, and admitted for sepsis / pna.    Sepsis due to suspected gram negative pneumonia  -sepsis present on admisison  -ID note appreciated  -c/w cefepime, levaquin  -f/u blood cultures    Metabolic encephalopathy  -pt sleeping during exam, family requseting not to wake her up but that her mental status improved  -likely due to infection    DINO on CKD III  -c/w IVF  -continue to trend  -check u lytes / cr    Tropoenmia  -likely demand ischemia from sepsis  -no chest pain      PMR   -c/w prednisone    Proximal weakness  -may be due to sepsis vs PMR vs steroid myopathy  -pt rehab    Hyponatremia  -check serum and urine osm, urine sodium    Hypokalemia  -resolved    - PT eval     Compression fractures   - due to chronic steroid therapy s/p kyphoplasty    Malnutrition - nutrition eval .    Dyslipidemia  -c/w lipitor    HTN   - c/w losartan, imdur,     CAD   - c/w plavix, losartan, imdur, lipitor, metoprolol (should change to succinate)    DM II  -c/w insulin  -goal 110-180     DVT PPX    #Progress Note Handoff:  Pending: clinical improvement  Family discussion: d/w son at bedside  Disposition: Home___/SNF___/Other________/Unknown at this time___x_____ 69 yo female patient with PMHx HTN, DM II, CAD/CABG x2 (last in 2013), DL, RA on chronic prednisone 15mg daily c/b osteoporosis and vertebral fractures s/p kyphoplasty surgeries (last done 10/'18) -- presents to hospital for fevers / sob x 1d, along with abd distention/discomfort/constipation. Found to have Tmax 103, MERLYN PNA on CT, with WBC 20 on initial presentation, and admitted for sepsis / pna.    Sepsis due to suspected gram negative pneumonia  -sepsis present on admisison  -ID note appreciated  -c/w cefepime, levaquin  -f/u blood cultures    Metabolic encephalopathy  -pt sleeping during exam, family requseting not to wake her up but that her mental status improved  -likely due to infection    Microcytic anemia  -severely low MCV  -elevated RDW  -check iron studies, electrophoresis  -other cell lines ok    DINO on CKD III  -c/w IVF  -continue to trend  -check u lytes / cr    Tropoenmia  -likely demand ischemia from sepsis  -no chest pain      PMR   -c/w prednisone    Proximal weakness  -may be due to sepsis vs PMR vs steroid myopathy  -pt rehab    Hyponatremia  -check serum and urine osm, urine sodium    Hypokalemia  -resolved    - PT eval     Compression fractures   - due to chronic steroid therapy s/p kyphoplasty    Malnutrition - nutrition eval .    Dyslipidemia  -c/w lipitor    HTN   - c/w losartan, imdur,     CAD   - c/w plavix, losartan, imdur, lipitor, metoprolol (should change to succinate)    DM II  -c/w insulin  -goal 110-180     DVT PPX    #Progress Note Handoff:  Pending: clinical improvement  Family discussion: d/w son at bedside  Disposition: Home___/SNF___/Other________/Unknown at this time___x_____

## 2019-05-12 NOTE — CONSULT NOTE ADULT - SUBJECTIVE AND OBJECTIVE BOX
MALLIKA WILLSON  68y, Female  Allergy: penicillin (Anaphylaxis; Angioedema)      HPI:  69 yo female patient with PMHx HTN, DM II, CAD/CABG x2 (last in ), DL, RA on chronic prednisone 15mg daily c/b osteoporosis and vertebral fractures s/p kyphoplasty surgeries (last done 10/'18) -- presents to hospital for fevers / sob x 1d.      Pt states her symptoms of sob started ~1d ptp,  a/w malaise and fever.   She does not recall having any rhinorrhea / sore throat / mm aches (does have chr back pain) / cough / sick contacts.  She is poorly ambulatory due to her back pain, and cannot lay flat due to her back, but does not report increased le swelling / sob on exertion prior to past 1-2 days.      She also reports of increased abd distention for several weeks-months, with poorly localized abd pain / pressure that radiates to L chest on day of presentation.   The abd pain is dull in nature and pressure like,  mildly alleviated with flatus.  She has not had bowel movement in 3 days,  but states she usually can go daily.  She takes opoid meds for her back pain but has not been taking her lactulose. (11 May 2019 01:52)    FAMILY HISTORY:  Family history of thalassemia (Sibling)  Family history of coronary artery disease (Father)    PAST MEDICAL & SURGICAL HISTORY:  Anemia  Osteoporosis  Rheumatoid arthritis  Dyslipidemia  HTN (hypertension)  CAD (coronary artery disease)  Diabetes  History of hemorrhoidectomy        VITALS:  T(F): 97.4, Max: 101.4 (19 @ 13:48)  HR: 76  BP: 125/67  RR: 18Vital Signs Last 24 Hrs  T(C): 36.3 (12 May 2019 06:05), Max: 38.6 (11 May 2019 13:48)  T(F): 97.4 (12 May 2019 06:05), Max: 101.4 (11 May 2019 13:48)  HR: 76 (12 May 2019 06:05) (76 - 95)  BP: 125/67 (12 May 2019 06:05) (125/67 - 149/78)  BP(mean): --  RR: 18 (12 May 2019 06:05) (18 - 18)  SpO2: 97% (12 May 2019 02:00) (97% - 97%)    TESTS & MEASUREMENTS:                        9.9    20.07 )-----------( 210      ( 10 May 2019 18:12 )             33.5     05-10    130<L>  |  93<L>  |  25<H>  ----------------------------<  240<H>  5.4<H>   |  20  |  1.6<H>    Ca    8.9      10 May 2019 18:12    TPro  5.8<L>  /  Alb  3.3<L>  /  TBili  0.3  /  DBili  0.2  /  AST  31  /  ALT  20  /  AlkPhos  76  05-10    LIVER FUNCTIONS - ( 10 May 2019 18:12 )  Alb: 3.3 g/dL / Pro: 5.8 g/dL / ALK PHOS: 76 U/L / ALT: 20 U/L / AST: 31 U/L / GGT: x             Culture - Blood (collected 05-10-19 @ 18:12)  Source: .Blood Blood-Venous  Preliminary Report (19 @ 03:50):    No growth to date.    Culture - Blood (collected 05-10-19 @ 18:12)  Source: .Blood Blood-Venous  Preliminary Report (19 @ 03:50):    No growth to date.      Urinalysis Basic - ( 10 May 2019 20:05 )    Color: Yellow / Appearance: Cloudy / S.020 / pH: x  Gluc: x / Ketone: Trace  / Bili: Negative / Urobili: 0.2 mg/dL   Blood: x / Protein: 100 mg/dL / Nitrite: Negative   Leuk Esterase: Negative / RBC: 1-2 /HPF / WBC 1-2 /HPF   Sq Epi: x / Non Sq Epi: Occasional /HPF / Bacteria: Few /HPF          RADIOLOGY & ADDITIONAL TESTS:    ANTIBIOTICS:  aztreonam  IVPB      aztreonam  IVPB 1000 milliGRAM(s) IV Intermittent every 8 hours  vancomycin  IVPB 1000 milliGRAM(s) IV Intermittent every 24 hours  vancomycin  IVPB

## 2019-05-12 NOTE — CONSULT NOTE ADULT - ASSESSMENT
67 yo female patient with PMHx HTN, DM II, CAD/CABG x2 (last in 2013), DL, RA on chronic prednisone 15mg daily c/b osteoporosis and vertebral fractures s/p kyphoplasty surgeries (last done 10/'18) -- presents to hospital for fevers / sob x 1d, along with abd distention/discomfort/constipation.  IMPRESSION:  Sepsis ( Temp 103, HR 95, WBC 20 ) secondary to acute GNR multilobar PNA MERLYN/lingula   Immunosuppressed ( on Prednisone 15 mg q24h for >one year ) with Ho negative PPD ( as per son )  Constipation  DINO    RECOMMENDATIONS;  Bx.  Nares for ORSA  Urine legionella / strep pneumonia antigen  Cefepime 2 gm iv q12h ( I am aware of PCN allergies )  Levoquin 500 mg iv q24h  Treat constipation

## 2019-05-13 LAB
-  AMIKACIN: SIGNIFICANT CHANGE UP
-  AMPICILLIN/SULBACTAM: SIGNIFICANT CHANGE UP
-  AMPICILLIN: SIGNIFICANT CHANGE UP
-  AZTREONAM: SIGNIFICANT CHANGE UP
-  CEFAZOLIN: SIGNIFICANT CHANGE UP
-  CEFEPIME: SIGNIFICANT CHANGE UP
-  CEFOXITIN: SIGNIFICANT CHANGE UP
-  CEFTRIAXONE: SIGNIFICANT CHANGE UP
-  CIPROFLOXACIN: SIGNIFICANT CHANGE UP
-  ERTAPENEM: SIGNIFICANT CHANGE UP
-  GENTAMICIN: SIGNIFICANT CHANGE UP
-  IMIPENEM: SIGNIFICANT CHANGE UP
-  LEVOFLOXACIN: SIGNIFICANT CHANGE UP
-  MEROPENEM: SIGNIFICANT CHANGE UP
-  NITROFURANTOIN: SIGNIFICANT CHANGE UP
-  PIPERACILLIN/TAZOBACTAM: SIGNIFICANT CHANGE UP
-  TIGECYCLINE: SIGNIFICANT CHANGE UP
-  TOBRAMYCIN: SIGNIFICANT CHANGE UP
-  TRIMETHOPRIM/SULFAMETHOXAZOLE: SIGNIFICANT CHANGE UP
ANION GAP SERPL CALC-SCNC: 18 MMOL/L — HIGH (ref 7–14)
ANISOCYTOSIS BLD QL: SIGNIFICANT CHANGE UP
BASOPHILS # BLD AUTO: 0 K/UL — SIGNIFICANT CHANGE UP (ref 0–0.2)
BASOPHILS NFR BLD AUTO: 0 % — SIGNIFICANT CHANGE UP (ref 0–1)
BUN SERPL-MCNC: 23 MG/DL — HIGH (ref 10–20)
CALCIUM SERPL-MCNC: 8.1 MG/DL — LOW (ref 8.5–10.1)
CHLORIDE SERPL-SCNC: 100 MMOL/L — SIGNIFICANT CHANGE UP (ref 98–110)
CO2 SERPL-SCNC: 18 MMOL/L — SIGNIFICANT CHANGE UP (ref 17–32)
CREAT SERPL-MCNC: 1.7 MG/DL — HIGH (ref 0.7–1.5)
CULTURE RESULTS: SIGNIFICANT CHANGE UP
EOSINOPHIL # BLD AUTO: 0 K/UL — SIGNIFICANT CHANGE UP (ref 0–0.7)
EOSINOPHIL NFR BLD AUTO: 0 % — SIGNIFICANT CHANGE UP (ref 0–8)
GIANT PLATELETS BLD QL SMEAR: PRESENT — SIGNIFICANT CHANGE UP
GLUCOSE BLDC GLUCOMTR-MCNC: 154 MG/DL — HIGH (ref 70–99)
GLUCOSE BLDC GLUCOMTR-MCNC: 301 MG/DL — HIGH (ref 70–99)
GLUCOSE BLDC GLUCOMTR-MCNC: 313 MG/DL — HIGH (ref 70–99)
GLUCOSE BLDC GLUCOMTR-MCNC: 321 MG/DL — HIGH (ref 70–99)
GLUCOSE SERPL-MCNC: 343 MG/DL — HIGH (ref 70–99)
HCT VFR BLD CALC: 24.8 % — LOW (ref 37–47)
HGB BLD-MCNC: 7.6 G/DL — LOW (ref 12–16)
HYPOCHROMIA BLD QL: SIGNIFICANT CHANGE UP
IRON SATN MFR SERPL: 18 UG/DL — LOW (ref 35–150)
IRON SATN MFR SERPL: 8 % — LOW (ref 15–50)
LYMPHOCYTES # BLD AUTO: 0.09 K/UL — LOW (ref 1.2–3.4)
LYMPHOCYTES # BLD AUTO: 0.9 % — LOW (ref 20.5–51.1)
MAGNESIUM SERPL-MCNC: 1.6 MG/DL — LOW (ref 1.8–2.4)
MANUAL SMEAR VERIFICATION: SIGNIFICANT CHANGE UP
MCHC RBC-ENTMCNC: 17.9 PG — LOW (ref 27–31)
MCHC RBC-ENTMCNC: 30.6 G/DL — LOW (ref 32–37)
MCV RBC AUTO: 58.4 FL — LOW (ref 81–99)
METHOD TYPE: SIGNIFICANT CHANGE UP
MICROCYTES BLD QL: SIGNIFICANT CHANGE UP
MONOCYTES # BLD AUTO: 0.95 K/UL — HIGH (ref 0.1–0.6)
MONOCYTES NFR BLD AUTO: 9.6 % — HIGH (ref 1.7–9.3)
MRSA PCR RESULT.: NEGATIVE — SIGNIFICANT CHANGE UP
NEUTROPHILS # BLD AUTO: 8.85 K/UL — HIGH (ref 1.4–6.5)
NEUTROPHILS NFR BLD AUTO: 88.6 % — HIGH (ref 42.2–75.2)
NEUTS BAND # BLD: 0.9 % — SIGNIFICANT CHANGE UP (ref 0–6)
NRBC # BLD: 1 /100 — HIGH (ref 0–0)
NRBC # BLD: SIGNIFICANT CHANGE UP /100 WBCS (ref 0–0)
ORGANISM # SPEC MICROSCOPIC CNT: SIGNIFICANT CHANGE UP
ORGANISM # SPEC MICROSCOPIC CNT: SIGNIFICANT CHANGE UP
OSMOLALITY SERPL: 299 MOS/KG — SIGNIFICANT CHANGE UP (ref 289–308)
OSMOLALITY UR: 304 MOS/KG — SIGNIFICANT CHANGE UP (ref 50–1400)
OVALOCYTES BLD QL SMEAR: SLIGHT — SIGNIFICANT CHANGE UP
PHOSPHATE SERPL-MCNC: 2.2 MG/DL — SIGNIFICANT CHANGE UP (ref 2.1–4.9)
PLAT MORPH BLD: ABNORMAL
PLATELET # BLD AUTO: 199 K/UL — SIGNIFICANT CHANGE UP (ref 130–400)
POIKILOCYTOSIS BLD QL AUTO: SIGNIFICANT CHANGE UP
POLYCHROMASIA BLD QL SMEAR: SLIGHT — SIGNIFICANT CHANGE UP
POTASSIUM SERPL-MCNC: 4.5 MMOL/L — SIGNIFICANT CHANGE UP (ref 3.5–5)
POTASSIUM SERPL-SCNC: 4.5 MMOL/L — SIGNIFICANT CHANGE UP (ref 3.5–5)
RBC # BLD: 4.25 M/UL — SIGNIFICANT CHANGE UP (ref 4.2–5.4)
RBC # FLD: 17 % — HIGH (ref 11.5–14.5)
RBC BLD AUTO: ABNORMAL
SODIUM SERPL-SCNC: 136 MMOL/L — SIGNIFICANT CHANGE UP (ref 135–146)
SODIUM UR-SCNC: 20 MMOL/L — SIGNIFICANT CHANGE UP
SPECIMEN SOURCE: SIGNIFICANT CHANGE UP
TARGETS BLD QL SMEAR: SLIGHT — SIGNIFICANT CHANGE UP
TIBC SERPL-MCNC: 230 UG/DL — SIGNIFICANT CHANGE UP (ref 220–430)
TRANSFERRIN SERPL-MCNC: 197 MG/DL — LOW (ref 200–360)
UIBC SERPL-MCNC: 212 UG/DL — SIGNIFICANT CHANGE UP (ref 110–370)
WBC # BLD: 9.89 K/UL — SIGNIFICANT CHANGE UP (ref 4.8–10.8)
WBC # FLD AUTO: 9.89 K/UL — SIGNIFICANT CHANGE UP (ref 4.8–10.8)

## 2019-05-13 PROCEDURE — 71045 X-RAY EXAM CHEST 1 VIEW: CPT | Mod: 26

## 2019-05-13 RX ORDER — LOSARTAN POTASSIUM 100 MG/1
50 TABLET, FILM COATED ORAL ONCE
Refills: 0 | Status: COMPLETED | OUTPATIENT
Start: 2019-05-13 | End: 2019-05-13

## 2019-05-13 RX ORDER — DEXTROSE 50 % IN WATER 50 %
25 SYRINGE (ML) INTRAVENOUS ONCE
Refills: 0 | Status: DISCONTINUED | OUTPATIENT
Start: 2019-05-13 | End: 2019-05-17

## 2019-05-13 RX ORDER — INSULIN GLARGINE 100 [IU]/ML
18 INJECTION, SOLUTION SUBCUTANEOUS EVERY MORNING
Refills: 0 | Status: DISCONTINUED | OUTPATIENT
Start: 2019-05-13 | End: 2019-05-14

## 2019-05-13 RX ORDER — INSULIN LISPRO 100/ML
5 VIAL (ML) SUBCUTANEOUS
Refills: 0 | Status: DISCONTINUED | OUTPATIENT
Start: 2019-05-13 | End: 2019-05-14

## 2019-05-13 RX ORDER — FUROSEMIDE 40 MG
40 TABLET ORAL ONCE
Refills: 0 | Status: COMPLETED | OUTPATIENT
Start: 2019-05-13 | End: 2019-05-13

## 2019-05-13 RX ORDER — GLUCAGON INJECTION, SOLUTION 0.5 MG/.1ML
1 INJECTION, SOLUTION SUBCUTANEOUS ONCE
Refills: 0 | Status: DISCONTINUED | OUTPATIENT
Start: 2019-05-13 | End: 2019-05-17

## 2019-05-13 RX ORDER — DEXTROSE 50 % IN WATER 50 %
12.5 SYRINGE (ML) INTRAVENOUS ONCE
Refills: 0 | Status: DISCONTINUED | OUTPATIENT
Start: 2019-05-13 | End: 2019-05-17

## 2019-05-13 RX ORDER — INSULIN LISPRO 100/ML
VIAL (ML) SUBCUTANEOUS
Refills: 0 | Status: DISCONTINUED | OUTPATIENT
Start: 2019-05-13 | End: 2019-05-17

## 2019-05-13 RX ORDER — MAGNESIUM SULFATE 500 MG/ML
2 VIAL (ML) INJECTION ONCE
Refills: 0 | Status: COMPLETED | OUTPATIENT
Start: 2019-05-13 | End: 2019-05-13

## 2019-05-13 RX ORDER — DEXTROSE 50 % IN WATER 50 %
15 SYRINGE (ML) INTRAVENOUS ONCE
Refills: 0 | Status: DISCONTINUED | OUTPATIENT
Start: 2019-05-13 | End: 2019-05-17

## 2019-05-13 RX ORDER — SODIUM CHLORIDE 9 MG/ML
1000 INJECTION, SOLUTION INTRAVENOUS
Refills: 0 | Status: DISCONTINUED | OUTPATIENT
Start: 2019-05-13 | End: 2019-05-17

## 2019-05-13 RX ADMIN — HEPARIN SODIUM 5000 UNIT(S): 5000 INJECTION INTRAVENOUS; SUBCUTANEOUS at 13:54

## 2019-05-13 RX ADMIN — Medication 4: at 16:40

## 2019-05-13 RX ADMIN — GABAPENTIN 400 MILLIGRAM(S): 400 CAPSULE ORAL at 13:54

## 2019-05-13 RX ADMIN — PANTOPRAZOLE SODIUM 40 MILLIGRAM(S): 20 TABLET, DELAYED RELEASE ORAL at 06:02

## 2019-05-13 RX ADMIN — ATORVASTATIN CALCIUM 40 MILLIGRAM(S): 80 TABLET, FILM COATED ORAL at 22:27

## 2019-05-13 RX ADMIN — CLOPIDOGREL BISULFATE 75 MILLIGRAM(S): 75 TABLET, FILM COATED ORAL at 11:24

## 2019-05-13 RX ADMIN — Medication 12.5 GRAM(S): at 22:27

## 2019-05-13 RX ADMIN — Medication 25 MILLIGRAM(S): at 17:11

## 2019-05-13 RX ADMIN — Medication 100 MILLIGRAM(S): at 22:27

## 2019-05-13 RX ADMIN — POLYETHYLENE GLYCOL 3350 17 GRAM(S): 17 POWDER, FOR SOLUTION ORAL at 11:24

## 2019-05-13 RX ADMIN — CEFEPIME 100 MILLIGRAM(S): 1 INJECTION, POWDER, FOR SOLUTION INTRAMUSCULAR; INTRAVENOUS at 06:01

## 2019-05-13 RX ADMIN — Medication 5 MILLIGRAM(S): at 13:54

## 2019-05-13 RX ADMIN — INSULIN GLARGINE 30 UNIT(S): 100 INJECTION, SOLUTION SUBCUTANEOUS at 08:49

## 2019-05-13 RX ADMIN — Medication 5 UNIT(S): at 16:41

## 2019-05-13 RX ADMIN — HEPARIN SODIUM 5000 UNIT(S): 5000 INJECTION INTRAVENOUS; SUBCUTANEOUS at 06:03

## 2019-05-13 RX ADMIN — Medication 25 MILLIGRAM(S): at 06:02

## 2019-05-13 RX ADMIN — SENNA PLUS 2 TABLET(S): 8.6 TABLET ORAL at 22:27

## 2019-05-13 RX ADMIN — SODIUM CHLORIDE 70 MILLILITER(S): 9 INJECTION INTRAMUSCULAR; INTRAVENOUS; SUBCUTANEOUS at 06:44

## 2019-05-13 RX ADMIN — GABAPENTIN 400 MILLIGRAM(S): 400 CAPSULE ORAL at 06:02

## 2019-05-13 RX ADMIN — LOSARTAN POTASSIUM 50 MILLIGRAM(S): 100 TABLET, FILM COATED ORAL at 06:02

## 2019-05-13 RX ADMIN — Medication 100 MILLIGRAM(S): at 06:02

## 2019-05-13 RX ADMIN — Medication 40 MILLIGRAM(S): at 08:50

## 2019-05-13 RX ADMIN — ISOSORBIDE MONONITRATE 30 MILLIGRAM(S): 60 TABLET, EXTENDED RELEASE ORAL at 11:24

## 2019-05-13 RX ADMIN — GABAPENTIN 400 MILLIGRAM(S): 400 CAPSULE ORAL at 22:27

## 2019-05-13 RX ADMIN — Medication 5 MILLIGRAM(S): at 06:13

## 2019-05-13 RX ADMIN — CEFEPIME 100 MILLIGRAM(S): 1 INJECTION, POWDER, FOR SOLUTION INTRAMUSCULAR; INTRAVENOUS at 17:11

## 2019-05-13 RX ADMIN — HEPARIN SODIUM 5000 UNIT(S): 5000 INJECTION INTRAVENOUS; SUBCUTANEOUS at 22:28

## 2019-05-13 RX ADMIN — LOSARTAN POTASSIUM 50 MILLIGRAM(S): 100 TABLET, FILM COATED ORAL at 08:49

## 2019-05-13 RX ADMIN — Medication 5 MILLIGRAM(S): at 22:28

## 2019-05-13 RX ADMIN — Medication 100 MILLIGRAM(S): at 13:54

## 2019-05-13 NOTE — PHYSICAL THERAPY INITIAL EVALUATION ADULT - PATIENT PROFILE REVIEW, REHAB EVAL
Problem: Fall Risk (Adult)  Goal: Absence of Falls  Patient will demonstrate the desired outcomes by discharge/transition of care.  Outcome: Ongoing (interventions implemented as appropriate)  PT AND FAMILY AT [facility] UNDERSTOOD TO CALL FOR ASSISTANCE BEFORE GETTING UP       yes

## 2019-05-13 NOTE — PROGRESS NOTE ADULT - ASSESSMENT
Sepsis due to suspected gram negative pneumonia with worsening SOB today got IV fluids overnight and likely caused acute diastolic chf chest xray worse today   -improved after IV lasix 40 times one today   -sepsis present on admisison  -ID follow up   -c/w cefepime, levaquin  -check MRSA PCR   -pulm eval ??Loculated effusion   -Blood cultures NTD     Metabolic encephalopathy secondary to sepsis from PNA   mental status back to baseline     Microcytic anemia  -check iron studies, electrophoresis    DINO on CKD III  -D/C IVF given above   -repeat BMP today     Tropoenmia  -likely demand ischemia from sepsis  -no chest pain    PMR   -c/w prednisone    Proximal weakness  -may be due to sepsis vs PMR vs steroid myopathy  -pt rehab    Hyponatremia  -trend BMP     Hypokalemia  -resolved    - PT eval     Compression fractures   due to chronic steroid therapy s/p kyphoplasty    Dyslipidemia  -c/w lipitor    HTN   - controlled     CAD   - c/w plavix, losartan on hold DINO, imdur, lipitor, metoprolol     DM II  -c/w insulin  -goal 110-180     DVT PPX    #Progress Note Handoff:  Pending: clinical improvement  Family discussion: d/w daughter  at bedside  Disposition: Home___/SNF___/Other________/Unknown at this time___x_____

## 2019-05-13 NOTE — CONSULT NOTE ADULT - SUBJECTIVE AND OBJECTIVE BOX
Patient is a 68y old  Female who presents with a chief complaint of     HPI:  69 yo female patient with PMHx HTN, DM II, CAD/CABG x2 (last in 2013), DL, RA on chronic prednisone 15mg daily c/b osteoporosis and vertebral fractures s/p kyphoplasty surgeries (last done 10/'18) -- presents to hospital for fevers / sob x 1d.      Pt states her symptoms of sob started ~1d ptp,  a/w malaise and fever.   She does not recall having any rhinorrhea / sore throat / mm aches (does have chr back pain) / cough / sick contacts.  She is poorly ambulatory due to her back pain, and cannot lay flat due to her back, but does not report increased le swelling / sob on exertion prior to past 1-2 days.      She also reports of increased abd distention for several weeks-months, with poorly localized abd pain / pressure that radiates to L chest on day of presentation.   The abd pain is dull in nature and pressure like,  mildly alleviated with flatus.  She has not had bowel movement in 3 days,  but states she usually can go daily.  She takes opoid meds for her back pain but has not been taking her lactulose. (11 May 2019 01:52)      Allergies    penicillin (Anaphylaxis; Angioedema)    Intolerances        Home Medications:  atorvastatin 40 mg oral tablet: 1 tab(s) orally once a day (at bedtime) (11 May 2019 01:15)  ISOSORBIDE MONONITRATE 10 MG TABS: 2 times a day (11 May 2019 01:15)  Januvia 100 mg oral tablet: 1 tab(s) orally once a day (11 May 2019 01:15)  Levemir FlexPen 100 units/mL subcutaneous solution: 30 unit(s) subcutaneous 2 times a day (11 May 2019 01:15)  losartan 50 mg oral tablet: 1 tab(s) orally once a day (11 May 2019 01:15)  METOPROLOL TARTRATE 50 MG TABS:  (11 May 2019 01:15)  omeprazole 20 mg oral delayed release capsule: 1 cap(s) orally once a day (11 May 2019 01:15)  Plavix 75 mg oral tablet: 1 tab(s) orally once a day (11 May 2019 01:15)  predniSONE 5 mg oral tablet: 1 tab(s) orally 3 times a day (11 May 2019 01:15)  raNITIdine 150 mg oral tablet: 1 tab(s) orally 2 times a day (11 May 2019 01:15)      SOCIAL HX:    Smoking            ETOH/illicit drugs             Occupation    PAST MEDICAL & SURGICAL HISTORY:  Anemia  Osteoporosis  Rheumatoid arthritis  Dyslipidemia  HTN (hypertension)  CAD (coronary artery disease)  Diabetes  History of hemorrhoidectomy      FAMILY HISTORY:  Family history of thalassemia (Sibling)  Family history of coronary artery disease (Father)  .    No cardiovascular or pulmonary family history     Review of System:  See HPI    PHYSICAL EXAM  Vital Signs Last 24 Hrs  T(C): 36.6 (13 May 2019 04:17), Max: 36.6 (12 May 2019 14:06)  T(F): 97.8 (13 May 2019 04:17), Max: 97.9 (12 May 2019 14:06)  HR: 82 (13 May 2019 06:14) (71 - 86)  BP: 159/81 (13 May 2019 04:17) (133/69 - 183/86)  BP(mean): 123 (12 May 2019 20:30) (123 - 123)  RR: 24 (13 May 2019 06:14) (17 - 24)  SpO2: 98% (13 May 2019 06:14) (96% - 98%)    General: In NAD  HEENT: SHIV             Lymphatic system: No LN  appreciated  Lungs: Elijah BS  Cardiovascular: Regular  Gastrointestinal: Soft.  + BS   Musculoskeletal: No Clubbing.  Moves all extremities  Skin: Warm.  Intact  Neurological: No motor or sensory deficit       LABS:                          7.5    12.59 )-----------( 188      ( 12 May 2019 11:04 )             24.7                                               05-12    131<L>  |  98  |  22<H>  ----------------------------<  250<H>  4.5   |  20  |  1.6<H>    Ca    8.1<L>      12 May 2019 11:04  Mg     2.0     05-12                                                                                                                                      Culture - Blood (collected 11 May 2019 07:11)  Source: .Blood None  Preliminary Report (12 May 2019 17:00):    No growth to date.    Culture - Blood (collected 11 May 2019 07:11)  Source: .Blood None  Preliminary Report (12 May 2019 17:00):    No growth to date.    Culture - Urine (collected 10 May 2019 20:05)  Source: .Urine Clean Catch (Midstream)  Final Report (13 May 2019 09:11):    10,000 - 49,000 CFU/mL Escherichia coli  Organism: Escherichia coli (13 May 2019 09:11)  Organism: Escherichia coli (13 May 2019 09:11)    Culture - Blood (collected 10 May 2019 18:12)  Source: .Blood Blood-Venous  Preliminary Report (12 May 2019 03:50):    No growth to date.    Culture - Blood (collected 10 May 2019 18:12)  Source: .Blood Blood-Venous  Preliminary Report (12 May 2019 03:50):    No growth to date.                                                      Serum Pro-Brain Natriuretic Peptide: 1874 pg/mL (05-10-19 @ 18:12)    < from: CT Angio Chest w/ IV Cont (05.10.19 @ 22:35) >    IMPRESSION:     1. Left upper lobe and lingular segments consolidation, consistent with   pneumonia. Follow-up imaging to resolution recommended.    2. Cardiomegaly. Reflux of contrast into IVC ; findings may be seen and   right heart dysfunction.    3. No evidence of acute pulmonary embolism or acute intra-abdominal   pathology.        < end of copied text >      < from: Transthoracic Echocardiogram (05.11.19 @ 16:03) >         Summary:   1. Normal global left ventricular systolic function.   2. LV Ejection Fraction by Maza's Method with a biplane EF of 62 %.   3. LA is mildly dilated.   4. Mild mitral valve regurgitation.   5. Mild thickening of the anterior mitral valve leaflet.   6. Mild aortic regurgitation.    < end of copied text >      MEDICATIONS  (STANDING):  atorvastatin 40 milliGRAM(s) Oral at bedtime  cefepime   IVPB      cefepime   IVPB 2000 milliGRAM(s) IV Intermittent every 12 hours  clopidogrel Tablet 75 milliGRAM(s) Oral daily  dextrose 5%. 1000 milliLiter(s) (50 mL/Hr) IV Continuous <Continuous>  dextrose 50% Injectable 12.5 Gram(s) IV Push once  dextrose 50% Injectable 25 Gram(s) IV Push once  dextrose 50% Injectable 25 Gram(s) IV Push once  docusate sodium 100 milliGRAM(s) Oral three times a day  gabapentin 400 milliGRAM(s) Oral three times a day  heparin  Injectable 5000 Unit(s) SubCutaneous every 8 hours  insulin glargine Injectable (LANTUS) 18 Unit(s) SubCutaneous every morning  insulin lispro (HumaLOG) corrective regimen sliding scale   SubCutaneous three times a day before meals  insulin lispro Injectable (HumaLOG) 5 Unit(s) SubCutaneous three times a day before meals  isosorbide   mononitrate ER Tablet (IMDUR) 30 milliGRAM(s) Oral daily  levoFLOXacin IVPB 500 milliGRAM(s) IV Intermittent every 24 hours  levoFLOXacin IVPB      metoprolol tartrate 25 milliGRAM(s) Oral two times a day  pantoprazole    Tablet 40 milliGRAM(s) Oral before breakfast  polyethylene glycol 3350 17 Gram(s) Oral daily  predniSONE   Tablet 5 milliGRAM(s) Oral three times a day  senna 2 Tablet(s) Oral at bedtime    MEDICATIONS  (PRN):  acetaminophen   Tablet .. 650 milliGRAM(s) Oral every 6 hours PRN Temp greater or equal to 38C (100.4F)  aluminum hydroxide/magnesium hydroxide/simethicone Suspension 30 milliLiter(s) Oral every 6 hours PRN Dyspepsia  dextrose 40% Gel 15 Gram(s) Oral once PRN Blood Glucose LESS THAN 70 milliGRAM(s)/deciliter  glucagon  Injectable 1 milliGRAM(s) IntraMuscular once PRN Glucose LESS THAN 70 milligrams/deciliter  oxyCODONE    IR 5 milliGRAM(s) Oral three times a day PRN Moderate Pain (4 - 6) Patient is a 68y old  Female who presents with a chief complaint of     HPI:    69 yo F never smoker, CAD, RA on prednisone 15mg daily, Dm, HTN present to ED for shortness of breath, chest tightness, fevers that began 3 days PTP. No recent abx use no prior hospitalization in 3 months.    Admitted for suspected GNR pneumonia and sepsis, improved clinically with reduced fevers and leukocytosis, repeat CXR shows slightly more blunting of left costophrenic angle.    Allergies    penicillin (Anaphylaxis; Angioedema)    Intolerances    Home Medications:  atorvastatin 40 mg oral tablet: 1 tab(s) orally once a day (at bedtime) (11 May 2019 01:15)  ISOSORBIDE MONONITRATE 10 MG TABS: 2 times a day (11 May 2019 01:15)  Januvia 100 mg oral tablet: 1 tab(s) orally once a day (11 May 2019 01:15)  Levemir FlexPen 100 units/mL subcutaneous solution: 30 unit(s) subcutaneous 2 times a day (11 May 2019 01:15)  losartan 50 mg oral tablet: 1 tab(s) orally once a day (11 May 2019 01:15)  METOPROLOL TARTRATE 50 MG TABS:  (11 May 2019 01:15)  omeprazole 20 mg oral delayed release capsule: 1 cap(s) orally once a day (11 May 2019 01:15)  Plavix 75 mg oral tablet: 1 tab(s) orally once a day (11 May 2019 01:15)  predniSONE 5 mg oral tablet: 1 tab(s) orally 3 times a day (11 May 2019 01:15)  raNITIdine 150 mg oral tablet: 1 tab(s) orally 2 times a day (11 May 2019 01:15)      SOCIAL HX:    Smoking   no         ETOH/illicit drugs  no               PAST MEDICAL & SURGICAL HISTORY:  Anemia  Osteoporosis  Rheumatoid arthritis  Dyslipidemia  HTN (hypertension)  CAD (coronary artery disease)  Diabetes  History of hemorrhoidectomy      FAMILY HISTORY:  Family history of thalassemia (Sibling)  Family history of coronary artery disease (Father)  .    No cardiovascular or pulmonary family history     Review of System:  See HPI    PHYSICAL EXAM  Vital Signs Last 24 Hrs  T(C): 36.6 (13 May 2019 04:17), Max: 36.6 (12 May 2019 14:06)  T(F): 97.8 (13 May 2019 04:17), Max: 97.9 (12 May 2019 14:06)  HR: 82 (13 May 2019 06:14) (71 - 86)  BP: 159/81 (13 May 2019 04:17) (133/69 - 183/86)  BP(mean): 123 (12 May 2019 20:30) (123 - 123)  RR: 24 (13 May 2019 06:14) (17 - 24)  SpO2: 98% (13 May 2019 06:14) (96% - 98%) 2l    General: In NAD  HEENT: SHIV             Lymphatic system: No LN  appreciated  Lungs: Elijah BS crackles at left base  Cardiovascular: Regular  Gastrointestinal: Soft.  + BS   Musculoskeletal: No Clubbing.  Moves all extremities  Skin: Warm.  Intact  Neurological: No motor or sensory deficit       LABS:                          7.5    12.59 )-----------( 188      ( 12 May 2019 11:04 )             24.7                                               05-12    131<L>  |  98  |  22<H>  ----------------------------<  250<H>  4.5   |  20  |  1.6<H>    Ca    8.1<L>      12 May 2019 11:04  Mg     2.0     05-12                                                                                                                                      Culture - Blood (collected 11 May 2019 07:11)  Source: .Blood None  Preliminary Report (12 May 2019 17:00):    No growth to date.    Culture - Blood (collected 11 May 2019 07:11)  Source: .Blood None  Preliminary Report (12 May 2019 17:00):    No growth to date.    Culture - Urine (collected 10 May 2019 20:05)  Source: .Urine Clean Catch (Midstream)  Final Report (13 May 2019 09:11):    10,000 - 49,000 CFU/mL Escherichia coli  Organism: Escherichia coli (13 May 2019 09:11)  Organism: Escherichia coli (13 May 2019 09:11)    Culture - Blood (collected 10 May 2019 18:12)  Source: .Blood Blood-Venous  Preliminary Report (12 May 2019 03:50):    No growth to date.    Culture - Blood (collected 10 May 2019 18:12)  Source: .Blood Blood-Venous  Preliminary Report (12 May 2019 03:50):    No growth to date.                                                      Serum Pro-Brain Natriuretic Peptide: 1874 pg/mL (05-10-19 @ 18:12)    < from: CT Angio Chest w/ IV Cont (05.10.19 @ 22:35) >    IMPRESSION:     1. Left upper lobe and lingular segments consolidation, consistent with   pneumonia. Follow-up imaging to resolution recommended.    2. Cardiomegaly. Reflux of contrast into IVC ; findings may be seen and   right heart dysfunction.    3. No evidence of acute pulmonary embolism or acute intra-abdominal   pathology.        < end of copied text >      < from: Transthoracic Echocardiogram (05.11.19 @ 16:03) >         Summary:   1. Normal global left ventricular systolic function.   2. LV Ejection Fraction by Maza's Method with a biplane EF of 62 %.   3. LA is mildly dilated.   4. Mild mitral valve regurgitation.   5. Mild thickening of the anterior mitral valve leaflet.   6. Mild aortic regurgitation.    < end of copied text >      MEDICATIONS  (STANDING):  atorvastatin 40 milliGRAM(s) Oral at bedtime  cefepime   IVPB      cefepime   IVPB 2000 milliGRAM(s) IV Intermittent every 12 hours  clopidogrel Tablet 75 milliGRAM(s) Oral daily  dextrose 5%. 1000 milliLiter(s) (50 mL/Hr) IV Continuous <Continuous>  dextrose 50% Injectable 12.5 Gram(s) IV Push once  dextrose 50% Injectable 25 Gram(s) IV Push once  dextrose 50% Injectable 25 Gram(s) IV Push once  docusate sodium 100 milliGRAM(s) Oral three times a day  gabapentin 400 milliGRAM(s) Oral three times a day  heparin  Injectable 5000 Unit(s) SubCutaneous every 8 hours  insulin glargine Injectable (LANTUS) 18 Unit(s) SubCutaneous every morning  insulin lispro (HumaLOG) corrective regimen sliding scale   SubCutaneous three times a day before meals  insulin lispro Injectable (HumaLOG) 5 Unit(s) SubCutaneous three times a day before meals  isosorbide   mononitrate ER Tablet (IMDUR) 30 milliGRAM(s) Oral daily  levoFLOXacin IVPB 500 milliGRAM(s) IV Intermittent every 24 hours  levoFLOXacin IVPB      metoprolol tartrate 25 milliGRAM(s) Oral two times a day  pantoprazole    Tablet 40 milliGRAM(s) Oral before breakfast  polyethylene glycol 3350 17 Gram(s) Oral daily  predniSONE   Tablet 5 milliGRAM(s) Oral three times a day  senna 2 Tablet(s) Oral at bedtime    MEDICATIONS  (PRN):  acetaminophen   Tablet .. 650 milliGRAM(s) Oral every 6 hours PRN Temp greater or equal to 38C (100.4F)  aluminum hydroxide/magnesium hydroxide/simethicone Suspension 30 milliLiter(s) Oral every 6 hours PRN Dyspepsia  dextrose 40% Gel 15 Gram(s) Oral once PRN Blood Glucose LESS THAN 70 milliGRAM(s)/deciliter  glucagon  Injectable 1 milliGRAM(s) IntraMuscular once PRN Glucose LESS THAN 70 milligrams/deciliter  oxyCODONE    IR 5 milliGRAM(s) Oral three times a day PRN Moderate Pain (4 - 6) Patient is a 68y old  Female who presents with a chief complaint of SOB    HPI:    67 yo F never smoker, CAD, RA on prednisone 10 mg daily, Dm, HTN present to ED for shortness of breath, chest tightness, fevers that began 3 days PTP. No recent abx use no prior hospitalization in 3 months. Admitted for suspected GNR pneumonia and sepsis, improved clinically with reduced fevers and leukocytosis, repeat CXR shows slightly more blunting of left costophrenic angle. called to R/O effusion still coughing and sob. s.p lasix./ neb    Allergies    penicillin (Anaphylaxis; Angioedema)    Intolerances    Home Medications:  atorvastatin 40 mg oral tablet: 1 tab(s) orally once a day (at bedtime) (11 May 2019 01:15)  ISOSORBIDE MONONITRATE 10 MG TABS: 2 times a day (11 May 2019 01:15)  Januvia 100 mg oral tablet: 1 tab(s) orally once a day (11 May 2019 01:15)  Levemir FlexPen 100 units/mL subcutaneous solution: 30 unit(s) subcutaneous 2 times a day (11 May 2019 01:15)  losartan 50 mg oral tablet: 1 tab(s) orally once a day (11 May 2019 01:15)  METOPROLOL TARTRATE 50 MG TABS:  (11 May 2019 01:15)  omeprazole 20 mg oral delayed release capsule: 1 cap(s) orally once a day (11 May 2019 01:15)  Plavix 75 mg oral tablet: 1 tab(s) orally once a day (11 May 2019 01:15)  predniSONE 5 mg oral tablet: 1 tab(s) orally 3 times a day (11 May 2019 01:15)  raNITIdine 150 mg oral tablet: 1 tab(s) orally 2 times a day (11 May 2019 01:15)      SOCIAL HX:    Smoking   no         ETOH/illicit drugs  no               PAST MEDICAL & SURGICAL HISTORY:  Anemia  Osteoporosis  Rheumatoid arthritis  Dyslipidemia  HTN (hypertension)  CAD (coronary artery disease)  Diabetes  History of hemorrhoidectomy      FAMILY HISTORY:  Family history of thalassemia (Sibling)  Family history of coronary artery disease (Father)  .    No cardiovascular or pulmonary family history     Review of System:  See HPI    PHYSICAL EXAM  Vital Signs Last 24 Hrs  T(C): 36.6 (13 May 2019 04:17), Max: 36.6 (12 May 2019 14:06)  T(F): 97.8 (13 May 2019 04:17), Max: 97.9 (12 May 2019 14:06)  HR: 82 (13 May 2019 06:14) (71 - 86)  BP: 159/81 (13 May 2019 04:17) (133/69 - 183/86)  BP(mean): 123 (12 May 2019 20:30) (123 - 123)  RR: 24 (13 May 2019 06:14) (17 - 24)  SpO2: 98% (13 May 2019 06:14) (96% - 98%) 2l    General: In NAD  HEENT: SHIV             Lymphatic system: No LN  appreciated  Lungs: Elijah BS crackles at left base  Cardiovascular: Regular  Gastrointestinal: Soft.  + BS   Musculoskeletal: No Clubbing.  Moves all extremities  Skin: Warm.  Intact  Neurological: No motor or sensory deficit       LABS:                          7.5    12.59 )-----------( 188      ( 12 May 2019 11:04 )             24.7                                               05-12    131<L>  |  98  |  22<H>  ----------------------------<  250<H>  4.5   |  20  |  1.6<H>    Ca    8.1<L>      12 May 2019 11:04  Mg     2.0     05-12                                                                                                                                      Culture - Blood (collected 11 May 2019 07:11)  Source: .Blood None  Preliminary Report (12 May 2019 17:00):    No growth to date.    Culture - Blood (collected 11 May 2019 07:11)  Source: .Blood None  Preliminary Report (12 May 2019 17:00):    No growth to date.    Culture - Urine (collected 10 May 2019 20:05)  Source: .Urine Clean Catch (Midstream)  Final Report (13 May 2019 09:11):    10,000 - 49,000 CFU/mL Escherichia coli  Organism: Escherichia coli (13 May 2019 09:11)  Organism: Escherichia coli (13 May 2019 09:11)    Culture - Blood (collected 10 May 2019 18:12)  Source: .Blood Blood-Venous  Preliminary Report (12 May 2019 03:50):    No growth to date.    Culture - Blood (collected 10 May 2019 18:12)  Source: .Blood Blood-Venous  Preliminary Report (12 May 2019 03:50):    No growth to date.                                                      Serum Pro-Brain Natriuretic Peptide: 1874 pg/mL (05-10-19 @ 18:12)    < from: CT Angio Chest w/ IV Cont (05.10.19 @ 22:35) >    IMPRESSION:     1. Left upper lobe and lingular segments consolidation, consistent with   pneumonia. Follow-up imaging to resolution recommended.    2. Cardiomegaly. Reflux of contrast into IVC ; findings may be seen and   right heart dysfunction.    3. No evidence of acute pulmonary embolism or acute intra-abdominal   pathology.        < end of copied text >      < from: Transthoracic Echocardiogram (05.11.19 @ 16:03) >         Summary:   1. Normal global left ventricular systolic function.   2. LV Ejection Fraction by Maza's Method with a biplane EF of 62 %.   3. LA is mildly dilated.   4. Mild mitral valve regurgitation.   5. Mild thickening of the anterior mitral valve leaflet.   6. Mild aortic regurgitation.    < end of copied text >      MEDICATIONS  (STANDING):  atorvastatin 40 milliGRAM(s) Oral at bedtime  cefepime   IVPB      cefepime   IVPB 2000 milliGRAM(s) IV Intermittent every 12 hours  clopidogrel Tablet 75 milliGRAM(s) Oral daily  dextrose 5%. 1000 milliLiter(s) (50 mL/Hr) IV Continuous <Continuous>  dextrose 50% Injectable 12.5 Gram(s) IV Push once  dextrose 50% Injectable 25 Gram(s) IV Push once  dextrose 50% Injectable 25 Gram(s) IV Push once  docusate sodium 100 milliGRAM(s) Oral three times a day  gabapentin 400 milliGRAM(s) Oral three times a day  heparin  Injectable 5000 Unit(s) SubCutaneous every 8 hours  insulin glargine Injectable (LANTUS) 18 Unit(s) SubCutaneous every morning  insulin lispro (HumaLOG) corrective regimen sliding scale   SubCutaneous three times a day before meals  insulin lispro Injectable (HumaLOG) 5 Unit(s) SubCutaneous three times a day before meals  isosorbide   mononitrate ER Tablet (IMDUR) 30 milliGRAM(s) Oral daily  levoFLOXacin IVPB 500 milliGRAM(s) IV Intermittent every 24 hours  levoFLOXacin IVPB      metoprolol tartrate 25 milliGRAM(s) Oral two times a day  pantoprazole    Tablet 40 milliGRAM(s) Oral before breakfast  polyethylene glycol 3350 17 Gram(s) Oral daily  predniSONE   Tablet 5 milliGRAM(s) Oral three times a day  senna 2 Tablet(s) Oral at bedtime    MEDICATIONS  (PRN):  acetaminophen   Tablet .. 650 milliGRAM(s) Oral every 6 hours PRN Temp greater or equal to 38C (100.4F)  aluminum hydroxide/magnesium hydroxide/simethicone Suspension 30 milliLiter(s) Oral every 6 hours PRN Dyspepsia  dextrose 40% Gel 15 Gram(s) Oral once PRN Blood Glucose LESS THAN 70 milliGRAM(s)/deciliter  glucagon  Injectable 1 milliGRAM(s) IntraMuscular once PRN Glucose LESS THAN 70 milligrams/deciliter  oxyCODONE    IR 5 milliGRAM(s) Oral three times a day PRN Moderate Pain (4 - 6)

## 2019-05-13 NOTE — PROGRESS NOTE ADULT - ASSESSMENT
69 yo female patient with PMHx HTN, DM II, CAD/CABG x2 (last in 2013), DL, RA on chronic prednisone 15mg daily c/b osteoporosis and vertebral fractures s/p kyphoplasty surgeries (last done 10/'18) -- presents to hospital for fevers / sob x 1d, along with abd distention/discomfort/constipation.  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

## 2019-05-13 NOTE — PHYSICAL THERAPY INITIAL EVALUATION ADULT - GENERAL OBSERVATIONS, REHAB EVAL
[id number] am Chart reviewed. Pt. seen semirecline in bed, in No apparent distress, + +O2 at 2 L/min via nc , IV lock, telemetry , daughter in law at bedside, c/o minimal SOB however, agreed to therapy

## 2019-05-13 NOTE — PROGRESS NOTE ADULT - ASSESSMENT
This is a 68 year old female with a significant PMHx of CAD/CABG x2 (latest 2013), RA on prednisone, Chronic back pain 2/2 osteoporosis/vertebral fractures s/p kyphoplasty (10/2018), and T2DM presenting with a cc/o fever x1 day prior to presentation. She also complained of abdominal pain that radiated to the left chest wall, dull and pressure-like, and mildly alleviated with flatus. She had been constipated prior to presentation; noting she takes opioids regularly for her pain. In the ED, she had Tmax 103F, HR 95, and WBC 20. CTA Chest revealed MERLYN/Lingular consolidation consistent with multilobar PNA and contrast reflux into the IVC consisted with R-heart dysfunction. CT A/P negative for acute pathology. She was admitted with a working diagnosis of SEPSIS.    ===========================================================  Today/Updates:  ===========================================================    Sepsis secondary to Multilobar, Gram-negative Fernando Pneumonia  - On presentation, Tmax 103; HR 95; WBC 20  - Immunosuppressed on Prednisone chronically for RA  - Started on IV Rocephin and Azithromycin  - Blood NTD  - Urine Cx GNR with negative UA    Microcytic Anemia  - Hgb decreased, partly dilutional  - Order iron studies    Troponemia; Possible Type II NSTEMI; Hx of CAD/CABG, HTN, and DLD  - Troponin elevation in setting of DINO/CKD vs. CAD/CABG demand from sepsis  - Troponins stable without EKG changes  - Continue Plavix  - Contiue BB  - Continue Losartan  - Continue statin  - Continue IMDUR    Acute Kidney Injury vs. Progression of Chronic Kidney Disease   - Continue IVF  - Monitor BMP  - Follow up Urine studies    Abdominal Pain secondary to Constipation  - Started on Doc/Senna and Miralax  - Simethicone for gas  - CT A/P begative    Rheumatoid Arthritis  - Continue Prednisone 15mg PO daily    Chronic Back Pain 2/2 Osteoproris/Vertebral Fractures s/p Kyphoplasty  - Continue pain control; Continue bowel regimen  - Poor functional status at baseline  - PT as OP if needed    Electrolyte Imbalances:   [X]  Hyponatremia; monitor; Follow up urine studies      GI ppx:                                   [X] Pantoprazole 40mg PO Daily    DVT ppx:  [X] Heparin 5000mg SubQ    Fluids:   [X] PO    Activity:  [] Ad Jasmin  /  [X] Increase as Tolerated  /  [] OOB w/ assist  /  [] Bed Rest    BMI:  Height (cm): 160.02 (05-11)  Weight (kg): 70 (05-11)  BMI (kg/m2): 27.3 (05-11)    DISPO:  Patient to be discharged when condition(s) optimized.  [X] Home    [X] Discussion with patient and/or proxy regarding goals of care.  [X] Discussed Case and Plan with the Medical Attending.    CODE STATUS  [X] FULL    Please call Dr. Whitman [[email or web address]] with any questions/consult recs: Spectra #[phone] This is a 68 year old female with a significant PMHx of CAD/CABG x2 (latest 2013), RA on prednisone, Chronic back pain 2/2 osteoporosis/vertebral fractures s/p kyphoplasty (10/2018), and T2DM presenting with a cc/o fever x1 day prior to presentation. She also complained of abdominal pain that radiated to the left chest wall, dull and pressure-like, and mildly alleviated with flatus. She had been constipated prior to presentation; noting she takes opioids regularly for her pain. In the ED, she had Tmax 103F, HR 95, and WBC 20. CTA Chest revealed MERLYN/Lingular consolidation consistent with multilobar PNA and contrast reflux into the IVC consisted with R-heart dysfunction. CT A/P negative for acute pathology. She was admitted with a working diagnosis of SEPSIS.    ===========================================================  Today/Updates:  Follow urine studies for hyponatremia and DINO; Follow up ID for ABx as well as for antigen studies requested; CXR with BL opacities and received lasix IV this morning with some improvement.   ===========================================================    Sepsis secondary to Multilobar, Gram-negative Fernando Pneumonia  - On presentation, Tmax 103; HR 95; WBC 20  - Immunosuppressed on Prednisone chronically for RA  - Started on IV Rocephin and Azithromycin; Discontinued  - Currently on Cefepime and Levofloxacin  - Blood NTD  - Urine Cx GNR with negative UA    Microcytic Anemia  - Hgb decreased, partly dilutional  - Follow up iron studies    Troponemia; Possible Type II NSTEMI; Hx of CAD/CABG, HTN, and DLD  - Troponin elevation in setting of DINO/CKD vs. CAD/CABG demand from sepsis  - Troponins stable without EKG changes  - Continue Plavix  - Contiue BB  - Holding Losartan given DINO  - Continue statin  - Continue IMDUR    Acute Kidney Injury vs. Progression of Chronic Kidney Disease   - Continue IVF  - Monitor BMP  - Follow up Urine studies    Abdominal Pain secondary to Constipation  - Started on Doc/Senna and Miralax  - CT A/P negative    Rheumatoid Arthritis  - Continue Prednisone 15mg PO daily    Chronic Back Pain 2/2 Osteoproris/Vertebral Fractures s/p Kyphoplasty  - Continue pain control; Continue bowel regimen  - Poor functional status at baseline  - PT as OP if needed    Electrolyte Imbalances:   [X]  Hyponatremia; monitor; Follow up urine studies      GI ppx:                                   [X] Pantoprazole 40mg PO Daily    DVT ppx:  [X] Heparin 5000mg SubQ    Fluids:   [X] PO    Activity:  [] Ad Jasmin  /  [X] Increase as Tolerated  /  [] OOB w/ assist  /  [] Bed Rest    BMI:  Height (cm): 160.02 (05-11)  Weight (kg): 70 (05-11)  BMI (kg/m2): 27.3 (05-11)    DISPO:  Patient to be discharged when condition(s) optimized.  [X] Home    [X] Discussion with patient and/or proxy regarding goals of care.  [X] Discussed Case and Plan with the Medical Attending.    CODE STATUS  [X] FULL    Please call Dr. Whitman [[email or web address]] with any questions/consult recs: Spectra #[phone]

## 2019-05-13 NOTE — CONSULT NOTE ADULT - ASSESSMENT
IMPRESSION:    Sepsis secondary to suspected GNR pneumonia  R/o Parapneumonic effusion  H/O RA on chronic prednisone therapy    PLAN:    ·	Will do bedside sono  ·	C/w abx, f/u cultures and urine antigens  ·	DVT ppx  ·	Supplemental oxygen to keep pox>92%, wean off as tolerated IMPRESSION:    Sepsis secondary to suspected GNR pneumonia/ Nose swab for MRSA neg  R/o Parapneumonic effusion  H/O RA on chronic prednisone therapy    PLAN:    ·	Will do bedside sono today  ·	C/w abx, f/u cultures and urine for legionnella  ·	DVT ppx  ·	Supplemental oxygen to keep pox>92%, wean off as tolerated  ·	Neb as needed  ·	will follow IMPRESSION:    Sepsis secondary to suspected GNR pneumonia/ Nose swab for MRSA neg  R/o Parapneumonic effusion  H/O RA on chronic prednisone therapy    PLAN:    ·	Bedside sono> no fluid to tap  ·	C/w abx, f/u cultures and urine for legionnella  ·	DVT ppx  ·	Supplemental oxygen to keep pox>92%, wean off as tolerated  ·	Neb as needed  ·	will follow

## 2019-05-14 LAB
ANION GAP SERPL CALC-SCNC: 18 MMOL/L — HIGH (ref 7–14)
BASOPHILS # BLD AUTO: 0.01 K/UL — SIGNIFICANT CHANGE UP (ref 0–0.2)
BASOPHILS NFR BLD AUTO: 0.1 % — SIGNIFICANT CHANGE UP (ref 0–1)
BLD GP AB SCN SERPL QL: SIGNIFICANT CHANGE UP
BUN SERPL-MCNC: 21 MG/DL — HIGH (ref 10–20)
CALCIUM SERPL-MCNC: 8.4 MG/DL — LOW (ref 8.5–10.1)
CHLORIDE SERPL-SCNC: 101 MMOL/L — SIGNIFICANT CHANGE UP (ref 98–110)
CO2 SERPL-SCNC: 18 MMOL/L — SIGNIFICANT CHANGE UP (ref 17–32)
CREAT ?TM UR-MCNC: 25 MG/DL — SIGNIFICANT CHANGE UP
CREAT SERPL-MCNC: 1.5 MG/DL — SIGNIFICANT CHANGE UP (ref 0.7–1.5)
EOSINOPHIL # BLD AUTO: 0.03 K/UL — SIGNIFICANT CHANGE UP (ref 0–0.7)
EOSINOPHIL NFR BLD AUTO: 0.3 % — SIGNIFICANT CHANGE UP (ref 0–8)
ESTIMATED AVERAGE GLUCOSE: 209 MG/DL — HIGH (ref 68–114)
FERRITIN SERPL-MCNC: 373 NG/ML — HIGH (ref 15–150)
GLUCOSE BLDC GLUCOMTR-MCNC: 111 MG/DL — HIGH (ref 70–99)
GLUCOSE BLDC GLUCOMTR-MCNC: 274 MG/DL — HIGH (ref 70–99)
GLUCOSE BLDC GLUCOMTR-MCNC: 288 MG/DL — HIGH (ref 70–99)
GLUCOSE BLDC GLUCOMTR-MCNC: 338 MG/DL — HIGH (ref 70–99)
GLUCOSE SERPL-MCNC: 212 MG/DL — HIGH (ref 70–99)
HBA1C BLD-MCNC: 8.9 % — HIGH (ref 4–5.6)
HCT VFR BLD CALC: 26 % — LOW (ref 37–47)
HGB BLD-MCNC: 8.1 G/DL — LOW (ref 12–16)
HIV 1 & 2 AB SERPL IA.RAPID: SIGNIFICANT CHANGE UP
IMM GRANULOCYTES NFR BLD AUTO: 0.9 % — HIGH (ref 0.1–0.3)
LYMPHOCYTES # BLD AUTO: 2.37 K/UL — SIGNIFICANT CHANGE UP (ref 1.2–3.4)
LYMPHOCYTES # BLD AUTO: 22.8 % — SIGNIFICANT CHANGE UP (ref 20.5–51.1)
MAGNESIUM SERPL-MCNC: 2.8 MG/DL — HIGH (ref 1.8–2.4)
MCHC RBC-ENTMCNC: 18.2 PG — LOW (ref 27–31)
MCHC RBC-ENTMCNC: 31.2 G/DL — LOW (ref 32–37)
MCV RBC AUTO: 58.3 FL — LOW (ref 81–99)
MONOCYTES # BLD AUTO: 1.32 K/UL — HIGH (ref 0.1–0.6)
MONOCYTES NFR BLD AUTO: 12.7 % — HIGH (ref 1.7–9.3)
NEUTROPHILS # BLD AUTO: 6.56 K/UL — HIGH (ref 1.4–6.5)
NEUTROPHILS NFR BLD AUTO: 63.2 % — SIGNIFICANT CHANGE UP (ref 42.2–75.2)
NRBC # BLD: 0 /100 WBCS — SIGNIFICANT CHANGE UP (ref 0–0)
PHOSPHATE SERPL-MCNC: 2.4 MG/DL — SIGNIFICANT CHANGE UP (ref 2.1–4.9)
PLATELET # BLD AUTO: 211 K/UL — SIGNIFICANT CHANGE UP (ref 130–400)
POTASSIUM SERPL-MCNC: 4.9 MMOL/L — SIGNIFICANT CHANGE UP (ref 3.5–5)
POTASSIUM SERPL-SCNC: 4.9 MMOL/L — SIGNIFICANT CHANGE UP (ref 3.5–5)
PROT ?TM UR-MCNC: 11 MG/DLG/24H — SIGNIFICANT CHANGE UP
PROT/CREAT UR-RTO: 0.4 RATIO — HIGH (ref 0–0.2)
RBC # BLD: 4.46 M/UL — SIGNIFICANT CHANGE UP (ref 4.2–5.4)
RBC # FLD: 16.9 % — HIGH (ref 11.5–14.5)
SODIUM SERPL-SCNC: 137 MMOL/L — SIGNIFICANT CHANGE UP (ref 135–146)
TYPE + AB SCN PNL BLD: SIGNIFICANT CHANGE UP
WBC # BLD: 10.38 K/UL — SIGNIFICANT CHANGE UP (ref 4.8–10.8)
WBC # FLD AUTO: 10.38 K/UL — SIGNIFICANT CHANGE UP (ref 4.8–10.8)

## 2019-05-14 RX ORDER — LACTULOSE 10 G/15ML
15 SOLUTION ORAL ONCE
Refills: 0 | Status: COMPLETED | OUTPATIENT
Start: 2019-05-14 | End: 2019-05-14

## 2019-05-14 RX ORDER — ONDANSETRON 8 MG/1
4 TABLET, FILM COATED ORAL ONCE
Refills: 0 | Status: COMPLETED | OUTPATIENT
Start: 2019-05-14 | End: 2019-05-14

## 2019-05-14 RX ORDER — FERROUS SULFATE 325(65) MG
325 TABLET ORAL DAILY
Refills: 0 | Status: DISCONTINUED | OUTPATIENT
Start: 2019-05-14 | End: 2019-05-17

## 2019-05-14 RX ORDER — INSULIN GLARGINE 100 [IU]/ML
20 INJECTION, SOLUTION SUBCUTANEOUS EVERY MORNING
Refills: 0 | Status: DISCONTINUED | OUTPATIENT
Start: 2019-05-15 | End: 2019-05-15

## 2019-05-14 RX ORDER — INSULIN LISPRO 100/ML
7 VIAL (ML) SUBCUTANEOUS
Refills: 0 | Status: DISCONTINUED | OUTPATIENT
Start: 2019-05-14 | End: 2019-05-15

## 2019-05-14 RX ADMIN — Medication 3: at 12:02

## 2019-05-14 RX ADMIN — LACTULOSE 15 GRAM(S): 10 SOLUTION ORAL at 12:03

## 2019-05-14 RX ADMIN — GABAPENTIN 400 MILLIGRAM(S): 400 CAPSULE ORAL at 21:42

## 2019-05-14 RX ADMIN — Medication 5 MILLIGRAM(S): at 06:16

## 2019-05-14 RX ADMIN — ATORVASTATIN CALCIUM 40 MILLIGRAM(S): 80 TABLET, FILM COATED ORAL at 21:43

## 2019-05-14 RX ADMIN — CEFEPIME 100 MILLIGRAM(S): 1 INJECTION, POWDER, FOR SOLUTION INTRAMUSCULAR; INTRAVENOUS at 17:05

## 2019-05-14 RX ADMIN — INSULIN GLARGINE 18 UNIT(S): 100 INJECTION, SOLUTION SUBCUTANEOUS at 07:59

## 2019-05-14 RX ADMIN — Medication 100 MILLIGRAM(S): at 06:14

## 2019-05-14 RX ADMIN — GABAPENTIN 400 MILLIGRAM(S): 400 CAPSULE ORAL at 06:16

## 2019-05-14 RX ADMIN — Medication 325 MILLIGRAM(S): at 12:07

## 2019-05-14 RX ADMIN — HEPARIN SODIUM 5000 UNIT(S): 5000 INJECTION INTRAVENOUS; SUBCUTANEOUS at 21:43

## 2019-05-14 RX ADMIN — Medication 5 MILLIGRAM(S): at 13:15

## 2019-05-14 RX ADMIN — HEPARIN SODIUM 5000 UNIT(S): 5000 INJECTION INTRAVENOUS; SUBCUTANEOUS at 06:16

## 2019-05-14 RX ADMIN — ONDANSETRON 4 MILLIGRAM(S): 8 TABLET, FILM COATED ORAL at 17:16

## 2019-05-14 RX ADMIN — Medication 100 MILLIGRAM(S): at 21:42

## 2019-05-14 RX ADMIN — PANTOPRAZOLE SODIUM 40 MILLIGRAM(S): 20 TABLET, DELAYED RELEASE ORAL at 06:14

## 2019-05-14 RX ADMIN — Medication 7 UNIT(S): at 17:05

## 2019-05-14 RX ADMIN — CLOPIDOGREL BISULFATE 75 MILLIGRAM(S): 75 TABLET, FILM COATED ORAL at 12:06

## 2019-05-14 RX ADMIN — Medication 25 MILLIGRAM(S): at 17:06

## 2019-05-14 RX ADMIN — POLYETHYLENE GLYCOL 3350 17 GRAM(S): 17 POWDER, FOR SOLUTION ORAL at 12:08

## 2019-05-14 RX ADMIN — GABAPENTIN 400 MILLIGRAM(S): 400 CAPSULE ORAL at 13:14

## 2019-05-14 RX ADMIN — HEPARIN SODIUM 5000 UNIT(S): 5000 INJECTION INTRAVENOUS; SUBCUTANEOUS at 13:14

## 2019-05-14 RX ADMIN — Medication 25 MILLIGRAM(S): at 06:14

## 2019-05-14 RX ADMIN — Medication 7 UNIT(S): at 12:03

## 2019-05-14 RX ADMIN — CEFEPIME 100 MILLIGRAM(S): 1 INJECTION, POWDER, FOR SOLUTION INTRAMUSCULAR; INTRAVENOUS at 06:15

## 2019-05-14 RX ADMIN — ISOSORBIDE MONONITRATE 30 MILLIGRAM(S): 60 TABLET, EXTENDED RELEASE ORAL at 12:05

## 2019-05-14 RX ADMIN — SENNA PLUS 2 TABLET(S): 8.6 TABLET ORAL at 21:42

## 2019-05-14 RX ADMIN — Medication 100 MILLIGRAM(S): at 13:16

## 2019-05-14 RX ADMIN — Medication 5 MILLIGRAM(S): at 21:43

## 2019-05-14 RX ADMIN — Medication 5 UNIT(S): at 07:58

## 2019-05-14 RX ADMIN — Medication 3: at 07:58

## 2019-05-14 NOTE — PROGRESS NOTE ADULT - ASSESSMENT
This is a 68 year old female with a significant PMHx of CAD/CABG x2 (latest 2013), RA on prednisone, Chronic back pain 2/2 osteoporosis/vertebral fractures s/p kyphoplasty (10/2018), and T2DM presenting with a cc/o fever x1 day prior to presentation. She also complained of abdominal pain that radiated to the left chest wall, dull and pressure-like, and mildly alleviated with flatus. She had been constipated prior to presentation; noting she takes opioids regularly for her pain. In the ED, she had Tmax 103F, HR 95, and WBC 20. CTA Chest revealed MERLYN/Lingular consolidation consistent with multilobar PNA and contrast reflux into the IVC consisted with R-heart dysfunction. CT A/P negative for acute pathology. She was admitted with a working diagnosis of SEPSIS.    ===========================================================  Today/Updates:  Hyponatremia resolved; Respiratory status significantly improved; Creatinine downward trend; Continuing IV Abx with cultures NTD.   ===========================================================    Sepsis secondary to Multilobar, Gram-negative Fernando Pneumonia  - On presentation, Tmax 103; HR 95; WBC 20  - Immunosuppressed on Prednisone chronically for RA  - Started on IV Rocephin and Azithromycin; Discontinued  - Currently on Cefepime and Levofloxacin  - Blood NTD  - Urine Cx GNR with negative UA    Microcytic Anemia; Thalassemia trait  - Hgb stable  - Starting Iron tabs  - Follow up Heme/Onc as outpatient    Troponemia; Possible Type II NSTEMI; Hx of CAD/CABG, HTN, and DLD  - Troponin elevation in setting of DINO/CKD vs. CAD/CABG demand from sepsis  - Troponins stable without EKG changes  - Continue Plavix  - Contiue BB  - Holding Losartan given DINO  - Continue Satin  - Continue IMDUR    Chronic Back Pain 2/2 Osteoproris/Vertebral Fractures s/p Kyphoplasty  - Continue pain control; Continue bowel regimen  - Poor functional status at baseline  - Possible STR vs home with services    Chronic Kidney Disease   - Monitor BMP    Abdominal Pain secondary to Constipation  - Started on Doc/Senna and Miralax  - CT A/P negative    Rheumatoid Arthritis  - Continue Prednisone 15mg PO daily    Electrolyte Imbalances:   [X]  Hyponatremia; Resolved    GI ppx:                                   [X] Pantoprazole 40mg PO Daily    DVT ppx:  [X] Heparin 5000mg SubQ    Fluids:   [X] PO    Activity:  [X] Increase as Tolerated    BMI:  Height (cm): 160.02 (05-11)  Weight (kg): 70 (05-11)  BMI (kg/m2): 27.3 (05-11)    DISPO:  Patient to be discharged when condition(s) optimized.  [X] STR vs Home with services    [X] Discussion with patient and/or proxy regarding goals of care.  [X] Discussed Case and Plan with the Medical Attending.    CODE STATUS  [X] FULL    Please call Dr. Whitman [[email or web address]] with any questions/consult recs: Spectra #[phone] This is a 68 year old female with a significant PMHx of CAD/CABG x2 (latest 2013), RA on prednisone, Chronic back pain 2/2 osteoporosis/vertebral fractures s/p kyphoplasty (10/2018), and T2DM presenting with a cc/o fever x1 day prior to presentation. She also complained of abdominal pain that radiated to the left chest wall, dull and pressure-like, and mildly alleviated with flatus. She had been constipated prior to presentation; noting she takes opioids regularly for her pain. In the ED, she had Tmax 103F, HR 95, and WBC 20. CTA Chest revealed MERLYN/Lingular consolidation consistent with multilobar PNA and contrast reflux into the IVC consisted with R-heart dysfunction. CT A/P negative for acute pathology. She was admitted with a working diagnosis of SEPSIS.    ===========================================================  Today/Updates:  Adjusted insulin for hyperglycemia; Possible STR vs home with services now as per family; Will try lactulose for BM; Hyponatremia resolved; Respiratory status significantly improved; Creatinine downward trend; Continuing IV Abx with cultures NTD.   ===========================================================    Sepsis secondary to Multilobar, Gram-negative Fernando Pneumonia  - On presentation, Tmax 103; HR 95; WBC 20  - Immunosuppressed on Prednisone chronically for RA  - Started on IV Rocephin and Azithromycin; Discontinued  - Currently on Cefepime and Levofloxacin  - Blood NTD  - Urine Cx GNR with negative UA    Microcytic Anemia; Thalassemia trait  - Hgb stable  - Starting Iron tabs  - Follow up Heme/Onc as outpatient    Troponemia; Possible Type II NSTEMI; Hx of CAD/CABG, HTN, and DLD  - Troponin elevation in setting of DINO/CKD vs. CAD/CABG demand from sepsis  - Troponins stable without EKG changes  - Continue Plavix  - Contiue BB  - Holding Losartan given DINO  - Continue Satin  - Continue IMDUR    Chronic Back Pain 2/2 Osteoproris/Vertebral Fractures s/p Kyphoplasty  - Continue pain control; Continue bowel regimen  - Poor functional status at baseline  - Possible STR vs home with services    Chronic Kidney Disease   - Monitor BMP    Abdominal Pain secondary to Constipation  - Started on Doc/Senna and Miralax  - CT A/P negative    Rheumatoid Arthritis  - Continue Prednisone 15mg PO daily    Electrolyte Imbalances:   [X]  Hyponatremia; Resolved    GI ppx:                                   [X] Pantoprazole 40mg PO Daily    DVT ppx:  [X] Heparin 5000mg SubQ    Fluids:   [X] PO    Activity:  [X] Increase as Tolerated    BMI:  Height (cm): 160.02 (05-11)  Weight (kg): 70 (05-11)  BMI (kg/m2): 27.3 (05-11)    DISPO:  Patient to be discharged when condition(s) optimized.  [X] STR vs Home with services    [X] Discussion with patient and/or proxy regarding goals of care.  [X] Discussed Case and Plan with the Medical Attending.    CODE STATUS  [X] FULL    Please call Dr. Whitman [[email or web address]-[email or web address]] with any questions/consult recs: Spectra #[phone]

## 2019-05-14 NOTE — CONSULT NOTE ADULT - SUBJECTIVE AND OBJECTIVE BOX
Patient is a 68y old  Female who presents with a chief complaint of Sepsis (14 May 2019 11:28)    HPI:  69 yo female patient with PMHx HTN, DM II, CAD/CABG x2 (last in 2013), DL, RA on chronic prednisone 15mg daily c/b osteoporosis and vertebral fractures s/p kyphoplasty surgeries (last done 10/'18) -- presents to hospital for fevers / sob x 1d.      Pt states her symptoms of sob started ~1d ptp,  a/w malaise and fever.   She does not recall having any rhinorrhea / sore throat / mm aches (does have chr back pain) / cough / sick contacts.  She is poorly ambulatory due to her back pain, and cannot lay flat due to her back, but does not report increased le swelling / sob on exertion prior to past 1-2 days.      She also reports of increased abd distention for several weeks-months, with poorly localized abd pain / pressure that radiates to L chest on day of presentation.   The abd pain is dull in nature and pressure like,  mildly alleviated with flatus.  She has not had bowel movement in 3 days,  but states she usually can go daily.  She takes opoid meds for her back pain but has not been taking her lactulose. (11 May 2019 01:52)      PAST MEDICAL & SURGICAL HISTORY:  Anemia  Osteoporosis  Rheumatoid arthritis  Dyslipidemia  HTN (hypertension)  CAD (coronary artery disease)  Diabetes  History of hemorrhoidectomy      Hospital Course: n the ED, she had Tmax 103F, HR 95, and WBC 20. CTA Chest revealed MERLYN/Lingular consolidation consistent with multilobar PNA and contrast reflux into the IVC consisted with R-heart dysfunction. CT A/P negative for acute pathology. She was admitted with a working diagnosis of SEPSIS.    Sepsis secondary to Multilobar, Gram-negative Fernando Pneumonia  - On presentation, Tmax 103; HR 95; WBC 20  - Immunosuppressed on Prednisone chronically for RA  - Started on IV Rocephin and Azithromycin; Discontinued  - Currently on Cefepime and Levofloxacin  - Blood NTD  - Urine Cx GNR with negative UA    Microcytic Anemia; Thalassemia trait  - Hgb stable  - Starting Iron tabs  - Follow up Heme/Onc as outpatient    Troponemia; Possible Type II NSTEMI; Hx of CAD/CABG, HTN, and DLD  - Troponin elevation in setting of DINO/CKD vs. CAD/CABG demand from sepsis  - Troponins stable without EKG changes  - Continue Plavix  - Contiue BB  - Holding Losartan given DINO  - Continue Satin  - Continue IMDUR    Chronic Back Pain 2/2 Osteoproris/Vertebral Fractures s/p Kyphoplasty  - Continue pain control; Continue bowel regimen  - Poor functional status at baseline  - Possible STR vs home with services    Chronic Kidney Disease   - Monitor BMP    Abdominal Pain secondary to Constipation  - Started on Doc/Senna and Miralax  - CT A/P negative    Rheumatoid Arthritis  - Continue Prednisone 15mg PO daily    TODAY'S SUBJECTIVE & REVIEW OF SYMPTOMS:     Constitutional Weakness   Cardio WNL   Resp WNL   GI chronic Abd pain  Heme WNL  Endo WNL  Skin WNL  MSK WNL  Neuro paresthesias feet  Cognitive WNL  Psych WNL      MEDICATIONS  (STANDING):  atorvastatin 40 milliGRAM(s) Oral at bedtime  cefepime   IVPB      cefepime   IVPB 2000 milliGRAM(s) IV Intermittent every 12 hours  clopidogrel Tablet 75 milliGRAM(s) Oral daily  dextrose 5%. 1000 milliLiter(s) (50 mL/Hr) IV Continuous <Continuous>  dextrose 50% Injectable 12.5 Gram(s) IV Push once  dextrose 50% Injectable 25 Gram(s) IV Push once  dextrose 50% Injectable 25 Gram(s) IV Push once  docusate sodium 100 milliGRAM(s) Oral three times a day  ferrous    sulfate 325 milliGRAM(s) Oral daily  gabapentin 400 milliGRAM(s) Oral three times a day  heparin  Injectable 5000 Unit(s) SubCutaneous every 8 hours  insulin glargine Injectable (LANTUS) 20 Unit(s) SubCutaneous every morning  insulin lispro (HumaLOG) corrective regimen sliding scale   SubCutaneous three times a day before meals  insulin lispro Injectable (HumaLOG) 7 Unit(s) SubCutaneous three times a day before meals  isosorbide   mononitrate ER Tablet (IMDUR) 30 milliGRAM(s) Oral daily  levoFLOXacin IVPB 500 milliGRAM(s) IV Intermittent every 24 hours  levoFLOXacin IVPB      metoprolol tartrate 25 milliGRAM(s) Oral two times a day  pantoprazole    Tablet 40 milliGRAM(s) Oral before breakfast  polyethylene glycol 3350 17 Gram(s) Oral daily  predniSONE   Tablet 5 milliGRAM(s) Oral three times a day  senna 2 Tablet(s) Oral at bedtime    MEDICATIONS  (PRN):  acetaminophen   Tablet .. 650 milliGRAM(s) Oral every 6 hours PRN Temp greater or equal to 38C (100.4F)  aluminum hydroxide/magnesium hydroxide/simethicone Suspension 30 milliLiter(s) Oral every 6 hours PRN Dyspepsia  dextrose 40% Gel 15 Gram(s) Oral once PRN Blood Glucose LESS THAN 70 milliGRAM(s)/deciliter  glucagon  Injectable 1 milliGRAM(s) IntraMuscular once PRN Glucose LESS THAN 70 milligrams/deciliter  oxyCODONE    IR 5 milliGRAM(s) Oral three times a day PRN Moderate Pain (4 - 6)      FAMILY HISTORY:  Family history of thalassemia (Sibling)  Family history of coronary artery disease (Father)      Allergies    penicillin (Anaphylaxis; Angioedema)    Intolerances        SOCIAL HISTORY:    [    ] Etoh  [    ] Smoking  [    ] Substance abuse     Home Environment:  [    ] Home Alone  [ x   ] Lives with Family  [    ] Home Health Aid    Dwelling:  [    ] Apartment  [   x ] Private House  [    ] Adult Home  [    ] Skilled Nursing Facility      [    ] Short Term  [    ] Long Term  [  x  ] Stairs CHAIRLIFT                           [    ] Elevator     FUNCTIONAL STATUS PTA: (Check all that apply)  Ambulation: [x     ]Independent    [    ] Dependent     [    ] Non-Ambulatory  Assistive Device: [    ] SA Cane  [    ]  Q Cane  [  x  ] Walker  [    ]  Wheelchair  ADL : [   x ] Independent  [    ]  Dependent       Vital Signs Last 24 Hrs  T(C): 36.4 (14 May 2019 12:44), Max: 37.2 (13 May 2019 21:36)  T(F): 97.6 (14 May 2019 12:44), Max: 98.9 (13 May 2019 21:36)  HR: 79 (14 May 2019 12:44) (75 - 83)  BP: 136/67 (14 May 2019 12:44) (127/63 - 165/78)  BP(mean): --  RR: 17 (14 May 2019 12:44) (17 - 18)  SpO2: --      PHYSICAL EXAM: Alert & Oriented X3  GENERAL: NAD, well-groomed, well-developed  HEAD:  Atraumatic, Normocephalic  EYES: EOMI, PERRLA, conjunctiva and sclera clear  NECK: Supple  CHEST/LUNG: Clear bilaterally  HEART: Regular rate and rhythm  ABDOMEN: Soft, sl tender, Nondistended; Bowel sounds present  EXTREMITIES:  no calf tenderness,no edema BLES    NERVOUS SYSTEM:  Cranial Nerves 2-12 intact [ x   ] Abnormal  [    ]  ROM: WFL all extremities [  x  ]  Abnormal [     ]  Motor Strength: WFL all extremities  [   x ]  Abnormal [    ]  Sensation: intact to light touch [  x  ] Abnormal [    ]      FUNCTIONAL STATUS:  Bed Mobility: [   ]  Independent [    ]  Supervision [   x ]  Needs Assistance [  ]  N/A  Transfers: [    ]  Independent [    ]  Supervision [  x  ]  Needs Assistance [    ]  N/A    Ambulation:  [    ]  Independent [    ]  Supervision [ x   ]  Needs Assistance [    ]  N/A   ADL:  [    ]   Independent [  x  ] Requires Assistance [    ] N/A   AMBULATES ON UNIT WITH RW WITH STAFF    LABS:                        8.1    10.38 )-----------( 211      ( 14 May 2019 05:54 )             26.0     05-14    137  |  101  |  21<H>  ----------------------------<  212<H>  4.9   |  18  |  1.5    Ca    8.4<L>      14 May 2019 05:54  Phos  2.4     05-14  Mg     2.8     05-14            RADIOLOGY & ADDITIONAL STUDIES:

## 2019-05-14 NOTE — PROGRESS NOTE ADULT - ASSESSMENT
· Assessment		  69 yo female patient with PMHx HTN, DM II, CAD/CABG x2 (last in 2013), DL, RA on chronic prednisone 15mg daily c/b osteoporosis and vertebral fractures s/p kyphoplasty surgeries (last done 10/'18) ,presents to hospital for fevers / sob x 1d, along with abd distention/discomfort/constipation.  IMPRESSION:  Resolved Sepsis secondary to acute GNR multilobar PNA MERLYN/lingula   Repeat CXR with no change  Doubt PJP as presentation very acute  Immunosuppressed (on Prednisone 15 mg q24h for >one year ) with Ho negative PPD ( as per son )  Constipation  DINO  BCx 5/10, 11, 12 NTD  nares ORSA neg  UCx E coli. Asymptomatic bacteuria    RECOMMENDATIONS;  Urine legionella / strep pneumonia antigen  Cefepime 2 gm iv q12h   Levoquin 500 mg iv q24h  Fungitell assay  F/u with pulmonary

## 2019-05-14 NOTE — PROGRESS NOTE ADULT - ASSESSMENT
Sepsis due to  gram negative pneumonia likely   -sepsis present on admisison  -c/w cefepime, levaquin  -MRSA PCR neg   -Blood cultures NTD     Metabolic encephalopathy resolved  secondary to sepsis from PNA   mental status back to baseline     Microcytic anemia  has DILLON start iron and  follow up as OPT for further work up     DINO on CKD III likely ATN from sepsis   improved     Tropoenmia  likely demand ischemia from sepsis  echo normal EF     constipation : maximize bowel regimen     PMR   -c/w prednisone    Hyponatremia  resolved     Dyslipidemia  -c/w lipitor    HTN   - controlled     CAD   - c/w plavix, losartan on hold DINO, imdur, lipitor, metoprolol     DM II  -c/w insulin  -goal 120-180     DVT PPX    #Progress Note Handoff:  Pending: clinical improvement  Family discussion: d/w daughter  at bedside  Disposition: likely home

## 2019-05-14 NOTE — CONSULT NOTE ADULT - ASSESSMENT
IMPRESSION: Rehab of Debilitation     PRECAUTIONS: [   x ] Cardiac  [    x] Respiratory  [    ] Seizures [    ] Contact Isolation  [    ] Droplet Isolation  [    ] Other    Weight Bearing Status:     RECOMMENDATION:    Out of Bed to Chair     DVT/Decubiti Prophylaxis    REHAB PLAN:     [   x  ] Bedside P/T 3-5 times a week   [     ] Bedside O/T  2-3 times a week   [     ] No Rehab Therapy Indicated   [     ]  Speech Therapy   Conditioning/ROM                                 ADL  Bed Mobility                                            Conditioning/ROM  Transfers                                                  Bed Mobility  Sitting /Standing Balance                      Transfers                                        Gait Training                                            Sitting/Standing Balance  Stair Training [   ]Applicable                 Home equipment Eval                                                                     Splinting  [   ] Only      GOALS:   ADL   [  x  ]   Independent         Transfers  [  x  ] Independent            Ambulation  [ x    ] Independent     [  x   ] With device                            [    ]  CG                                               [    ]  CG                                                    [     ] CG                            [    ] Min A                                          [    ] Min A                                                [     ] Min  A          DISCHARGE PLAN:   [     ]  Good candidate for Intensive Rehabilitation/Hospital based                                             Will tolerate 3hrs Intensive Rehab Daily                                       [      ]  Short Term Rehab in Skilled Nursing Facility                                       [    x  ]  Home with Outpatient or VN services                                         [      ]  Possible Candidate for Intensive Hospital based Rehab

## 2019-05-15 LAB
ANION GAP SERPL CALC-SCNC: 13 MMOL/L — SIGNIFICANT CHANGE UP (ref 7–14)
ANION GAP SERPL CALC-SCNC: 13 MMOL/L — SIGNIFICANT CHANGE UP (ref 7–14)
BASOPHILS # BLD AUTO: 0.02 K/UL — SIGNIFICANT CHANGE UP (ref 0–0.2)
BASOPHILS NFR BLD AUTO: 0.2 % — SIGNIFICANT CHANGE UP (ref 0–1)
BUN SERPL-MCNC: 21 MG/DL — HIGH (ref 10–20)
BUN SERPL-MCNC: 23 MG/DL — HIGH (ref 10–20)
CALCIUM SERPL-MCNC: 8.7 MG/DL — SIGNIFICANT CHANGE UP (ref 8.5–10.1)
CALCIUM SERPL-MCNC: 8.8 MG/DL — SIGNIFICANT CHANGE UP (ref 8.5–10.1)
CHLORIDE SERPL-SCNC: 103 MMOL/L — SIGNIFICANT CHANGE UP (ref 98–110)
CHLORIDE SERPL-SCNC: 106 MMOL/L — SIGNIFICANT CHANGE UP (ref 98–110)
CO2 SERPL-SCNC: 17 MMOL/L — SIGNIFICANT CHANGE UP (ref 17–32)
CO2 SERPL-SCNC: 19 MMOL/L — SIGNIFICANT CHANGE UP (ref 17–32)
CREAT SERPL-MCNC: 1.4 MG/DL — SIGNIFICANT CHANGE UP (ref 0.7–1.5)
CREAT SERPL-MCNC: 1.6 MG/DL — HIGH (ref 0.7–1.5)
EOSINOPHIL # BLD AUTO: 0.05 K/UL — SIGNIFICANT CHANGE UP (ref 0–0.7)
EOSINOPHIL NFR BLD AUTO: 0.6 % — SIGNIFICANT CHANGE UP (ref 0–8)
GLUCOSE BLDC GLUCOMTR-MCNC: 178 MG/DL — HIGH (ref 70–99)
GLUCOSE BLDC GLUCOMTR-MCNC: 187 MG/DL — HIGH (ref 70–99)
GLUCOSE BLDC GLUCOMTR-MCNC: 312 MG/DL — HIGH (ref 70–99)
GLUCOSE BLDC GLUCOMTR-MCNC: 342 MG/DL — HIGH (ref 70–99)
GLUCOSE BLDC GLUCOMTR-MCNC: 419 MG/DL — HIGH (ref 70–99)
GLUCOSE SERPL-MCNC: 209 MG/DL — HIGH (ref 70–99)
GLUCOSE SERPL-MCNC: 250 MG/DL — HIGH (ref 70–99)
HCT VFR BLD CALC: 27.7 % — LOW (ref 37–47)
HEMOGLOBIN INTERPRETATION: SIGNIFICANT CHANGE UP
HGB A MFR BLD: 96 % — SIGNIFICANT CHANGE UP (ref 95.8–98)
HGB A2 MFR BLD: 4 % — HIGH (ref 2–3.2)
HGB BLD-MCNC: 8.5 G/DL — LOW (ref 12–16)
IMM GRANULOCYTES NFR BLD AUTO: 4 % — HIGH (ref 0.1–0.3)
LEGIONELLA AG UR QL: NEGATIVE — SIGNIFICANT CHANGE UP
LYMPHOCYTES # BLD AUTO: 2.63 K/UL — SIGNIFICANT CHANGE UP (ref 1.2–3.4)
LYMPHOCYTES # BLD AUTO: 29.3 % — SIGNIFICANT CHANGE UP (ref 20.5–51.1)
MAGNESIUM SERPL-MCNC: 2.5 MG/DL — HIGH (ref 1.8–2.4)
MCHC RBC-ENTMCNC: 18.1 PG — LOW (ref 27–31)
MCHC RBC-ENTMCNC: 30.7 G/DL — LOW (ref 32–37)
MCV RBC AUTO: 58.9 FL — LOW (ref 81–99)
MONOCYTES # BLD AUTO: 1.16 K/UL — HIGH (ref 0.1–0.6)
MONOCYTES NFR BLD AUTO: 12.9 % — HIGH (ref 1.7–9.3)
NEUTROPHILS # BLD AUTO: 4.75 K/UL — SIGNIFICANT CHANGE UP (ref 1.4–6.5)
NEUTROPHILS NFR BLD AUTO: 53 % — SIGNIFICANT CHANGE UP (ref 42.2–75.2)
NRBC # BLD: 0 /100 WBCS — SIGNIFICANT CHANGE UP (ref 0–0)
PLATELET # BLD AUTO: 250 K/UL — SIGNIFICANT CHANGE UP (ref 130–400)
POTASSIUM SERPL-MCNC: 5.6 MMOL/L — HIGH (ref 3.5–5)
POTASSIUM SERPL-MCNC: 6.3 MMOL/L — CRITICAL HIGH (ref 3.5–5)
POTASSIUM SERPL-SCNC: 5.6 MMOL/L — HIGH (ref 3.5–5)
POTASSIUM SERPL-SCNC: 6.3 MMOL/L — CRITICAL HIGH (ref 3.5–5)
RBC # BLD: 4.7 M/UL — SIGNIFICANT CHANGE UP (ref 4.2–5.4)
RBC # FLD: 17 % — HIGH (ref 11.5–14.5)
S PNEUM AG UR QL: NEGATIVE — SIGNIFICANT CHANGE UP
SODIUM SERPL-SCNC: 135 MMOL/L — SIGNIFICANT CHANGE UP (ref 135–146)
SODIUM SERPL-SCNC: 136 MMOL/L — SIGNIFICANT CHANGE UP (ref 135–146)
T4 AB SER-ACNC: 7.3 UG/DL — SIGNIFICANT CHANGE UP (ref 4.6–12)
TSH SERPL-MCNC: 0.3 UIU/ML — SIGNIFICANT CHANGE UP (ref 0.27–4.2)
WBC # BLD: 8.97 K/UL — SIGNIFICANT CHANGE UP (ref 4.8–10.8)
WBC # FLD AUTO: 8.97 K/UL — SIGNIFICANT CHANGE UP (ref 4.8–10.8)

## 2019-05-15 PROCEDURE — 93010 ELECTROCARDIOGRAM REPORT: CPT

## 2019-05-15 PROCEDURE — 74018 RADEX ABDOMEN 1 VIEW: CPT | Mod: 26

## 2019-05-15 RX ORDER — SODIUM POLYSTYRENE SULFONATE 4.1 MEQ/G
30 POWDER, FOR SUSPENSION ORAL ONCE
Refills: 0 | Status: COMPLETED | OUTPATIENT
Start: 2019-05-15 | End: 2019-05-15

## 2019-05-15 RX ORDER — INSULIN LISPRO 100/ML
10 VIAL (ML) SUBCUTANEOUS
Refills: 0 | Status: DISCONTINUED | OUTPATIENT
Start: 2019-05-15 | End: 2019-05-17

## 2019-05-15 RX ORDER — INSULIN HUMAN 100 [IU]/ML
10 INJECTION, SOLUTION SUBCUTANEOUS ONCE
Refills: 0 | Status: COMPLETED | OUTPATIENT
Start: 2019-05-15 | End: 2019-05-15

## 2019-05-15 RX ORDER — INSULIN GLARGINE 100 [IU]/ML
25 INJECTION, SOLUTION SUBCUTANEOUS EVERY MORNING
Refills: 0 | Status: DISCONTINUED | OUTPATIENT
Start: 2019-05-16 | End: 2019-05-17

## 2019-05-15 RX ORDER — MULTIVIT WITH MIN/MFOLATE/K2 340-15/3 G
1 POWDER (GRAM) ORAL ONCE
Refills: 0 | Status: COMPLETED | OUTPATIENT
Start: 2019-05-15 | End: 2019-05-15

## 2019-05-15 RX ORDER — ONDANSETRON 8 MG/1
4 TABLET, FILM COATED ORAL ONCE
Refills: 0 | Status: COMPLETED | OUTPATIENT
Start: 2019-05-15 | End: 2019-05-15

## 2019-05-15 RX ORDER — DEXTROSE 50 % IN WATER 50 %
50 SYRINGE (ML) INTRAVENOUS ONCE
Refills: 0 | Status: COMPLETED | OUTPATIENT
Start: 2019-05-15 | End: 2019-05-15

## 2019-05-15 RX ORDER — INSULIN HUMAN 100 [IU]/ML
5 INJECTION, SOLUTION SUBCUTANEOUS ONCE
Refills: 0 | Status: COMPLETED | OUTPATIENT
Start: 2019-05-15 | End: 2019-05-15

## 2019-05-15 RX ADMIN — Medication 7 UNIT(S): at 08:08

## 2019-05-15 RX ADMIN — GABAPENTIN 400 MILLIGRAM(S): 400 CAPSULE ORAL at 05:35

## 2019-05-15 RX ADMIN — GABAPENTIN 400 MILLIGRAM(S): 400 CAPSULE ORAL at 14:02

## 2019-05-15 RX ADMIN — Medication 5 MILLIGRAM(S): at 21:54

## 2019-05-15 RX ADMIN — INSULIN GLARGINE 20 UNIT(S): 100 INJECTION, SOLUTION SUBCUTANEOUS at 08:09

## 2019-05-15 RX ADMIN — CEFEPIME 100 MILLIGRAM(S): 1 INJECTION, POWDER, FOR SOLUTION INTRAMUSCULAR; INTRAVENOUS at 18:36

## 2019-05-15 RX ADMIN — ONDANSETRON 4 MILLIGRAM(S): 8 TABLET, FILM COATED ORAL at 12:20

## 2019-05-15 RX ADMIN — Medication 100 MILLIGRAM(S): at 05:34

## 2019-05-15 RX ADMIN — CEFEPIME 100 MILLIGRAM(S): 1 INJECTION, POWDER, FOR SOLUTION INTRAMUSCULAR; INTRAVENOUS at 05:31

## 2019-05-15 RX ADMIN — PANTOPRAZOLE SODIUM 40 MILLIGRAM(S): 20 TABLET, DELAYED RELEASE ORAL at 06:02

## 2019-05-15 RX ADMIN — GABAPENTIN 400 MILLIGRAM(S): 400 CAPSULE ORAL at 21:54

## 2019-05-15 RX ADMIN — Medication 100 MILLIGRAM(S): at 14:01

## 2019-05-15 RX ADMIN — INSULIN HUMAN 5 UNIT(S): 100 INJECTION, SOLUTION SUBCUTANEOUS at 12:40

## 2019-05-15 RX ADMIN — Medication 5 MILLIGRAM(S): at 14:07

## 2019-05-15 RX ADMIN — Medication 4: at 08:07

## 2019-05-15 RX ADMIN — Medication 25 MILLIGRAM(S): at 05:35

## 2019-05-15 RX ADMIN — Medication 10 UNIT(S): at 17:05

## 2019-05-15 RX ADMIN — Medication 1: at 17:05

## 2019-05-15 RX ADMIN — Medication 325 MILLIGRAM(S): at 12:20

## 2019-05-15 RX ADMIN — SODIUM POLYSTYRENE SULFONATE 30 GRAM(S): 4.1 POWDER, FOR SUSPENSION ORAL at 22:31

## 2019-05-15 RX ADMIN — Medication 1 BOTTLE: at 12:14

## 2019-05-15 RX ADMIN — Medication 25 MILLIGRAM(S): at 18:36

## 2019-05-15 RX ADMIN — HEPARIN SODIUM 5000 UNIT(S): 5000 INJECTION INTRAVENOUS; SUBCUTANEOUS at 21:53

## 2019-05-15 RX ADMIN — Medication 100 MILLIGRAM(S): at 21:53

## 2019-05-15 RX ADMIN — SENNA PLUS 2 TABLET(S): 8.6 TABLET ORAL at 21:54

## 2019-05-15 RX ADMIN — CLOPIDOGREL BISULFATE 75 MILLIGRAM(S): 75 TABLET, FILM COATED ORAL at 12:20

## 2019-05-15 RX ADMIN — ISOSORBIDE MONONITRATE 30 MILLIGRAM(S): 60 TABLET, EXTENDED RELEASE ORAL at 12:20

## 2019-05-15 RX ADMIN — Medication 6: at 12:10

## 2019-05-15 RX ADMIN — HEPARIN SODIUM 5000 UNIT(S): 5000 INJECTION INTRAVENOUS; SUBCUTANEOUS at 14:02

## 2019-05-15 RX ADMIN — Medication 50 MILLILITER(S): at 22:32

## 2019-05-15 RX ADMIN — HEPARIN SODIUM 5000 UNIT(S): 5000 INJECTION INTRAVENOUS; SUBCUTANEOUS at 05:35

## 2019-05-15 RX ADMIN — Medication 5 MILLIGRAM(S): at 05:35

## 2019-05-15 RX ADMIN — INSULIN HUMAN 10 UNIT(S): 100 INJECTION, SOLUTION SUBCUTANEOUS at 22:31

## 2019-05-15 RX ADMIN — ATORVASTATIN CALCIUM 40 MILLIGRAM(S): 80 TABLET, FILM COATED ORAL at 21:53

## 2019-05-15 NOTE — PROGRESS NOTE ADULT - ASSESSMENT
IMPRESSION:    Sepsis secondary to suspected GNR pneumonia/ Nose swab for MRSA neg  R/o Parapneumonic effusion  H/O RA on chronic prednisone therapy    PLAN:    ·	repeat CXR  ·	C/w abx, f/u cultures and urine for legionnella  ·	DVT ppx  ·	Supplemental oxygen to keep pox>92%, wean off as tolerated  ·	PUL STANDPOINT OP F/UP

## 2019-05-15 NOTE — PROGRESS NOTE ADULT - ASSESSMENT
· Assessment		  67 yo female patient with PMHx HTN, DM II, CAD/CABG x2 (last in 2013), DL, RA on chronic prednisone 15mg daily c/b osteoporosis and vertebral fractures s/p kyphoplasty surgeries (last done 10/'18) ,presents to hospital for fevers / sob x 1d, along with abd distention/discomfort/constipation.  IMPRESSION:  Resolved Sepsis secondary to acute GNR multilobar PNA MERLYN/lingula   Repeat CXR 5/13 with no change  Doubt PJP as presentation very acute  Immunosuppressed (on Prednisone 15 mg q24h for >one year ) with Ho negative PPD ( as per son )  Constipation  DINO  BCx 5/10, 11, 12 NTD  nares ORSA neg  UCx E coli. Asymptomatic bacteruria  Clinically improved    RECOMMENDATIONS;  Urine legionella / strep pneumonia antigen  Cefepime 2 gm iv q12h   Levoquin 500 mg iv q24h  Fungitell assay  F/u with pulmonary

## 2019-05-15 NOTE — PROGRESS NOTE ADULT - ASSESSMENT
This is a 68 year old female with a significant PMHx of CAD/CABG x2 (latest 2013), RA on prednisone, Chronic back pain 2/2 osteoporosis/vertebral fractures s/p kyphoplasty (10/2018), and T2DM presenting with a cc/o fever x1 day prior to presentation. She also complained of abdominal pain that radiated to the left chest wall, dull and pressure-like, and mildly alleviated with flatus. She had been constipated prior to presentation; noting she takes opioids regularly for her pain. In the ED, she had Tmax 103F, HR 95, and WBC 20. CTA Chest revealed MERLYN/Lingular consolidation consistent with multilobar PNA and contrast reflux into the IVC consisted with R-heart dysfunction. CT A/P negative for acute pathology. She was admitted with a working diagnosis of SEPSIS.    ===========================================================  Today/Updates:  Adjusted insulin for hyperglycemia again; follow up KUB for distension; possible DC in AM.  ===========================================================    Sepsis secondary to Multilobar, Gram-negative Fernando Pneumonia  - On presentation, Tmax 103; HR 95; WBC 20  - Immunosuppressed on Prednisone chronically for RA  - Started on IV Rocephin and Azithromycin; Discontinued  - Currently on Cefepime and Levofloxacin  - Blood NTD  - Urine Cx GNR with negative UA    Microcytic Anemia; Thalassemia trait  - Hgb stable  - Starting Iron tabs  - Follow up Heme/Onc as outpatient    Troponemia; Possible Type II NSTEMI; Hx of CAD/CABG, HTN, and DLD  - Troponin elevation in setting of DINO/CKD vs. CAD/CABG demand from sepsis  - Troponins stable without EKG changes  - Continue Plavix  - Contiue BB  - Holding Losartan given DINO  - Continue Satin  - Continue IMDUR    Chronic Back Pain 2/2 Osteoproris/Vertebral Fractures s/p Kyphoplasty  - Continue pain control; Continue bowel regimen  - Poor functional status at baseline  - Possible STR vs home with services    Chronic Kidney Disease   - Monitor BMP    Abdominal Pain secondary to Constipation  - Started on Doc/Senna and Miralax  - CT A/P negative    Rheumatoid Arthritis  - Continue Prednisone 15mg PO daily    Electrolyte Imbalances:   [X]  Hyponatremia; Resolved  [X]  Hyperkalemia; insulin given and follow up PM    GI ppx:                                   [X] Pantoprazole 40mg PO Daily    DVT ppx:  [X] Heparin 5000mg SubQ    Fluids:   [X] PO    Activity:  [X] Increase as Tolerated    BMI:  Height (cm): 160.02 (05-11)  Weight (kg): 70 (05-11)  BMI (kg/m2): 27.3 (05-11)    DISPO:  Patient to be discharged when condition(s) optimized.  [X] STR vs Home with services    [X] Discussion with patient and/or proxy regarding goals of care.  [X] Discussed Case and Plan with the Medical Attending.    CODE STATUS  [X] FULL    Please call Dr. Whitman [PGY-1] with any questions/consult recs: Spectra #[phone]

## 2019-05-15 NOTE — PROGRESS NOTE ADULT - ASSESSMENT
Sepsis due to  gram negative pneumonia   -sepsis present on admisison  -c/w cefepime, levaquin per ID   -check fungitell per ID recs   -MRSA PCR neg   -Blood cultures NTD     Metabolic encephalopathy resolved  secondary to sepsis from PNA   mental status back to baseline     Microcytic anemia  has DILLON started iron and  follow up as OPT for further work up   family says that she also has history of thalasemia     DINO on CKD III likely ATN from sepsis   scr stable at 1.6     #Hyperkalemia   5.6 give insulin and glucose   avoid keyexalate for now given abdominal discomfort   repeat potassium later today     Tropoenmia  likely demand ischemia from sepsis  echo normal EF     constipation and distention  :   abdominal exam benign   on senna add colace   miralax not helping per patient   will give mg citrate. check xray     PMR   -c/w prednisone    Hyponatremia  resolved     Dyslipidemia  -c/w lipitor    HTN   - was high this am recheck after am meds given. usually controlled     CAD   - c/w plavix, losartan on hold DINO, imdur, lipitor, metoprolol     DM II  -c/w insulin  -goal 120-180     DVT PPX    #Progress Note Handoff:  Pending: clinical improvement possibly in 24 hours if can switch to po abx   Family discussion: d/w daughter  at bedside  Disposition: home

## 2019-05-16 ENCOUNTER — TRANSCRIPTION ENCOUNTER (OUTPATIENT)
Age: 69
End: 2019-05-16

## 2019-05-16 LAB
ANION GAP SERPL CALC-SCNC: 12 MMOL/L — SIGNIFICANT CHANGE UP (ref 7–14)
ANION GAP SERPL CALC-SCNC: 15 MMOL/L — HIGH (ref 7–14)
BUN SERPL-MCNC: 23 MG/DL — HIGH (ref 10–20)
BUN SERPL-MCNC: 24 MG/DL — HIGH (ref 10–20)
CALCIUM SERPL-MCNC: 8.5 MG/DL — SIGNIFICANT CHANGE UP (ref 8.5–10.1)
CALCIUM SERPL-MCNC: 9.6 MG/DL — SIGNIFICANT CHANGE UP (ref 8.5–10.1)
CHLORIDE SERPL-SCNC: 103 MMOL/L — SIGNIFICANT CHANGE UP (ref 98–110)
CHLORIDE SERPL-SCNC: 105 MMOL/L — SIGNIFICANT CHANGE UP (ref 98–110)
CO2 SERPL-SCNC: 18 MMOL/L — SIGNIFICANT CHANGE UP (ref 17–32)
CO2 SERPL-SCNC: 18 MMOL/L — SIGNIFICANT CHANGE UP (ref 17–32)
CREAT SERPL-MCNC: 1.3 MG/DL — SIGNIFICANT CHANGE UP (ref 0.7–1.5)
CREAT SERPL-MCNC: 1.4 MG/DL — SIGNIFICANT CHANGE UP (ref 0.7–1.5)
CULTURE RESULTS: SIGNIFICANT CHANGE UP
GLUCOSE BLDC GLUCOMTR-MCNC: 207 MG/DL — HIGH (ref 70–99)
GLUCOSE BLDC GLUCOMTR-MCNC: 248 MG/DL — HIGH (ref 70–99)
GLUCOSE BLDC GLUCOMTR-MCNC: 370 MG/DL — HIGH (ref 70–99)
GLUCOSE BLDC GLUCOMTR-MCNC: 377 MG/DL — HIGH (ref 70–99)
GLUCOSE BLDC GLUCOMTR-MCNC: 417 MG/DL — HIGH (ref 70–99)
GLUCOSE SERPL-MCNC: 311 MG/DL — HIGH (ref 70–99)
GLUCOSE SERPL-MCNC: 401 MG/DL — HIGH (ref 70–99)
MAGNESIUM SERPL-MCNC: 2.6 MG/DL — HIGH (ref 1.8–2.4)
POTASSIUM SERPL-MCNC: 4.9 MMOL/L — SIGNIFICANT CHANGE UP (ref 3.5–5)
POTASSIUM SERPL-MCNC: 6.1 MMOL/L — CRITICAL HIGH (ref 3.5–5)
POTASSIUM SERPL-SCNC: 4.9 MMOL/L — SIGNIFICANT CHANGE UP (ref 3.5–5)
POTASSIUM SERPL-SCNC: 6.1 MMOL/L — CRITICAL HIGH (ref 3.5–5)
SODIUM SERPL-SCNC: 133 MMOL/L — LOW (ref 135–146)
SODIUM SERPL-SCNC: 138 MMOL/L — SIGNIFICANT CHANGE UP (ref 135–146)
SPECIMEN SOURCE: SIGNIFICANT CHANGE UP

## 2019-05-16 PROCEDURE — 71045 X-RAY EXAM CHEST 1 VIEW: CPT | Mod: 26

## 2019-05-16 RX ORDER — ISOSORBIDE MONONITRATE 60 MG/1
0 TABLET, EXTENDED RELEASE ORAL
Qty: 0 | Refills: 0 | DISCHARGE

## 2019-05-16 RX ORDER — FERROUS SULFATE 325(65) MG
1 TABLET ORAL
Qty: 30 | Refills: 0
Start: 2019-05-16 | End: 2019-06-14

## 2019-05-16 RX ORDER — METOPROLOL TARTRATE 50 MG
1 TABLET ORAL
Qty: 60 | Refills: 0
Start: 2019-05-16 | End: 2019-06-14

## 2019-05-16 RX ORDER — METOPROLOL TARTRATE 50 MG
0 TABLET ORAL
Qty: 180 | Refills: 0 | DISCHARGE

## 2019-05-16 RX ORDER — ISOSORBIDE MONONITRATE 60 MG/1
1 TABLET, EXTENDED RELEASE ORAL
Qty: 30 | Refills: 0
Start: 2019-05-16 | End: 2019-06-14

## 2019-05-16 RX ORDER — DEXTROSE 50 % IN WATER 50 %
50 SYRINGE (ML) INTRAVENOUS ONCE
Refills: 0 | Status: COMPLETED | OUTPATIENT
Start: 2019-05-16 | End: 2019-05-16

## 2019-05-16 RX ORDER — INSULIN HUMAN 100 [IU]/ML
5 INJECTION, SOLUTION SUBCUTANEOUS ONCE
Refills: 0 | Status: COMPLETED | OUTPATIENT
Start: 2019-05-16 | End: 2019-05-16

## 2019-05-16 RX ORDER — INSULIN LISPRO 100/ML
5 VIAL (ML) SUBCUTANEOUS ONCE
Refills: 0 | Status: COMPLETED | OUTPATIENT
Start: 2019-05-16 | End: 2019-05-16

## 2019-05-16 RX ORDER — RANITIDINE HYDROCHLORIDE 150 MG/1
1 TABLET, FILM COATED ORAL
Qty: 0 | Refills: 0 | DISCHARGE

## 2019-05-16 RX ORDER — INSULIN HUMAN 100 [IU]/ML
10 INJECTION, SOLUTION SUBCUTANEOUS ONCE
Refills: 0 | Status: COMPLETED | OUTPATIENT
Start: 2019-05-16 | End: 2019-05-16

## 2019-05-16 RX ORDER — METOCLOPRAMIDE HCL 10 MG
5 TABLET ORAL THREE TIMES A DAY
Refills: 0 | Status: COMPLETED | OUTPATIENT
Start: 2019-05-16 | End: 2019-05-17

## 2019-05-16 RX ADMIN — CEFEPIME 100 MILLIGRAM(S): 1 INJECTION, POWDER, FOR SOLUTION INTRAMUSCULAR; INTRAVENOUS at 05:12

## 2019-05-16 RX ADMIN — PANTOPRAZOLE SODIUM 40 MILLIGRAM(S): 20 TABLET, DELAYED RELEASE ORAL at 06:41

## 2019-05-16 RX ADMIN — Medication 5 MILLIGRAM(S): at 22:13

## 2019-05-16 RX ADMIN — INSULIN GLARGINE 25 UNIT(S): 100 INJECTION, SOLUTION SUBCUTANEOUS at 07:55

## 2019-05-16 RX ADMIN — GABAPENTIN 400 MILLIGRAM(S): 400 CAPSULE ORAL at 22:13

## 2019-05-16 RX ADMIN — Medication 5 MILLIGRAM(S): at 22:12

## 2019-05-16 RX ADMIN — Medication 25 MILLIGRAM(S): at 05:13

## 2019-05-16 RX ADMIN — Medication 5 MILLIGRAM(S): at 13:41

## 2019-05-16 RX ADMIN — INSULIN HUMAN 10 UNIT(S): 100 INJECTION, SOLUTION SUBCUTANEOUS at 04:29

## 2019-05-16 RX ADMIN — Medication 5 UNIT(S): at 19:46

## 2019-05-16 RX ADMIN — Medication 10 UNIT(S): at 07:55

## 2019-05-16 RX ADMIN — Medication 50 MILLILITER(S): at 04:29

## 2019-05-16 RX ADMIN — Medication 5 MILLIGRAM(S): at 05:13

## 2019-05-16 RX ADMIN — SENNA PLUS 2 TABLET(S): 8.6 TABLET ORAL at 22:14

## 2019-05-16 RX ADMIN — Medication 25 MILLIGRAM(S): at 18:04

## 2019-05-16 RX ADMIN — INSULIN HUMAN 5 UNIT(S): 100 INJECTION, SOLUTION SUBCUTANEOUS at 18:11

## 2019-05-16 RX ADMIN — Medication 10 UNIT(S): at 16:54

## 2019-05-16 RX ADMIN — GABAPENTIN 400 MILLIGRAM(S): 400 CAPSULE ORAL at 05:12

## 2019-05-16 RX ADMIN — Medication 5 MILLIGRAM(S): at 13:49

## 2019-05-16 RX ADMIN — ISOSORBIDE MONONITRATE 30 MILLIGRAM(S): 60 TABLET, EXTENDED RELEASE ORAL at 12:26

## 2019-05-16 RX ADMIN — Medication 5: at 12:15

## 2019-05-16 RX ADMIN — Medication 100 MILLIGRAM(S): at 22:12

## 2019-05-16 RX ADMIN — Medication 6: at 16:54

## 2019-05-16 RX ADMIN — Medication 100 MILLIGRAM(S): at 05:13

## 2019-05-16 RX ADMIN — Medication 325 MILLIGRAM(S): at 12:25

## 2019-05-16 RX ADMIN — Medication 2: at 07:54

## 2019-05-16 RX ADMIN — HEPARIN SODIUM 5000 UNIT(S): 5000 INJECTION INTRAVENOUS; SUBCUTANEOUS at 22:11

## 2019-05-16 RX ADMIN — Medication 100 MILLIGRAM(S): at 13:40

## 2019-05-16 RX ADMIN — ATORVASTATIN CALCIUM 40 MILLIGRAM(S): 80 TABLET, FILM COATED ORAL at 22:11

## 2019-05-16 RX ADMIN — GABAPENTIN 400 MILLIGRAM(S): 400 CAPSULE ORAL at 13:40

## 2019-05-16 RX ADMIN — CLOPIDOGREL BISULFATE 75 MILLIGRAM(S): 75 TABLET, FILM COATED ORAL at 12:26

## 2019-05-16 RX ADMIN — HEPARIN SODIUM 5000 UNIT(S): 5000 INJECTION INTRAVENOUS; SUBCUTANEOUS at 13:42

## 2019-05-16 RX ADMIN — HEPARIN SODIUM 5000 UNIT(S): 5000 INJECTION INTRAVENOUS; SUBCUTANEOUS at 05:12

## 2019-05-16 RX ADMIN — Medication 10 UNIT(S): at 12:15

## 2019-05-16 NOTE — DISCHARGE NOTE PROVIDER - NSDCCPCAREPLAN_GEN_ALL_CORE_FT
PRINCIPAL DISCHARGE DIAGNOSIS  Diagnosis: Sepsis due to pneumonia  Assessment and Plan of Treatment: On admission temperature was 103, heart rate was 95, and white blood cell count was 20. CXR show mulilobar pneumonia. IV antibiotics were started. Repeat CXR and blood cultures were performed until resolution. IV antibiotics were switched to ORAL and must be continued for the next 4 days.      SECONDARY DISCHARGE DIAGNOSES  Diagnosis: Abdominal pain  Assessment and Plan of Treatment: Likely due to constipation (side effect of pain medication taking for back pain). Given laxatives and improvement seen.    Diagnosis: Chronic back pain  Assessment and Plan of Treatment: History of osteoporosis, vertebral fractures. Continue home pain management and follow up with physical therapy outpatient.    Diagnosis: Elevated troponin  Assessment and Plan of Treatment: history of coronary artery disease, hypertension, and dyslipidemia. Repeat levels were check as well as EKGs, which have since been stable. continue taking home medications. follow up with cardiologist outpatient.    Diagnosis: Polymyalgia rheumatica  Assessment and Plan of Treatment: Continue taking home prednisone.    Diagnosis: Diabetes mellitus  Assessment and Plan of Treatment: Continue home insulin and keep a low carbohydrate/sugar diet.    Diagnosis: Microcytic anemia  Assessment and Plan of Treatment: History of Thalassemia trait. Hemoglobin stable. Follow up with Hematology outpatient. PRINCIPAL DISCHARGE DIAGNOSIS  Diagnosis: Sepsis due to pneumonia  Assessment and Plan of Treatment: On admission your temperature was 103F, heart rate was 95, and white blood cell count was 20.  CXR show mulilobar pneumonia. This was consistent with a diagnosis of sepsis due to multilobar pneumonia. IV antibiotics were started. Repeat CXR showed resolution, and blood cultures were negative throughout the admission. You improved significantly with IV antibiotics and are being discharged with oral antibiotics for 4 more days after discharge. Please continue increase your physical activity and use the incentive spirometer provided to you upon discharge.      SECONDARY DISCHARGE DIAGNOSES  Diagnosis: Abdominal pain  Assessment and Plan of Treatment: You initial abdominal pain was ikely due to constipation (side effect of pain medication). Imaginging revealed some distension consistent with gas. You had resolution of your constipation with laxatives.    Diagnosis: Chronic back pain  Assessment and Plan of Treatment: History of osteoporosis, vertebral fractures. Continue home pain management and follow up with physical therapy outpatient.    Diagnosis: Elevated troponin  Assessment and Plan of Treatment: Given your history of coronary artery disease, hypertension, and dyslipidemia we ruled out acute coronary syndrome. Repeat levels were check as well as EKGs, which have since stable. Continue taking the medications as prescribed and follow up with your cardiologist as an outpatient.    Diagnosis: Polymyalgia rheumatica  Assessment and Plan of Treatment: Continue taking home prednisone. You should follow up with a Rheumatolagist as an outpatient.    Diagnosis: Diabetes mellitus  Assessment and Plan of Treatment: Continue home insulin and keep a low carbohydrate/sugar diet. Follow up with your primary care doctor upon discharge.    Diagnosis: Microcytic anemia  Assessment and Plan of Treatment: History of Thalassemia trait. Hemoglobin stable. Follow up with Hematology outpatient and continue the iron pills prescribed upon discharge.

## 2019-05-16 NOTE — DISCHARGE NOTE PROVIDER - CARE PROVIDERS DIRECT ADDRESSES
,[email or web address],[email or web address] ,[email or web address],DirectAddress_Unknown,DirectAddress_Unknown

## 2019-05-16 NOTE — PROGRESS NOTE ADULT - ASSESSMENT
· Assessment		  67 yo female patient with PMHx HTN, DM II, CAD/CABG x2 (last in 2013), DL, RA on chronic prednisone 15mg daily c/b osteoporosis and vertebral fractures s/p kyphoplasty surgeries (last done 10/'18) ,presents to hospital for fevers / sob x 1d, along with abd distention/discomfort/constipation.  IMPRESSION:  Resolved Sepsis secondary to acute GNR multilobar PNA MERLYN/lingula   Repeat CXR 5/13 with no change  CXR 5/16 clearer  Constipation 9 diarrhea secondary to lactulose  DINO  BCx 5/10, 11, 12 NTD  nares ORSA neg  Legionella neg  HIV neg  UCx E coli. Asymptomatic bacteruria  Clinically improved    RECOMMENDATIONS;  Po Levoquin 500 mg q24h for 4 more days  recall prn please

## 2019-05-16 NOTE — DISCHARGE NOTE PROVIDER - INSTRUCTIONS
Low Carbohydrate and low sugar diet. Please follow the below diet:  Low fat and cholesterol  Low salt and sodium (less than 2 grams daily)  Low Carbohydrate and low sugar diet. Please follow the below diet:  Low fat and cholesterol  Low salt and sodium (less than 2 grams daily)  Low Carbohydrate and low sugar diet  Low potassium

## 2019-05-16 NOTE — PROGRESS NOTE ADULT - ASSESSMENT
This is a 68 year old female with a significant PMHx of CAD/CABG x2 (latest 2013), RA on prednisone, Chronic back pain 2/2 osteoporosis/vertebral fractures s/p kyphoplasty (10/2018), and T2DM presenting with a cc/o fever x1 day prior to presentation. She also complained of abdominal pain that radiated to the left chest wall, dull and pressure-like, and mildly alleviated with flatus. She had been constipated prior to presentation; noting she takes opioids regularly for her pain. In the ED, she had Tmax 103F, HR 95, and WBC 20. CTA Chest revealed MERLYN/Lingular consolidation consistent with multilobar PNA and contrast reflux into the IVC consisted with R-heart dysfunction. CT A/P negative for acute pathology. She was admitted with a working diagnosis of SEPSIS.    ===========================================================  Today/Updates:  Will discuss with ID changing to oral Abx (drug/dose/duration); Hyperglycemia again and will adjust Lispro; KUB with non-obstructive bowel gas patter; Generalized weakness likely due to some deconditioning; Discussed potential STR; however, family persistent about taking patient home with PT as OP; Orthostatics negative; possible DC.  ===========================================================    Sepsis secondary to Multilobar, Gram-negative Fernando Pneumonia  - On presentation, Tmax 103; HR 95; WBC 20  - Immunosuppressed on Prednisone chronically for RA  - Started on IV Rocephin and Azithromycin; Discontinued  - Currently on Cefepime and Levofloxacin; follow up with ID regarding course  - Blood NTD  - Urine Cx GNR with negative UA    Microcytic Anemia; Thalassemia trait  - Hgb stable  - Starting Iron tabs  - Follow up Heme/Onc as outpatient    Troponemia; Possible Type II NSTEMI; Hx of CAD/CABG, HTN, and DLD  - Troponin elevation in setting of DINO/CKD vs. CAD/CABG demand from sepsis  - Troponins stable without EKG changes  - Continue Plavix  - Continue BB  - Holding Losartan given DINO; may resume on DC with stable Creatinine  - Continue Satin  - Continue IMDUR    Chronic Back Pain 2/2 Osteoproris/Vertebral Fractures s/p Kyphoplasty  - Continue pain control; Continue bowel regimen  - Poor functional status at baseline  - Possible STR vs home with services    Chronic Kidney Disease   - Monitor BMP    Abdominal Pain secondary to Constipation  - Started on Doc/Senna and Miralax  - CT A/P negative    Rheumatoid Arthritis  - Continue Prednisone 15mg PO daily    Electrolyte Imbalances:   [X]  Hyponatremia; Resolved  [X]  Hyperkalemia; resolved    GI ppx:                                   [X] Pantoprazole 40mg PO Daily    DVT ppx:  [X] Heparin 5000mg SubQ    Fluids:   [X] PO    Activity:  [X] Increase as Tolerated    BMI:  Height (cm): 160.02 (05-11)  Weight (kg): 70 (05-11)  BMI (kg/m2): 27.3 (05-11)    DISPO:  Patient to be discharged when condition(s) optimized.  [X] STR vs Home with services    [X] Discussion with patient and/or proxy regarding goals of care.  [X] Discussed Case and Plan with the Medical Attending.    CODE STATUS  [X] FULL    Please call Dr. Whitman [PGY-1] with any questions/consult recs: Spectra #[phone] This is a 68 year old female with a significant PMHx of CAD/CABG x2 (latest 2013), RA on prednisone, Chronic back pain 2/2 osteoporosis/vertebral fractures s/p kyphoplasty (10/2018), and T2DM presenting with a cc/o fever x1 day prior to presentation. She also complained of abdominal pain that radiated to the left chest wall, dull and pressure-like, and mildly alleviated with flatus. She had been constipated prior to presentation; noting she takes opioids regularly for her pain. In the ED, she had Tmax 103F, HR 95, and WBC 20. CTA Chest revealed MERLYN/Lingular consolidation consistent with multilobar PNA and contrast reflux into the IVC consisted with R-heart dysfunction. CT A/P negative for acute pathology. She was admitted with a working diagnosis of SEPSIS.    ===========================================================  Today/Updates:  Will discuss with ID changing to oral Abx (drug/dose/duration); Hyperglycemia again and will adjust Lispro; KUB with non-obstructive bowel gas patter; Generalized weakness likely due to some deconditioning; Discussed potential STR; however, family persistent about taking patient home with PT as OP; Orthostatics negative; possible DC.  ===========================================================    Sepsis secondary to Multilobar, Gram-negative Fernando Pneumonia  - On presentation, Tmax 103; HR 95; WBC 20  - Immunosuppressed on Prednisone chronically for RA  - Started on IV Rocephin and Azithromycin; Discontinued  - Currently on Cefepime and Levofloxacin; follow up with ID regarding course  - Blood NTD  - Urine Cx GNR with negative UA    Microcytic Anemia; Thalassemia trait  - Hgb stable  - Starting Iron tabs  - Follow up Heme/Onc as outpatient    Troponemia; Possible Type II NSTEMI; Hx of CAD/CABG, HTN, and DLD  - Troponin elevation in setting of DINO/CKD vs. CAD/CABG demand from sepsis  - Troponins stable without EKG changes  - Continue Plavix  - Continue BB  - Holding Losartan given DINO; may resume on DC with stable Creatinine  - Continue Satin  - Continue IMDUR    Chronic Back Pain 2/2 Osteoproris/Vertebral Fractures s/p Kyphoplasty  - Continue pain control; Continue bowel regimen  - Poor functional status at baseline  - Possible STR vs home with services    Chronic Kidney Disease   - Monitor BMP    Abdominal Pain secondary to Constipation  - Started on Doc/Senna and Miralax  - CT A/P negative    Rheumatoid Arthritis vs. Polymyalgia rheumatica  - Family now say the patient does not have RA, but in fact has polymyalgia rheumatica  - Continue Prednisone 15mg PO daily    Electrolyte Imbalances:   [X]  Hyponatremia; Resolved  [X]  Hyperkalemia; resolved    GI ppx:                                   [X] Pantoprazole 40mg PO Daily    DVT ppx:  [X] Heparin 5000mg SubQ    Fluids:   [X] PO    Activity:  [X] Increase as Tolerated    BMI:  Height (cm): 160.02 (05-11)  Weight (kg): 70 (05-11)  BMI (kg/m2): 27.3 (05-11)    DISPO:  Patient to be discharged when condition(s) optimized.  [X] STR vs Home with services    [X] Discussion with patient and/or proxy regarding goals of care.  [X] Discussed Case and Plan with the Medical Attending.    CODE STATUS  [X] FULL    Please call Dr. Whitman [PGY-1] with any questions/consult recs: Spectra #[phone]

## 2019-05-16 NOTE — DISCHARGE NOTE PROVIDER - PROVIDER TOKENS
PROVIDER:[TOKEN:[[id number]]],PROVIDER:[TOKEN:[[id number]]],PROVIDER:[TOKEN:[[id number]]],PROVIDER:[TOKEN:[[id number]]] PROVIDER:[TOKEN:[[id number]]],PROVIDER:[TOKEN:[[id number]]],PROVIDER:[TOKEN:[[id number]]],PROVIDER:[TOKEN:[[id number]]]

## 2019-05-16 NOTE — PROGRESS NOTE ADULT - ATTENDING COMMENTS
Patient seen and examined independently. I agree with the resident's note, physical exam, and plan except as below.  Vital Signs Last 24 Hrs  T(C): 37.1 (16 May 2019 12:47), Max: 37.1 (16 May 2019 12:47)  T(F): 98.7 (16 May 2019 12:47), Max: 98.7 (16 May 2019 12:47)  HR: 73 (16 May 2019 12:47) (70 - 83)  BP: 160/81 (16 May 2019 12:47) (121/68 - 160/81)  BP(mean): --  RR: 18 (16 May 2019 12:47) (18 - 18)  SpO2: 98% (15 May 2019 20:21) (98% - 98%)  Pe  nad  [id number]  [id number]  ctbl  soft ndntn+bd  no cce    ROS: dizziness upon standing, bilateral lateral abdominal pain, early satiety   spoke with brother who is a physician  -  previously had gastropariesis with similar symptoms    # sepsis poa - GNR multilobar PNA - cont current abxs - on dc can change to po levaquin , mrsa, legionella, pneumococcal negative  seen by ID and Pulm   bcx neg    #abd pain - intially thought to be constipation - but pt is now having loose stools due to laxatives  -may be gastropareisis related - trial of reglan     #hx of PMR - on chronic prednisone use 15mg daily    #dizziness - check orthostatics - if positive then start low rate ivfs    pt states she doesn't feel well enough to be dc today    #Progress Note Handoff  Pending (specify):  Consults_________, Tests___orthostatics_____, Test Results_______, Other_________  Family discussion: done   Disposition: Home___xxx/SNF___/Other________/Unknown at this time________

## 2019-05-16 NOTE — DISCHARGE NOTE NURSING/CASE MANAGEMENT/SOCIAL WORK - NSDCDPATPORTLINK_GEN_ALL_CORE
You can access the [facility] Patient Portal, offered by [facility], by registering with the following website: [email or web address]

## 2019-05-16 NOTE — DISCHARGE NOTE PROVIDER - HOSPITAL COURSE
68 year old female with a past medical history of CAD/CABG x2 (latest 2013), Polymyalgia Rheumatica on prednisone, Chronic back pain secondary to osteoporosis/vertebral fractures s/p kyphoplasty (10/2018), and T2DM presented to the hospital with a chief complaint of a fever x1 day prior to presentation, as well as abdominal pain that radiated to the left chest wall, which she described as dull and pressure-like, and mildly alleviated with flatus. In the ED, she had Tmax 103F, HR 95, and WBC 20. CTA Chest revealed MERLYN/Lingular consolidation consistent with multilobar pneumobia and contrast reflux into the IVC consisted with R-heart dysfunction. CT A/P negative for acute pathology. She was admitted with a working diagnosis of SEPSIS and started on IV Rocephin and Erythromycin, which was later switched to IV Cefepime and Levofloxacin. Repeat Chest X-ray from 5/16 was clearer showing signifying resolution and blood cultures done and have been negative to date. Patient has clinically improved and cleared for discharged on oral Levoquin 500mg for the next four days. Potential short term rehab was discussed, however, family was persistent about taking patient home with outpatient physical therapy. 68 year old female with a past medical history of CAD/CABG x2 (latest 2013), Polymyalgia Rheumatica on prednisone, Chronic back pain secondary to osteoporosis/vertebral fractures s/p kyphoplasty (10/2018), and T2DM presented to the hospital with a chief complaint of a fever x1 day prior to presentation, as well as abdominal pain that radiated to the left chest wall, which she described as dull and pressure-like, and mildly alleviated with flatus. In the ED, she had Tmax 103F, HR 95, and WBC 20. CTA Chest revealed MERLYN/Lingular consolidation consistent with multilobar pneumobia and contrast reflux into the IVC consisted with R-heart dysfunction. CT A/P negative for acute pathology. She was admitted with a working diagnosis of SEPSIS and started on IV Rocephin and Erythromycin, which was later switched to IV Cefepime and Levofloxacin. Repeat Chest X-ray from 5/16 showing signifying resolution and blood cultures were negative throughout admission. Patient has clinically improved and cleared for discharged on oral Levoquin 500mg for the next four days. Potential short term rehab was discussed, however, family was persistent about taking patient home with outpatient physical therapy.

## 2019-05-16 NOTE — DISCHARGE NOTE PROVIDER - CARE PROVIDER_API CALL
Esther Gonzales)  Internal Medicine; Rheumatology  [address] [place] [address]  Phone: [phone]  Fax: [phone]  Follow Up Time:     Carolyn Armenta)  HematologyOncology; Internal Medicine; Medical Oncology  [address] [address]  Phone: [phone]  Fax: [phone]  Follow Up Time:     Barbie Pena (DO)  Geriatric Medicine; Internal Medicine  [address] [place] [address]  Phone: [phone]  Fax: [phone]  Follow Up Time:     Kuldeep Alonzo)  Cardiovascular Disease; Internal Medicine  [address] [place] [address]  Phone: [phone]  Fax: [phone]  Follow Up Time: Esther Gonzales)  Internal Medicine; Rheumatology  [address] [place] [address]  Phone: [phone]  Fax: [phone]  Follow Up Time:     Carolyn Armenta)  HematologyOncology; Internal Medicine; Medical Oncology  [address] [address]  Phone: [phone]  Fax: [phone]  Follow Up Time:     Kuldeep Alonzo)  Cardiovascular Disease; Internal Medicine  [address] [place] [address]  Phone: [phone]  Fax: [phone]  Follow Up Time:     Radha Fernández (DO)  Internal Medicine  [address] [place] [address]  Phone: [phone]  Fax: [phone]  Follow Up Time:

## 2019-05-17 ENCOUNTER — INBOUND DOCUMENT (OUTPATIENT)
Age: 69
End: 2019-05-17

## 2019-05-17 VITALS
SYSTOLIC BLOOD PRESSURE: 179 MMHG | HEART RATE: 73 BPM | TEMPERATURE: 98 F | DIASTOLIC BLOOD PRESSURE: 80 MMHG | RESPIRATION RATE: 18 BRPM

## 2019-05-17 LAB
ANION GAP SERPL CALC-SCNC: 13 MMOL/L — SIGNIFICANT CHANGE UP (ref 7–14)
BUN SERPL-MCNC: 28 MG/DL — HIGH (ref 10–20)
CALCIUM SERPL-MCNC: 9.7 MG/DL — SIGNIFICANT CHANGE UP (ref 8.5–10.1)
CHLORIDE SERPL-SCNC: 105 MMOL/L — SIGNIFICANT CHANGE UP (ref 98–110)
CO2 SERPL-SCNC: 19 MMOL/L — SIGNIFICANT CHANGE UP (ref 17–32)
CREAT SERPL-MCNC: 1.3 MG/DL — SIGNIFICANT CHANGE UP (ref 0.7–1.5)
CULTURE RESULTS: SIGNIFICANT CHANGE UP
FUNGITELL: 36 PG/ML — SIGNIFICANT CHANGE UP
FUNGITELL: <31 PG/ML — SIGNIFICANT CHANGE UP
GLUCOSE BLDC GLUCOMTR-MCNC: 145 MG/DL — HIGH (ref 70–99)
GLUCOSE BLDC GLUCOMTR-MCNC: 264 MG/DL — HIGH (ref 70–99)
GLUCOSE BLDC GLUCOMTR-MCNC: 331 MG/DL — HIGH (ref 70–99)
GLUCOSE BLDC GLUCOMTR-MCNC: 395 MG/DL — HIGH (ref 70–99)
GLUCOSE SERPL-MCNC: 218 MG/DL — HIGH (ref 70–99)
POTASSIUM SERPL-MCNC: 5.3 MMOL/L — HIGH (ref 3.5–5)
POTASSIUM SERPL-SCNC: 5.3 MMOL/L — HIGH (ref 3.5–5)
SODIUM SERPL-SCNC: 137 MMOL/L — SIGNIFICANT CHANGE UP (ref 135–146)
SPECIMEN SOURCE: SIGNIFICANT CHANGE UP

## 2019-05-17 RX ORDER — NIFEDIPINE 30 MG
30 TABLET, EXTENDED RELEASE 24 HR ORAL DAILY
Refills: 0 | Status: DISCONTINUED | OUTPATIENT
Start: 2019-05-17 | End: 2019-05-17

## 2019-05-17 RX ORDER — INSULIN GLARGINE 100 [IU]/ML
5 INJECTION, SOLUTION SUBCUTANEOUS ONCE
Refills: 0 | Status: DISCONTINUED | OUTPATIENT
Start: 2019-05-17 | End: 2019-05-17

## 2019-05-17 RX ORDER — INSULIN LISPRO 100/ML
14 VIAL (ML) SUBCUTANEOUS
Refills: 0 | Status: DISCONTINUED | OUTPATIENT
Start: 2019-05-17 | End: 2019-05-17

## 2019-05-17 RX ORDER — NIFEDIPINE 30 MG
1 TABLET, EXTENDED RELEASE 24 HR ORAL
Qty: 30 | Refills: 0
Start: 2019-05-17 | End: 2019-06-15

## 2019-05-17 RX ORDER — LOSARTAN POTASSIUM 100 MG/1
50 TABLET, FILM COATED ORAL DAILY
Refills: 0 | Status: DISCONTINUED | OUTPATIENT
Start: 2019-05-17 | End: 2019-05-17

## 2019-05-17 RX ORDER — LOSARTAN POTASSIUM 100 MG/1
1 TABLET, FILM COATED ORAL
Qty: 0 | Refills: 0 | DISCHARGE

## 2019-05-17 RX ORDER — INSULIN GLARGINE 100 [IU]/ML
30 INJECTION, SOLUTION SUBCUTANEOUS ONCE
Refills: 0 | Status: COMPLETED | OUTPATIENT
Start: 2019-05-17 | End: 2019-05-17

## 2019-05-17 RX ORDER — INSULIN GLARGINE 100 [IU]/ML
30 INJECTION, SOLUTION SUBCUTANEOUS EVERY MORNING
Refills: 0 | Status: DISCONTINUED | OUTPATIENT
Start: 2019-05-18 | End: 2019-05-17

## 2019-05-17 RX ADMIN — HEPARIN SODIUM 5000 UNIT(S): 5000 INJECTION INTRAVENOUS; SUBCUTANEOUS at 05:44

## 2019-05-17 RX ADMIN — Medication 30 MILLIGRAM(S): at 12:12

## 2019-05-17 RX ADMIN — Medication 14 UNIT(S): at 12:09

## 2019-05-17 RX ADMIN — Medication 3: at 12:10

## 2019-05-17 RX ADMIN — Medication 5 MILLIGRAM(S): at 05:43

## 2019-05-17 RX ADMIN — Medication 325 MILLIGRAM(S): at 12:14

## 2019-05-17 RX ADMIN — Medication 100 MILLIGRAM(S): at 13:50

## 2019-05-17 RX ADMIN — Medication 5 MILLIGRAM(S): at 05:45

## 2019-05-17 RX ADMIN — PANTOPRAZOLE SODIUM 40 MILLIGRAM(S): 20 TABLET, DELAYED RELEASE ORAL at 05:44

## 2019-05-17 RX ADMIN — Medication 5: at 09:21

## 2019-05-17 RX ADMIN — Medication 5 MILLIGRAM(S): at 13:50

## 2019-05-17 RX ADMIN — INSULIN GLARGINE 30 UNIT(S): 100 INJECTION, SOLUTION SUBCUTANEOUS at 12:10

## 2019-05-17 RX ADMIN — Medication 25 MILLIGRAM(S): at 05:43

## 2019-05-17 RX ADMIN — Medication 14 UNIT(S): at 09:20

## 2019-05-17 RX ADMIN — Medication 100 MILLIGRAM(S): at 05:43

## 2019-05-17 RX ADMIN — HEPARIN SODIUM 5000 UNIT(S): 5000 INJECTION INTRAVENOUS; SUBCUTANEOUS at 13:50

## 2019-05-17 RX ADMIN — ISOSORBIDE MONONITRATE 30 MILLIGRAM(S): 60 TABLET, EXTENDED RELEASE ORAL at 12:14

## 2019-05-17 RX ADMIN — CLOPIDOGREL BISULFATE 75 MILLIGRAM(S): 75 TABLET, FILM COATED ORAL at 12:12

## 2019-05-17 RX ADMIN — GABAPENTIN 400 MILLIGRAM(S): 400 CAPSULE ORAL at 13:50

## 2019-05-17 RX ADMIN — GABAPENTIN 400 MILLIGRAM(S): 400 CAPSULE ORAL at 05:44

## 2019-05-17 NOTE — PROGRESS NOTE ADULT - PROVIDER SPECIALTY LIST ADULT
Hospitalist
Infectious Disease
Internal Medicine
Hospitalist
Pulmonology
Infectious Disease

## 2019-05-17 NOTE — PROGRESS NOTE ADULT - ASSESSMENT
Sepsis due to  gram negative pneumonia resolved  -sepsis present on admission   -c/w levaquin po to complete course at home       Metabolic encephalopathy resolved  secondary to sepsis from PNA   mental status back to baseline     Microcytic anemia  has DILLON started iron and  follow up as OPT for further work up   family says that she also has history of thalasemia     DINO on CKD III likely ATN from sepsis   improved   no metformin on discharge     #DM with hyperglycemia patient on chronic steroids   she is only getting half of her home dose insulin   will resume levemer 30 BID   Januvia   no metformin on discharge given DINO    #Hyperkalemia   resolved      Tropoenmia  likely demand ischemia from sepsis  echo normal EF     PMR   -c/w prednisone    Hyponatremia  resolved     Dyslipidemia  -c/w lipitor    HTN   better this morning but not ideal ARB stopped for DINO   will add procardia 30 qday for better BP control     CAD   - c/w plavix, losartan on hold DINO, imdur, lipitor, metoprolol, since ARB was stopped will add 30 mg of procardia for better BP control     DM II  -c/w insulin  -goal 120-180     DVT PPX    #Progress Note Handoff:  Pending: none   Family discussion: d/w daughter  at bedside  Disposition: home today     spent 35 min on discharge

## 2019-05-17 NOTE — PROGRESS NOTE ADULT - SUBJECTIVE AND OBJECTIVE BOX
MALLIKA WILLSON  68y, Female      OVERNIGHT EVENTS:    no fevers, SOB reduced, reduced cough, no chest pain  diarrhea ( on Lactulose )    VITALS:  T(F): 98, Max: 98.4 (05-15-19 @ 20:08)  HR: 82  BP: 139/76  RR: 18Vital Signs Last 24 Hrs  T(C): 36.7 (16 May 2019 05:55), Max: 36.9 (15 May 2019 20:08)  T(F): 98 (16 May 2019 05:55), Max: 98.4 (15 May 2019 20:08)  HR: 82 (16 May 2019 05:55) (70 - 83)  BP: 139/76 (16 May 2019 05:55) (121/68 - 173/75)  BP(mean): --  RR: 18 (16 May 2019 05:55) (18 - 18)  SpO2: 98% (15 May 2019 20:21) (98% - 98%)    TESTS & MEASUREMENTS:                        8.5    8.97  )-----------( 250      ( 15 May 2019 05:47 )             27.7     05-16    138  |  105  |  23<H>  ----------------------------<  311<H>  4.9   |  18  |  1.4    Ca    9.6      16 May 2019 05:37  Mg     2.6     05-16          Culture - Blood (collected 05-12-19 @ 11:04)  Source: .Blood None  Preliminary Report (05-13-19 @ 19:02):    No growth to date.    Culture - Blood (collected 05-11-19 @ 07:11)  Source: .Blood None  Preliminary Report (05-12-19 @ 17:00):    No growth to date.    Culture - Blood (collected 05-11-19 @ 07:11)  Source: .Blood None  Preliminary Report (05-12-19 @ 17:00):    No growth to date.    Culture - Urine (collected 05-10-19 @ 20:05)  Source: .Urine Clean Catch (Midstream)  Final Report (05-13-19 @ 09:11):    10,000 - 49,000 CFU/mL Escherichia coli  Organism: Escherichia coli (05-13-19 @ 09:11)  Organism: Escherichia coli (05-13-19 @ 09:11)      -  Amikacin: S <=16      -  Ampicillin: S <=8 These ampicillin results predict results for amoxicillin      -  Ampicillin/Sulbactam: S <=8/4      -  Aztreonam: S <=4      -  Cefazolin: S <=8 For uncomplicated UTI with K. pneumoniae, E. coli, or P. mirablis: EMIL <=16 is sensitive and EMIL >=32 is resistant. This also predicts results for oral agents cefaclor, cefdinir, cefpodoxime, cefprozil, cefuroxime axetil, cephalexin and locarbef for uncomplicated UTI. Note that some isolates may be susceptible to these agents while testing resistant to cefazolin.      -  Cefepime: S <=4      -  Cefoxitin: S <=8      -  Ceftriaxone: S <=1 Enterobacter, Citrobacter, and Serratia may develop resistance during prolonged therapy      -  Ciprofloxacin: S <=1      -  Ertapenem: S <=1      -  Gentamicin: S <=4      -  Imipenem: S <=1      -  Levofloxacin: S <=2      -  Meropenem: S <=1      -  Nitrofurantoin: S <=32 Should not be used to treat pyelonephritis      -  Piperacillin/Tazobactam: S <=16      -  Tigecycline: S <=2      -  Tobramycin: S <=4      -  Trimethoprim/Sulfamethoxazole: S <=2/38      Method Type: EMIL    Culture - Blood (collected 05-10-19 @ 18:12)  Source: .Blood Blood-Venous  Final Report (05-16-19 @ 02:02):    No growth at 5 days.    Culture - Blood (collected 05-10-19 @ 18:12)  Source: .Blood Blood-Venous  Final Report (05-16-19 @ 02:02):    No growth at 5 days.            RADIOLOGY & ADDITIONAL TESTS:    ANTIBIOTICS:  cefepime   IVPB      cefepime   IVPB 2000 milliGRAM(s) IV Intermittent every 12 hours  levoFLOXacin IVPB 500 milliGRAM(s) IV Intermittent every 24 hours  levoFLOXacin IVPB
OVERNIGHT EVENTS: feels better, cough, no fever    Vital Signs Last 24 Hrs  T(C): 36.7 (15 May 2019 05:33), Max: 36.7 (15 May 2019 05:33)  T(F): 98 (15 May 2019 05:33), Max: 98 (15 May 2019 05:33)  HR: 70 (15 May 2019 05:33) (70 - 80)  BP: 177/79 (15 May 2019 05:33) (131/66 - 177/79)  RR: 18 (15 May 2019 05:33) (16 - 18)  SpO2: 97% (14 May 2019 20:09) (97% - 97%)    PHYSICAL EXAMINATION:    GENERAL: The patient is awake and alert in no apparent distress.     HEENT: Head is normocephalic and atraumatic. Extraocular muscles are intact. Mucous membranes are moist.    NECK: Supple.    LUNGS: l side rhonchi    HEART: Regular rate and rhythm without murmur.    ABDOMEN: Soft, nontender, and nondistended.      EXTREMITIES: Without any cyanosis, clubbing, rash, lesions or edema.    NEUROLOGIC: Grossly intact.    SKIN: No ulceration or induration present.      LABS:                        8.5    8.97  )-----------( 250      ( 15 May 2019 05:47 )             27.7     05-15    135  |  103  |  21<H>  ----------------------------<  250<H>  5.6<H>   |  19  |  1.6<H>    Ca    8.8      15 May 2019 05:47  Phos  2.4     05-14  Mg     2.5     05-15                            05-14-19 @ 07:01  -  05-15-19 @ 07:00  --------------------------------------------------------  IN: 290 mL / OUT: 250 mL / NET: 40 mL        MICROBIOLOGY:  Culture Results:   No growth to date. (05-12 @ 11:04)      MEDICATIONS  (STANDING):  atorvastatin 40 milliGRAM(s) Oral at bedtime  cefepime   IVPB      cefepime   IVPB 2000 milliGRAM(s) IV Intermittent every 12 hours  clopidogrel Tablet 75 milliGRAM(s) Oral daily  dextrose 5%. 1000 milliLiter(s) (50 mL/Hr) IV Continuous <Continuous>  dextrose 50% Injectable 12.5 Gram(s) IV Push once  dextrose 50% Injectable 25 Gram(s) IV Push once  dextrose 50% Injectable 25 Gram(s) IV Push once  docusate sodium 100 milliGRAM(s) Oral three times a day  ferrous    sulfate 325 milliGRAM(s) Oral daily  gabapentin 400 milliGRAM(s) Oral three times a day  heparin  Injectable 5000 Unit(s) SubCutaneous every 8 hours  insulin glargine Injectable (LANTUS) 20 Unit(s) SubCutaneous every morning  insulin lispro (HumaLOG) corrective regimen sliding scale   SubCutaneous three times a day before meals  insulin lispro Injectable (HumaLOG) 7 Unit(s) SubCutaneous three times a day before meals  isosorbide   mononitrate ER Tablet (IMDUR) 30 milliGRAM(s) Oral daily  levoFLOXacin IVPB 500 milliGRAM(s) IV Intermittent every 24 hours  levoFLOXacin IVPB      metoprolol tartrate 25 milliGRAM(s) Oral two times a day  pantoprazole    Tablet 40 milliGRAM(s) Oral before breakfast  polyethylene glycol 3350 17 Gram(s) Oral daily  predniSONE   Tablet 5 milliGRAM(s) Oral three times a day  senna 2 Tablet(s) Oral at bedtime    MEDICATIONS  (PRN):  acetaminophen   Tablet .. 650 milliGRAM(s) Oral every 6 hours PRN Temp greater or equal to 38C (100.4F)  aluminum hydroxide/magnesium hydroxide/simethicone Suspension 30 milliLiter(s) Oral every 6 hours PRN Dyspepsia  dextrose 40% Gel 15 Gram(s) Oral once PRN Blood Glucose LESS THAN 70 milliGRAM(s)/deciliter  glucagon  Injectable 1 milliGRAM(s) IntraMuscular once PRN Glucose LESS THAN 70 milligrams/deciliter  oxyCODONE    IR 5 milliGRAM(s) Oral three times a day PRN Moderate Pain (4 - 6)      RADIOLOGY & ADDITIONAL STUDIES:
CHIEF COMPLAINT:    Patient is a 68y old  Female who presents with a chief complaint of     INTERVAL HPI/OVERNIGHT EVENTS:    Patient seen and examined at bedside. No acute overnight events occurred.    ROS: All other systems are negative.    Vital Signs:    T(F): 97.9 (05-12-19 @ 14:06), Max: 101.2 (05-12-19 @ 01:01)  HR: 86 (05-12-19 @ 14:06) (76 - 92)  BP: 152/70 (05-12-19 @ 14:06) (125/67 - 152/70)  RR: 18 (05-12-19 @ 14:06) (17 - 18)  SpO2: 96% (05-12-19 @ 13:01) (96% - 97%)  I&O's Summary    11 May 2019 07:01  -  12 May 2019 07:00  --------------------------------------------------------  IN: 490 mL / OUT: 0 mL / NET: 490 mL      Daily     Daily   CAPILLARY BLOOD GLUCOSE      POCT Blood Glucose.: 259 mg/dL (12 May 2019 11:33)  POCT Blood Glucose.: 297 mg/dL (12 May 2019 07:37)  POCT Blood Glucose.: 285 mg/dL (11 May 2019 22:15)  POCT Blood Glucose.: 274 mg/dL (11 May 2019 17:09)      PHYSICAL EXAM:  GENERAL:  NAD  SKIN: No rashes or lesions  HEENT: Atraumatic. Normocephalic. Anicteric  NECK:  No JVD.   PULMONARY: Clear to ausculation bilaterally. No wheezing. No rales  CVS: Normal S1, S2. Regular rate and rhythm. No murmurs.  ABDOMEN/GI: Soft, Nontender, Nondistended; Bowel sounds are present  EXTREMITIES:  No edema B/L LE.  NEUROLOGIC:  No motor deficit.  PSYCH: Alert & oriented x 3, normal affect    Consultant(s) Notes Reviewed:  [x ] YES  [ ] NO  Care Discussed with Consultants/Other Providers [ x] YES  [ ] NO    LABS:                        7.5    12.59 )-----------( 188      ( 12 May 2019 11:04 )             24.7     05-12    131<L>  |  98  |  22<H>  ----------------------------<  250<H>  4.5   |  20  |  1.6<H>    Ca    8.1<L>      12 May 2019 11:04  Mg     2.0     05-12    TPro  5.8<L>  /  Alb  3.3<L>  /  TBili  0.3  /  DBili  0.2  /  AST  31  /  ALT  20  /  AlkPhos  76  05-10    PT/INR - ( 10 May 2019 18:12 )   PT: 12.60 sec;   INR: 1.10 ratio         PTT - ( 10 May 2019 18:12 )  PTT:27.7 sec  Serum Pro-Brain Natriuretic Peptide: 1874 pg/mL (05-10-19 @ 18:12)    Trop 0.02, CKMB <1.0, CK 68, 05-11-19 @ 07:11  Trop 0.03, CKMB <1.0, CK 46, 05-10-19 @ 18:12      Culture - Urine (collected 10 May 2019 20:05)  Source: .Urine Clean Catch (Midstream)  Preliminary Report (12 May 2019 07:43):    10,000 - 49,000 CFU/mL Gram Negative Rods    Culture - Blood (collected 10 May 2019 18:12)  Source: .Blood Blood-Venous  Preliminary Report (12 May 2019 03:50):    No growth to date.    Culture - Blood (collected 10 May 2019 18:12)  Source: .Blood Blood-Venous  Preliminary Report (12 May 2019 03:50):    No growth to date.        RADIOLOGY & ADDITIONAL TESTS:  Imaging or report Personally Reviewed:  [ ] YES  [ ] NO    EKG reviewed independently    Medications:  Standing  atorvastatin 40 milliGRAM(s) Oral at bedtime  cefepime   IVPB      cefepime   IVPB 2000 milliGRAM(s) IV Intermittent every 12 hours  clopidogrel Tablet 75 milliGRAM(s) Oral daily  docusate sodium 100 milliGRAM(s) Oral three times a day  gabapentin 400 milliGRAM(s) Oral three times a day  heparin  Injectable 5000 Unit(s) SubCutaneous every 8 hours  insulin glargine Injectable (LANTUS) 30 Unit(s) SubCutaneous two times a day  isosorbide   mononitrate ER Tablet (IMDUR) 30 milliGRAM(s) Oral daily  levoFLOXacin IVPB      losartan 50 milliGRAM(s) Oral daily  metoprolol tartrate 25 milliGRAM(s) Oral two times a day  pantoprazole    Tablet 40 milliGRAM(s) Oral before breakfast  polyethylene glycol 3350 17 Gram(s) Oral daily  predniSONE   Tablet 5 milliGRAM(s) Oral three times a day  senna 2 Tablet(s) Oral at bedtime  sodium chloride 0.9%. 1000 milliLiter(s) IV Continuous <Continuous>    PRN Meds  acetaminophen   Tablet .. 650 milliGRAM(s) Oral every 6 hours PRN  aluminum hydroxide/magnesium hydroxide/simethicone Suspension 30 milliLiter(s) Oral every 6 hours PRN  oxyCODONE    IR 5 milliGRAM(s) Oral three times a day PRN      Case discussed with resident  Care discussed with pt
DAILY PROGRESS NOTE  ===========================================================    Patient Information:  MALLIKA WILLSON  /  68y  /  Female  /  MRN#: [id number]    Hospital Day: 2d     |:::::::::::::::::::::::::::| SUBJECTIVE |:::::::::::::::::::::::::::|    OVERNIGHT EVENTS: None  TODAY: Patient was seen today at bedside. Patient was not feeling well this morning and tachypneic with a bedside RR roughly 30-35/min. She was also complaining of a dry cough that is new, as well as worsened abdominal swelling. Review of systems is otherwise negative.    |:::::::::::::::::::::::::::| OBJECTIVE |:::::::::::::::::::::::::::|    VITAL SIGNS: Last 24 Hours  T(F): 96.7 (13 May 2019 13:20), Max: 97.9 (12 May 2019 14:06)  HR: 87 (13 May 2019 13:20) (71 - 87)  BP: 131/70 (13 May 2019 13:20) (131/70 - 183/86)  BP(mean): 123 (12 May 2019 20:30) (123 - 123)  RR: 18 (13 May 2019 13:20) (18 - 24)  SpO2: 98% (13 May 2019 06:14) (97% - 98%)    05-12-19 @ 07:01  -  05-13-19 @ 07:00  --------------------------------------------------------  IN: 610 mL / OUT: 1150 mL / NET: -540 mL    05-13-19 @ 07:01  -  05-13-19 @ 13:50  --------------------------------------------------------  IN: 360 mL / OUT: 900 mL / NET: -540 mL    PHYSICAL EXAM:  GENERAL:   Awake, alert; NAD.  HEENT:  Head NC/AT; Conjunctivae pink, Sclera anicteric.  CARDIO:   Regular rate; Regular rhythm; S1 & S2.  RESP:   Tachypnea; Decreased sounds at bases.  GI:   Soft; NT/ND; BS; Some ascites.   EXT:   No edema in UE and LE.  NEURO:   PERRL.  SKIN:   Intact.    LAB RESULTS:                        7.5    12.59 )-----------( 188      ( 12 May 2019 11:04 )             24.7     131<L>  |  98  |  22<H>  ----------------------------<  250<H>  4.5   |  20  |  1.6<H>    Ca    8.1<L>      12 May 2019 11:04  Mg     2.0     05-12    MICROBIOLOGY:    Culture - Blood (collected 11 May 2019 07:11)  Source: .Blood None  Preliminary Report (12 May 2019 17:00):    No growth to date.    Culture - Blood (collected 11 May 2019 07:11)  Source: .Blood None  Preliminary Report (12 May 2019 17:00):    No growth to date.    Culture - Urine (collected 10 May 2019 20:05)  Source: .Urine Clean Catch (Midstream)  Final Report (13 May 2019 09:11):    10,000 - 49,000 CFU/mL Escherichia coli  Organism: Escherichia coli (13 May 2019 09:11)  Organism: Escherichia coli (13 May 2019 09:11)    Culture - Blood (collected 10 May 2019 18:12)  Source: .Blood Blood-Venous  Preliminary Report (12 May 2019 03:50):    No growth to date.    Culture - Blood (collected 10 May 2019 18:12)  Source: .Blood Blood-Venous  Preliminary Report (12 May 2019 03:50):    No growth to date.    RADIOLOGY:  Xray Chest 1 View-PORTABLE IMMEDIATE (05.13.19 @ 07:54)    Impression:    Bilateral opacifications, left greater than right.    ALLERGIES:  penicillin (Anaphylaxis; Angioedema)    HOME MEDICATIONS:  atorvastatin 40 mg oral tablet: 1 tab(s) orally once a day (at bedtime) (11 May 2019 01:15)  ISOSORBIDE MONONITRATE 10 MG TABS: 2 times a day (11 May 2019 01:15)  Januvia 100 mg oral tablet: 1 tab(s) orally once a day (11 May 2019 01:15)  Levemir FlexPen 100 units/mL subcutaneous solution: 30 unit(s) subcutaneous 2 times a day (11 May 2019 01:15)  losartan 50 mg oral tablet: 1 tab(s) orally once a day (11 May 2019 01:15)  METOPROLOL TARTRATE 50 MG TABS:  (11 May 2019 01:15)  omeprazole 20 mg oral delayed release capsule: 1 cap(s) orally once a day (11 May 2019 01:15)  Plavix 75 mg oral tablet: 1 tab(s) orally once a day (11 May 2019 01:15)  predniSONE 5 mg oral tablet: 1 tab(s) orally 3 times a day (11 May 2019 01:15)  raNITIdine 150 mg oral tablet: 1 tab(s) orally 2 times a day (11 May 2019 01:15)    ===========================================================
DAILY PROGRESS NOTE  ===========================================================    Patient Information:  MALLIKA WILLSON  /  68y  /  Female  /  MRN#: [id number]    Hospital Day: 3d    |:::::::::::::::::::::::::::| SUBJECTIVE |:::::::::::::::::::::::::::|    OVERNIGHT EVENTS: None  TODAY: Patient was seen today at bedside. She has significant improvement in her respiratory status compared to yesterday morning. She has no new complaints other than some mild abdominal pain. She reports it has been approximately three days since her last bowel movement. Review of systems is otherwise negative.    |:::::::::::::::::::::::::::| OBJECTIVE |:::::::::::::::::::::::::::|    VITAL SIGNS: Last 24 Hours  T(F): 98 (14 May 2019 05:40), Max: 98.9 (13 May 2019 21:36)  HR: 75 (14 May 2019 05:40) (75 - 87)  BP: 127/63 (14 May 2019 05:40) (127/63 - 165/78)  RR: 18 (14 May 2019 05:40) (18 - 18)    05-13-19 @ 07:01  -  05-14-19 @ 07:00  --------------------------------------------------------  IN: 580 mL / OUT: 900 mL / NET: -320 mL    PHYSICAL EXAM:  GENERAL:   Awake, alert; NAD.  HEENT:  Head NC/AT; Conjunctivae pink, Sclera anicteric.  CARDIO:   Regular rate; Regular rhythm; S1 & S2.  RESP:   Normal RR; Decreased sounds at bases.  GI:   Soft; NT/ND; BS; Some ?ascites.   EXT:   No edema in UE and LE.  NEURO:   PERRL.  SKIN:   Intact.    LAB RESULTS:               8.1<L>  10.38 >----------< 211             26.0<L>    MCV: 58.3<L>  MCHC: 31.2<L>  RDW:  16.9<H>    137  |  101  |  21<H>             --------------------------< 212<H>     4.9  |  18  | 1.5    eGFR AA: 41<L>  eGFR N-AA: 35<L>    Calcium: 8.4<L>  Phosphorus: 2.4  Magnesium: 2.8<H>            MICROBIOLOGY:  Culture - Blood (collected 12 May 2019 11:04)  Source: .Blood None  Preliminary Report (13 May 2019 19:02):    No growth to date.    RADIOLOGY:  Xray Chest 1 View-PORTABLE IMMEDIATE (05.13.19 @ 07:54)    Impression:    Bilateral opacifications, left greater than right.    ALLERGIES:  penicillin (Anaphylaxis; Angioedema)    HOME MEDICATIONS:  atorvastatin 40 mg oral tablet: 1 tab(s) orally once a day (at bedtime) (11 May 2019 01:15)  ISOSORBIDE MONONITRATE 10 MG TABS: 2 times a day (11 May 2019 01:15)  Januvia 100 mg oral tablet: 1 tab(s) orally once a day (11 May 2019 01:15)  Levemir FlexPen 100 units/mL subcutaneous solution: 30 unit(s) subcutaneous 2 times a day (11 May 2019 01:15)  losartan 50 mg oral tablet: 1 tab(s) orally once a day (11 May 2019 01:15)  METOPROLOL TARTRATE 50 MG TABS:  (11 May 2019 01:15)  omeprazole 20 mg oral delayed release capsule: 1 cap(s) orally once a day (11 May 2019 01:15)  Plavix 75 mg oral tablet: 1 tab(s) orally once a day (11 May 2019 01:15)  predniSONE 5 mg oral tablet: 1 tab(s) orally 3 times a day (11 May 2019 01:15)  raNITIdine 150 mg oral tablet: 1 tab(s) orally 2 times a day (11 May 2019 01:15)    ===========================================================
DAILY PROGRESS NOTE  ===========================================================    Patient Information:  MALLIKA WILLSON  /  68y  /  Female  /  MRN#: [id number]    Hospital Day: 4d    |:::::::::::::::::::::::::::| SUBJECTIVE |:::::::::::::::::::::::::::|    OVERNIGHT EVENTS: None  TODAY: Patient was seen today at bedside. She has abdominal distension with slight discomfort. But respiratory status continuing to significantly improve. Review of systems is otherwise negative.    |:::::::::::::::::::::::::::| OBJECTIVE |:::::::::::::::::::::::::::|    VITAL SIGNS: Last 24 Hours  T(F): 96.8 (15 May 2019 12:46), Max: 98 (15 May 2019 05:33)  HR: 76 (15 May 2019 12:46) (70 - 80)  BP: 173/75 (15 May 2019 12:46) (131/66 - 177/79)  RR: 18 (15 May 2019 12:46) (16 - 18)  SpO2: 97% (14 May 2019 20:09) (97% - 97%)    PHYSICAL EXAM:  GENERAL:   Awake, alert; NAD.  HEENT:  Head NC/AT; Conjunctivae pink, Sclera anicteric.  CARDIO:   Regular rate; Regular rhythm; S1 & S2.  RESP:   Normal RR; Decreased sounds at bases but better.  GI:   Soft; NT/ND; BS; Some ?ascites, unchanged really.   EXT:   No edema in UE and LE.  NEURO:   PERRL.  SKIN:   Intact.    LAB RESULTS:           8.5<L>  8.97 >----------< 250           27.7<L>    MCV: 58.9<L>  MCHC: 30.7<L>  RDW:  17.0<H>    05-15-19 @ 05:47    135  |  103  |  21<H>             --------------------------< 250<H>     5.6<H>  |  19  | 1.6<H>    eGFR AA: 38<L>  eGFR N-AA: 33<L>    Calcium: 8.8  Phosphorus: --  Magnesium: 2.5<H>              MICROBIOLOGY:  Culture - Blood (collected 12 May 2019 11:04)  Source: .Blood None  Preliminary Report (13 May 2019 19:02):    No growth to date.    RADIOLOGY:  Xray Chest 1 View-PORTABLE IMMEDIATE (05.13.19 @ 07:54)    Impression:    Bilateral opacifications, left greater than right.    ALLERGIES:  penicillin (Anaphylaxis; Angioedema)    HOME MEDICATIONS:  atorvastatin 40 mg oral tablet: 1 tab(s) orally once a day (at bedtime) (11 May 2019 01:15)  ISOSORBIDE MONONITRATE 10 MG TABS: 2 times a day (11 May 2019 01:15)  Januvia 100 mg oral tablet: 1 tab(s) orally once a day (11 May 2019 01:15)  Levemir FlexPen 100 units/mL subcutaneous solution: 30 unit(s) subcutaneous 2 times a day (11 May 2019 01:15)  losartan 50 mg oral tablet: 1 tab(s) orally once a day (11 May 2019 01:15)  METOPROLOL TARTRATE 50 MG TABS:  (11 May 2019 01:15)  omeprazole 20 mg oral delayed release capsule: 1 cap(s) orally once a day (11 May 2019 01:15)  Plavix 75 mg oral tablet: 1 tab(s) orally once a day (11 May 2019 01:15)  predniSONE 5 mg oral tablet: 1 tab(s) orally 3 times a day (11 May 2019 01:15)  raNITIdine 150 mg oral tablet: 1 tab(s) orally 2 times a day (11 May 2019 01:15)    ===========================================================
DAILY PROGRESS NOTE  ===========================================================    Patient Information:  MALLIKA WILLSON  /  68y  /  Female  /  MRN#: [id number]    Hospital Day: 5d    |:::::::::::::::::::::::::::| SUBJECTIVE |:::::::::::::::::::::::::::|    OVERNIGHT EVENTS: None  TODAY: Patient was seen today at bedside. Her breathing was significantly improved Review of systems is otherwise negative.    |:::::::::::::::::::::::::::| OBJECTIVE |:::::::::::::::::::::::::::|    VITAL SIGNS: Last 24 Hours  T(F): 98 (16 May 2019 05:55), Max: 98.4 (15 May 2019 20:08)  HR: 82 (16 May 2019 05:55) (70 - 83)  BP: 139/76 (16 May 2019 05:55) (121/68 - 173/75)  RR: 18 (16 May 2019 05:55) (18 - 18)  SpO2: 98% (15 May 2019 20:21) (98% - 98%)    PHYSICAL EXAM:  GENERAL:   Awake, alert; NAD.  HEENT:  Head NC/AT; Conjunctivae pink, Sclera anicteric.  CARDIO:   Regular rate; Regular rhythm; S1 & S2.  RESP:   Normal RR; Decreased sounds at bases but much better.  GI:   Soft; NT/ND; BS; Large pannus, Ascitic?  EXT:   No edema in UE and LE.  NEURO:   PERRL.  SKIN:   Intact.    LAB RESULTS:           8.5<L>  8.97 >----------< 250           27.7<L>    MCV: 58.9<L>  MCHC: 30.7<L>  RDW:  17.0<H>    05-15-19 @ 05:47    135  |  103  |  21<H>             --------------------------< 250<H>     5.6<H>  |  19  | 1.6<H>    eGFR AA: 38<L>  eGFR N-AA: 33<L>    Calcium: 8.8  Phosphorus: --  Magnesium: 2.5<H>              MICROBIOLOGY:  Culture - Blood (collected 12 May 2019 11:04)  Source: .Blood None  Preliminary Report (13 May 2019 19:02):    No growth to date.    RADIOLOGY:  No new images    ALLERGIES:  penicillin (Anaphylaxis; Angioedema)    HOME MEDICATIONS:  atorvastatin 40 mg oral tablet: 1 tab(s) orally once a day (at bedtime) (11 May 2019 01:15)  ISOSORBIDE MONONITRATE 10 MG TABS: 2 times a day (11 May 2019 01:15)  Januvia 100 mg oral tablet: 1 tab(s) orally once a day (11 May 2019 01:15)  Levemir FlexPen 100 units/mL subcutaneous solution: 30 unit(s) subcutaneous 2 times a day (11 May 2019 01:15)  losartan 50 mg oral tablet: 1 tab(s) orally once a day (11 May 2019 01:15)  METOPROLOL TARTRATE 50 MG TABS:  (11 May 2019 01:15)  omeprazole 20 mg oral delayed release capsule: 1 cap(s) orally once a day (11 May 2019 01:15)  Plavix 75 mg oral tablet: 1 tab(s) orally once a day (11 May 2019 01:15)  predniSONE 5 mg oral tablet: 1 tab(s) orally 3 times a day (11 May 2019 01:15)  raNITIdine 150 mg oral tablet: 1 tab(s) orally 2 times a day (11 May 2019 01:15)    ===========================================================
MALLIKA WILLSON  68y, Female      OVERNIGHT EVENTS:    no fevers, no cough/ SOB/ chest pain  comfortable    VITALS:  T(F): 98, Max: 98 (05-15-19 @ 05:33)  HR: 70  BP: 177/79  RR: 18Vital Signs Last 24 Hrs  T(C): 36.7 (15 May 2019 05:33), Max: 36.7 (15 May 2019 05:33)  T(F): 98 (15 May 2019 05:33), Max: 98 (15 May 2019 05:33)  HR: 70 (15 May 2019 05:33) (70 - 80)  BP: 177/79 (15 May 2019 05:33) (131/66 - 177/79)  BP(mean): --  RR: 18 (15 May 2019 05:33) (16 - 18)  SpO2: 97% (14 May 2019 20:09) (97% - 97%)    TESTS & MEASUREMENTS:                        8.1    10.38 )-----------( 211      ( 14 May 2019 05:54 )             26.0     05-14    137  |  101  |  21<H>  ----------------------------<  212<H>  4.9   |  18  |  1.5    Ca    8.4<L>      14 May 2019 05:54  Phos  2.4     05-14  Mg     2.8     05-14          Culture - Blood (collected 05-12-19 @ 11:04)  Source: .Blood None  Preliminary Report (05-13-19 @ 19:02):    No growth to date.    Culture - Blood (collected 05-11-19 @ 07:11)  Source: .Blood None  Preliminary Report (05-12-19 @ 17:00):    No growth to date.    Culture - Blood (collected 05-11-19 @ 07:11)  Source: .Blood None  Preliminary Report (05-12-19 @ 17:00):    No growth to date.    Culture - Urine (collected 05-10-19 @ 20:05)  Source: .Urine Clean Catch (Midstream)  Final Report (05-13-19 @ 09:11):    10,000 - 49,000 CFU/mL Escherichia coli  Organism: Escherichia coli (05-13-19 @ 09:11)  Organism: Escherichia coli (05-13-19 @ 09:11)      -  Amikacin: S <=16      -  Ampicillin: S <=8 These ampicillin results predict results for amoxicillin      -  Ampicillin/Sulbactam: S <=8/4      -  Aztreonam: S <=4      -  Cefazolin: S <=8 For uncomplicated UTI with K. pneumoniae, E. coli, or P. mirablis: EMIL <=16 is sensitive and EMIL >=32 is resistant. This also predicts results for oral agents cefaclor, cefdinir, cefpodoxime, cefprozil, cefuroxime axetil, cephalexin and locarbef for uncomplicated UTI. Note that some isolates may be susceptible to these agents while testing resistant to cefazolin.      -  Cefepime: S <=4      -  Cefoxitin: S <=8      -  Ceftriaxone: S <=1 Enterobacter, Citrobacter, and Serratia may develop resistance during prolonged therapy      -  Ciprofloxacin: S <=1      -  Ertapenem: S <=1      -  Gentamicin: S <=4      -  Imipenem: S <=1      -  Levofloxacin: S <=2      -  Meropenem: S <=1      -  Nitrofurantoin: S <=32 Should not be used to treat pyelonephritis      -  Piperacillin/Tazobactam: S <=16      -  Tigecycline: S <=2      -  Tobramycin: S <=4      -  Trimethoprim/Sulfamethoxazole: S <=2/38      Method Type: EMIL    Culture - Blood (collected 05-10-19 @ 18:12)  Source: .Blood Blood-Venous  Preliminary Report (05-12-19 @ 03:50):    No growth to date.    Culture - Blood (collected 05-10-19 @ 18:12)  Source: .Blood Blood-Venous  Preliminary Report (05-12-19 @ 03:50):    No growth to date.            RADIOLOGY & ADDITIONAL TESTS:    ANTIBIOTICS:  cefepime   IVPB      cefepime   IVPB 2000 milliGRAM(s) IV Intermittent every 12 hours  levoFLOXacin IVPB 500 milliGRAM(s) IV Intermittent every 24 hours  levoFLOXacin IVPB
MALLIKA WILLSON  68y, Female      OVERNIGHT EVENTS:    no fevers, still with significant SOB, minimal cough, mild left pleurisy, no phlegm, no night sweats    VITALS:  T(F): 98, Max: 98.9 (05-13-19 @ 21:36)  HR: 75  BP: 127/63  RR: 18Vital Signs Last 24 Hrs  T(C): 36.7 (14 May 2019 05:40), Max: 37.2 (13 May 2019 21:36)  T(F): 98 (14 May 2019 05:40), Max: 98.9 (13 May 2019 21:36)  HR: 75 (14 May 2019 05:40) (75 - 87)  BP: 127/63 (14 May 2019 05:40) (127/63 - 165/78)  BP(mean): --  RR: 18 (14 May 2019 05:40) (18 - 24)  SpO2: 98% (13 May 2019 06:14) (98% - 98%)    TESTS & MEASUREMENTS:                        7.6    9.89  )-----------( 199      ( 13 May 2019 17:50 )             24.8     05-13    136  |  100  |  23<H>  ----------------------------<  343<H>  4.5   |  18  |  1.7<H>    Ca    8.1<L>      13 May 2019 17:50  Phos  2.2     05-13  Mg     1.6     05-13          Culture - Blood (collected 05-12-19 @ 11:04)  Source: .Blood None  Preliminary Report (05-13-19 @ 19:02):    No growth to date.    Culture - Blood (collected 05-11-19 @ 07:11)  Source: .Blood None  Preliminary Report (05-12-19 @ 17:00):    No growth to date.    Culture - Blood (collected 05-11-19 @ 07:11)  Source: .Blood None  Preliminary Report (05-12-19 @ 17:00):    No growth to date.    Culture - Urine (collected 05-10-19 @ 20:05)  Source: .Urine Clean Catch (Midstream)  Final Report (05-13-19 @ 09:11):    10,000 - 49,000 CFU/mL Escherichia coli  Organism: Escherichia coli (05-13-19 @ 09:11)  Organism: Escherichia coli (05-13-19 @ 09:11)      -  Amikacin: S <=16      -  Ampicillin: S <=8 These ampicillin results predict results for amoxicillin      -  Ampicillin/Sulbactam: S <=8/4      -  Aztreonam: S <=4      -  Cefazolin: S <=8 For uncomplicated UTI with K. pneumoniae, E. coli, or P. mirablis: EMIL <=16 is sensitive and EMIL >=32 is resistant. This also predicts results for oral agents cefaclor, cefdinir, cefpodoxime, cefprozil, cefuroxime axetil, cephalexin and locarbef for uncomplicated UTI. Note that some isolates may be susceptible to these agents while testing resistant to cefazolin.      -  Cefepime: S <=4      -  Cefoxitin: S <=8      -  Ceftriaxone: S <=1 Enterobacter, Citrobacter, and Serratia may develop resistance during prolonged therapy      -  Ciprofloxacin: S <=1      -  Ertapenem: S <=1      -  Gentamicin: S <=4      -  Imipenem: S <=1      -  Levofloxacin: S <=2      -  Meropenem: S <=1      -  Nitrofurantoin: S <=32 Should not be used to treat pyelonephritis      -  Piperacillin/Tazobactam: S <=16      -  Tigecycline: S <=2      -  Tobramycin: S <=4      -  Trimethoprim/Sulfamethoxazole: S <=2/38      Method Type: EMIL    Culture - Blood (collected 05-10-19 @ 18:12)  Source: .Blood Blood-Venous  Preliminary Report (05-12-19 @ 03:50):    No growth to date.    Culture - Blood (collected 05-10-19 @ 18:12)  Source: .Blood Blood-Venous  Preliminary Report (05-12-19 @ 03:50):    No growth to date.            RADIOLOGY & ADDITIONAL TESTS:    ANTIBIOTICS:  cefepime   IVPB      cefepime   IVPB 2000 milliGRAM(s) IV Intermittent every 12 hours  levoFLOXacin IVPB 500 milliGRAM(s) IV Intermittent every 24 hours  levoFLOXacin IVPB
MALLIKA WILLSON  68y, Female  Allergy: penicillin (Anaphylaxis; Angioedema)      CHIEF COMPLAINT: Sepsis (13 May 2019 13:47)      INTERVAL EVENTS/HPI  - No acute events overnight, this AM more hypoxemia s/p lasix x 2 and feels better  - T(F): , Max: 97.8 (05-13-19 @ 04:17)  - no WBC today  - Tolerating medication    ROS  Negative except as per noted above in HPI  Denies diarrhea  Denies dysuria   Denies pain at any IV site     FH noncontributory    VITALS:  T(F): 96.7, Max: 97.8 (05-13-19 @ 04:17)  HR: 87  BP: 131/70  RR: 18Vital Signs Last 24 Hrs  T(C): 35.9 (13 May 2019 13:20), Max: 36.6 (13 May 2019 04:17)  T(F): 96.7 (13 May 2019 13:20), Max: 97.8 (13 May 2019 04:17)  HR: 87 (13 May 2019 13:20) (71 - 87)  BP: 131/70 (13 May 2019 13:20) (131/70 - 183/86)  BP(mean): 123 (12 May 2019 20:30) (123 - 123)  RR: 18 (13 May 2019 13:20) (18 - 24)  SpO2: 98% (13 May 2019 06:14) (97% - 98%)    PHYSICAL EXAM:  Gen: Elderly F NAD, resting in bed  HEENT: Normocephalic, atraumatic  Neck: supple, no lymphadenopathy  CV: Regular rate & regular rhythm  Lungs: decreased BS at bases  Abdomen: Soft, BS present  Ext: Warm, well perfused  Neuro: non focal, awake  Skin: no rash, no erythema      TESTS & MEASUREMENTS:                        7.5    12.59 )-----------( 188      ( 12 May 2019 11:04 )             24.7     05-12    131<L>  |  98  |  22<H>  ----------------------------<  250<H>  4.5   |  20  |  1.6<H>    Ca    8.1<L>      12 May 2019 11:04  Mg     2.0     05-12      Culture - Blood (collected 05-11-19 @ 07:11)  Source: .Blood None  Preliminary Report (05-12-19 @ 17:00):    No growth to date.    Culture - Blood (collected 05-11-19 @ 07:11)  Source: .Blood None  Preliminary Report (05-12-19 @ 17:00):    No growth to date.    Culture - Urine (collected 05-10-19 @ 20:05)  Source: .Urine Clean Catch (Midstream)  Final Report (05-13-19 @ 09:11):    10,000 - 49,000 CFU/mL Escherichia coli  Organism: Escherichia coli (05-13-19 @ 09:11)  Organism: Escherichia coli (05-13-19 @ 09:11)      -  Amikacin: S <=16      -  Ampicillin: S <=8 These ampicillin results predict results for amoxicillin      -  Ampicillin/Sulbactam: S <=8/4      -  Aztreonam: S <=4      -  Cefazolin: S <=8 For uncomplicated UTI with K. pneumoniae, E. coli, or P. mirablis: EMIL <=16 is sensitive and EMIL >=32 is resistant. This also predicts results for oral agents cefaclor, cefdinir, cefpodoxime, cefprozil, cefuroxime axetil, cephalexin and locarbef for uncomplicated UTI. Note that some isolates may be susceptible to these agents while testing resistant to cefazolin.      -  Cefepime: S <=4      -  Cefoxitin: S <=8      -  Ceftriaxone: S <=1 Enterobacter, Citrobacter, and Serratia may develop resistance during prolonged therapy      -  Ciprofloxacin: S <=1      -  Ertapenem: S <=1      -  Gentamicin: S <=4      -  Imipenem: S <=1      -  Levofloxacin: S <=2      -  Meropenem: S <=1      -  Nitrofurantoin: S <=32 Should not be used to treat pyelonephritis      -  Piperacillin/Tazobactam: S <=16      -  Tigecycline: S <=2      -  Tobramycin: S <=4      -  Trimethoprim/Sulfamethoxazole: S <=2/38      Method Type: EMIL    Culture - Blood (collected 05-10-19 @ 18:12)  Source: .Blood Blood-Venous  Preliminary Report (05-12-19 @ 03:50):    No growth to date.    Culture - Blood (collected 05-10-19 @ 18:12)  Source: .Blood Blood-Venous  Preliminary Report (05-12-19 @ 03:50):    No growth to date.        Blood Gas Venous - Lactate: 2.4 mmoL/L (05-11-19 @ 00:02)  Blood Gas Venous - Lactate: 2.2 mmoL/L (05-10-19 @ 18:53)  Lactate, Blood: 2.3 mmol/L (05-10-19 @ 18:12)      INFECTIOUS DISEASES TESTING  MRSA PCR Result.: Negative (05-13-19 @ 08:25)      RADIOLOGY & ADDITIONAL TESTS:  I have personally reviewed the last Chest xray  CXR  Xray Chest 1 View- PORTABLE-Urgent:   EXAM:  XR CHEST PORTABLE URGENT 1V            PROCEDURE DATE:  05/12/2019            INTERPRETATION:  Clinical History / Reason for exam: Dyspnea    Comparison : Chest radiograph 5/10/2019.    Technique/Positioning: Single image.    Findings:    Support devices: None.    Cardiac/mediastinum/hilum: Indeterminate    Lung parenchyma/Pleura: Left upper lobe opacity unchanged. Right lower   lobe opacity/pleural effusion. No air leak.    Skeleton/soft tissues: Unremarkable.    Impression:      Left upper lobe opacity unchanged. Right lower lobe opacity/pleural   effusion. No air leak.        CT  CT Angio Chest w/ IV Cont:   EXAM:  CT ABDOMEN AND PELVIS IC        EXAM:  CT ANGIO CHEST (W)AW IC            PROCEDURE DATE:  05/10/2019            INTERPRETATION:  CTA chest with IV contrast, CT abdomen and pelvis with   IV contrast     CLINICAL HISTORY / REASON FOR EXAM: Shortness of breath  . Abdominal pain.    TECHNIQUE: CTA chest with IV contrast, CT abdomen and pelvis with IV   contrast  performed. Contiguous initial CTA images of chest obtained   following administration of 100 mL Optiray 320 intravenous contrast.   Subsequently, CT images of abdomen and pelvis obtained with IV contrast   administration. Coronal, sagittal and MIP reformatted images are also   submitted.    COMPARISON CT: CT abdomen pelvis from September 27, 2018    OTHER STUDIES USED FOR CORRELATION: None.       FINDINGS:    CHEST:  IMPRESSION:     1. Left upper lobe and lingular segments consolidation, consistent with   pneumonia. Follow-up imaging to resolution recommended.    2. Cardiomegaly. Reflux of contrast into IVC ; findings may be seen and   right heart dysfunction.    3. No evidence of acute pulmonary embolism or acute intra-abdominal   patholog         (05-10-19 @ 22:35)      CARDIOLOGY TESTING  Transthoracic Echocardiogram:    EXAM:  2-D ECHO (TTE) COMPLETE        PROCEDURE DATE:  05/11/2019      INTERPRETATION:  REPORT:    TRANSTHORACIC ECHOCARDIOGRAM REPORT         Patient Name:   MALLIKA WILLSON Accession #: [id number]  Medical Rec #:  [id number]    Height:      60.0 in 152.4 cm  YOB: 1950    Weight:      120.0 lb 54.43 kg  Patient Age:    68 years     BSA:         1.50 m²  Patient Gender: F            BP:          137/63 mmHg       Date of Exam:        5/11/2019 4:03:49 PM  Referring Physician: [id number] YATES  Sonographer:         Shalonda Naranjo  Reading Physician:   Bill Dai M.D.    Procedure:     2D Echo/Doppler/Color Doppler Complete.  Indications:   I42.9 - Cardiomyopathy, unspecified  Diagnosis:     I42.9 - Cardiomyopathy, unspecified  Study Details: Technically good study.         Summary:   1. Normal global left ventricular systolic function.   2. LV Ejection Fraction by Maza's Method with a biplane EF of 62 %.   3. LA is mildly dilated.   4. Mild mitral valve regurgitation.   5. Mild thickening of the anterior mitral valve leaflet.   6. Mild aortic regurgitation.    PHYSICIAN INTERPRETATION:  Left Ventricle: The left ventricular internal cavity size is normal. Left   ventricular wall thickness is normal. Global LV systolic function was   normal.  Right Ventricle: Normal right ventricular size and function.  Left Atrium: LA is mildly dilated.  Right Atrium: Normal right atrial size.  Pericardium: There is no evidence of pericardial effusion.  Mitral Valve: Mild thickening of the anterior mitral valve leaflet. Mild   mitral valve regurgitation is seen.  Tricuspid Valve: Trivial tricuspid regurgitation is visualized.  Aortic Valve: The aortic valve is trileaflet. No evidence of aortic   stenosis. Aortic valve thickening with normal leaflet opening. Mild   aortic valve regurgitation is seen.  Aorta: The aortic root is normal in size and structure.  Pulmonary Artery: The main pulmonary artery is normal in size.  Venous: The inferior vena cava is not well visualized.       2D AND M-MODE MEASUREMENTS (normal ranges within parentheses):  Left                  Normal   Aorta/Left             Normal  Ventricle:                     Atrium:  IVSd (2D):  1.09 cm  (0.7-1.1) AoV Cusp       2.02  (1.5-2.6)  LVPWd (2D): 1.08 cm  (0.7-1.1) Separation:     cm  LVIDd (2D): 3.80 cm  (3.4-5.7) Left Atrium    4.62  (1.9-4.0)  LVIDs (2D): 2.75 cm            (Mmode):        cm  LV FS (2D):  27.5 %   (>25%)   LA Volume      38.0  Relative      0.57    (<0.42)  Index         ml/m²  Wall                           Right  Thickness                      Ventricle:                                 RVd (2D):      2.42 cm    SPECTRAL DOPPLER ANALYSIS:  LVOT Vmax: 1.02 m/s  LVOT VTI:  0.22 m  LVOT Diam: 1.86 cm       [id number] Bill Dai M.D., Electronically signed on 5/12/2019 at   10:51:09 AM         *** Final ***                    BILL DAI M.D., ATTENDING CARDIOLOGIST  This document has been electronically signed. May 11 2019  4:03PM             (05-11-19 @ 16:03)  12 Lead ECG:   Ventricular Rate 88 BPM    Atrial Rate 88 BPM    P-R Interval 146 ms    QRS Duration 64 ms    Q-T Interval 324 ms    QTC Calculation(Bezet) 392 ms    P Axis 21 degrees    R Axis 9 degrees    T Axis 19 degrees    Diagnosis Line Normal sinus rhythm  Minimal voltage criteria for LVH, may be normal variant  Inferior infarct , age undetermined  Anterolateral infarct , age undetermined  Abnormal ECG    Confirmed by MALAIKA SANTANA, BILL ([id number]) on 5/10/2019 9:35:01 PM (05-10-19 @ 19:20)      MEDICATIONS  atorvastatin 40  cefepime   IVPB   cefepime   IVPB 2000  clopidogrel Tablet 75  dextrose 5%. 1000  dextrose 50% Injectable 12.5  dextrose 50% Injectable 25  dextrose 50% Injectable 25  docusate sodium 100  gabapentin 400  heparin  Injectable 5000  insulin glargine Injectable (LANTUS) 18  insulin lispro (HumaLOG) corrective regimen sliding scale   insulin lispro Injectable (HumaLOG) 5  isosorbide   mononitrate ER Tablet (IMDUR) 30  levoFLOXacin IVPB 500  levoFLOXacin IVPB   metoprolol tartrate 25  pantoprazole    Tablet 40  polyethylene glycol 3350 17  predniSONE   Tablet 5  senna 2      ANTIBIOTICS:  cefepime   IVPB      cefepime   IVPB 2000 milliGRAM(s) IV Intermittent every 12 hours  levoFLOXacin IVPB 500 milliGRAM(s) IV Intermittent every 24 hours  levoFLOXacin IVPB
no chest pain   no sob     Vital Signs Last 24 Hrs  T(C): 36.5 (17 May 2019 05:44), Max: 37.1 (16 May 2019 12:47)  T(F): 97.7 (17 May 2019 05:44), Max: 98.7 (16 May 2019 12:47)  HR: 71 (17 May 2019 05:44) (71 - 81)  BP: 141/69 (17 May 2019 05:44) (141/69 - 178/93)  BP(mean): --  RR: 18 (17 May 2019 05:44) (18 - 18)  SpO2: --    PHYSICAL EXAM:  GENERAL: NAD, well-developed  HEAD:  Atraumatic, Normocephalic  EYES: EOMI, PERRLA, conjunctiva and sclera clear  NECK: Supple, No JVD  Pulm: Clear to auscultation bilaterally; No wheeze  CV: Regular rate and rhythm; No murmurs, rubs, or gallops  GI: Soft, Nontender, Nondistended; Bowel sounds present  EXTREMITIES:  2+ Peripheral Pulses, No clubbing, cyanosis, or edema  PSYCH: AAOx3  NEUROLOGY: non-focal  SKIN: No rashes or lesions      05-16    138  |  105  |  23<H>  ----------------------------<  311<H>  4.9   |  18  |  1.4    Ca    9.6      16 May 2019 05:37  Mg     2.6     05-16        MEDICATIONS  (STANDING):  atorvastatin 40 milliGRAM(s) Oral at bedtime  clopidogrel Tablet 75 milliGRAM(s) Oral daily  dextrose 5%. 1000 milliLiter(s) (50 mL/Hr) IV Continuous <Continuous>  dextrose 50% Injectable 12.5 Gram(s) IV Push once  dextrose 50% Injectable 25 Gram(s) IV Push once  dextrose 50% Injectable 25 Gram(s) IV Push once  docusate sodium 100 milliGRAM(s) Oral three times a day  ferrous    sulfate 325 milliGRAM(s) Oral daily  gabapentin 400 milliGRAM(s) Oral three times a day  heparin  Injectable 5000 Unit(s) SubCutaneous every 8 hours  insulin lispro (HumaLOG) corrective regimen sliding scale   SubCutaneous three times a day before meals  insulin lispro Injectable (HumaLOG) 14 Unit(s) SubCutaneous three times a day before meals  isosorbide   mononitrate ER Tablet (IMDUR) 30 milliGRAM(s) Oral daily  levoFLOXacin  Tablet 500 milliGRAM(s) Oral every 24 hours  metoprolol tartrate 25 milliGRAM(s) Oral two times a day  pantoprazole    Tablet 40 milliGRAM(s) Oral before breakfast  predniSONE   Tablet 5 milliGRAM(s) Oral three times a day  senna 2 Tablet(s) Oral at bedtime    MEDICATIONS  (PRN):  acetaminophen   Tablet .. 650 milliGRAM(s) Oral every 6 hours PRN Temp greater or equal to 38C (100.4F)  aluminum hydroxide/magnesium hydroxide/simethicone Suspension 30 milliLiter(s) Oral every 6 hours PRN Dyspepsia  dextrose 40% Gel 15 Gram(s) Oral once PRN Blood Glucose LESS THAN 70 milliGRAM(s)/deciliter  glucagon  Injectable 1 milliGRAM(s) IntraMuscular once PRN Glucose LESS THAN 70 milligrams/deciliter  oxyCODONE    IR 5 milliGRAM(s) Oral three times a day PRN Moderate Pain (4 - 6)
patient was SOB this am but got better after getting IV lasix     Vital Signs Last 24 Hrs  T(C): 36.6 (13 May 2019 04:17), Max: 36.6 (12 May 2019 14:06)  T(F): 97.8 (13 May 2019 04:17), Max: 97.9 (12 May 2019 14:06)  HR: 82 (13 May 2019 06:14) (71 - 86)  BP: 159/81 (13 May 2019 04:17) (133/69 - 183/86)  BP(mean): 123 (12 May 2019 20:30) (123 - 123)  RR: 24 (13 May 2019 06:14) (17 - 24)  SpO2: 98% (13 May 2019 06:14) (96% - 98%)    PHYSICAL EXAM:  GENERAL: NAD  HEAD:  Atraumatic, Normocephalic  EYES: EOMI, PERRLA, conjunctiva and sclera clear  NECK: Supple, No JVD  Pulm: B/L Rales   CV: Regular rate and rhythm; No murmurs, rubs, or gallops  GI Soft, Nontender, Nondistended; Bowel sounds present  EXTREMITIES:  2+ Peripheral Pulses, No clubbing, cyanosis, or edema  PSYCH: AAOx3  NEUROLOGY: non-focal  SKIN: No rashes or lesions                          7.5    12.59 )-----------( 188      ( 12 May 2019 11:04 )             24.7     05-12    131<L>  |  98  |  22<H>  ----------------------------<  250<H>  4.5   |  20  |  1.6<H>    Ca    8.1<L>      12 May 2019 11:04  Mg     2.0     05-12                Culture - Blood (collected 11 May 2019 07:11)  Source: .Blood None  Preliminary Report (12 May 2019 17:00):    No growth to date.    Culture - Blood (collected 11 May 2019 07:11)  Source: .Blood None  Preliminary Report (12 May 2019 17:00):    No growth to date.    Culture - Urine (collected 10 May 2019 20:05)  Source: .Urine Clean Catch (Midstream)  Final Report (13 May 2019 09:11):    10,000 - 49,000 CFU/mL Escherichia coli  Organism: Escherichia coli (13 May 2019 09:11)  Organism: Escherichia coli (13 May 2019 09:11)    Culture - Blood (collected 10 May 2019 18:12)  Source: .Blood Blood-Venous  Preliminary Report (12 May 2019 03:50):    No growth to date.    Culture - Blood (collected 10 May 2019 18:12)  Source: .Blood Blood-Venous  Preliminary Report (12 May 2019 03:50):    No growth to date.    MEDICATIONS  (STANDING):  atorvastatin 40 milliGRAM(s) Oral at bedtime  cefepime   IVPB      cefepime   IVPB 2000 milliGRAM(s) IV Intermittent every 12 hours  clopidogrel Tablet 75 milliGRAM(s) Oral daily  docusate sodium 100 milliGRAM(s) Oral three times a day  gabapentin 400 milliGRAM(s) Oral three times a day  heparin  Injectable 5000 Unit(s) SubCutaneous every 8 hours  insulin glargine Injectable (LANTUS) 30 Unit(s) SubCutaneous two times a day  isosorbide   mononitrate ER Tablet (IMDUR) 30 milliGRAM(s) Oral daily  levoFLOXacin IVPB 500 milliGRAM(s) IV Intermittent every 24 hours  levoFLOXacin IVPB      metoprolol tartrate 25 milliGRAM(s) Oral two times a day  pantoprazole    Tablet 40 milliGRAM(s) Oral before breakfast  polyethylene glycol 3350 17 Gram(s) Oral daily  predniSONE   Tablet 5 milliGRAM(s) Oral three times a day  senna 2 Tablet(s) Oral at bedtime    MEDICATIONS  (PRN):  acetaminophen   Tablet .. 650 milliGRAM(s) Oral every 6 hours PRN Temp greater or equal to 38C (100.4F)  aluminum hydroxide/magnesium hydroxide/simethicone Suspension 30 milliLiter(s) Oral every 6 hours PRN Dyspepsia  oxyCODONE    IR 5 milliGRAM(s) Oral three times a day PRN Moderate Pain (4 - 6)
still SOB but better compared to yesterday   no fever, has constipation     Vital Signs Last 24 Hrs  T(C): 36.7 (14 May 2019 05:40), Max: 37.2 (13 May 2019 21:36)  T(F): 98 (14 May 2019 05:40), Max: 98.9 (13 May 2019 21:36)  HR: 75 (14 May 2019 05:40) (75 - 87)  BP: 127/63 (14 May 2019 05:40) (127/63 - 165/78)  BP(mean): --  RR: 18 (14 May 2019 05:40) (18 - 18)  SpO2: --    PHYSICAL EXAM:  GENERAL: NAD, well-developed  HEAD:  Atraumatic, Normocephalic  EYES: EOMI, PERRLA, conjunctiva and sclera clear  NECK: Supple, No JVD  Pulm: Rales B/L left more than right   CV: Regular rate and rhythm; No murmurs, rubs, or gallops  GI: Soft, Nontender, Nondistended; Bowel sounds present  EXTREMITIES:  2+ Peripheral Pulses, No clubbing, cyanosis, or edema  PSYCH: AAOx3  NEUROLOGY: non-focal  SKIN: No rashes or lesions                          8.1    10.38 )-----------( 211      ( 14 May 2019 05:54 )             26.0     05-14    137  |  101  |  21<H>  ----------------------------<  212<H>  4.9   |  18  |  1.5    Ca    8.4<L>      14 May 2019 05:54  Phos  2.4     05-14  Mg     2.8     05-14                Culture - Blood (collected 12 May 2019 11:04)  Source: .Blood None  Preliminary Report (13 May 2019 19:02):    No growth to date.
no chest pain   SOB much better   still has constipation and abdominal bloating     Vital Signs Last 24 Hrs  T(C): 36.7 (15 May 2019 05:33), Max: 36.7 (15 May 2019 05:33)  T(F): 98 (15 May 2019 05:33), Max: 98 (15 May 2019 05:33)  HR: 70 (15 May 2019 05:33) (70 - 80)  BP: 177/79 (15 May 2019 05:33) (131/66 - 177/79)  BP(mean): --  RR: 18 (15 May 2019 05:33) (16 - 18)  SpO2: 97% (14 May 2019 20:09) (97% - 97%)                          8.5    8.97  )-----------( 250      ( 15 May 2019 05:47 )             27.7     05-15    135  |  103  |  21<H>  ----------------------------<  250<H>  5.6<H>   |  19  |  1.6<H>    Ca    8.8      15 May 2019 05:47  Phos  2.4     05-14  Mg     2.5     05-15                Culture - Blood (collected 12 May 2019 11:04)  Source: .Blood None  Preliminary Report (13 May 2019 19:02):    No growth to date.        PHYSICAL EXAM:  GENERAL: NAD, well-developed  HEAD:  Atraumatic, Normocephalic  EYES: EOMI, PERRLA, conjunctiva and sclera clear  NECK: Supple, No JVD  Pulm: Clear to auscultation bilaterally; No wheeze  CV: Regular rate and rhythm; No murmurs, rubs, or gallops  GI: Soft, Nontender, Bowel sounds present  EXTREMITIES:  2+ Peripheral Pulses, No clubbing, cyanosis, or edema  PSYCH: AAOx3  NEUROLOGY: non-focal  SKIN: No rashes or lesions    MEDICATIONS  (STANDING):  atorvastatin 40 milliGRAM(s) Oral at bedtime  cefepime   IVPB      cefepime   IVPB 2000 milliGRAM(s) IV Intermittent every 12 hours  clopidogrel Tablet 75 milliGRAM(s) Oral daily  dextrose 5%. 1000 milliLiter(s) (50 mL/Hr) IV Continuous <Continuous>  dextrose 50% Injectable 12.5 Gram(s) IV Push once  dextrose 50% Injectable 25 Gram(s) IV Push once  dextrose 50% Injectable 25 Gram(s) IV Push once  docusate sodium 100 milliGRAM(s) Oral three times a day  ferrous    sulfate 325 milliGRAM(s) Oral daily  gabapentin 400 milliGRAM(s) Oral three times a day  heparin  Injectable 5000 Unit(s) SubCutaneous every 8 hours  insulin glargine Injectable (LANTUS) 20 Unit(s) SubCutaneous every morning  insulin lispro (HumaLOG) corrective regimen sliding scale   SubCutaneous three times a day before meals  insulin lispro Injectable (HumaLOG) 7 Unit(s) SubCutaneous three times a day before meals  isosorbide   mononitrate ER Tablet (IMDUR) 30 milliGRAM(s) Oral daily  levoFLOXacin IVPB 500 milliGRAM(s) IV Intermittent every 24 hours  levoFLOXacin IVPB      magnesium citrate Oral Solution 1 Bottle Oral once  metoprolol tartrate 25 milliGRAM(s) Oral two times a day  ondansetron    Tablet 4 milliGRAM(s) Oral once  pantoprazole    Tablet 40 milliGRAM(s) Oral before breakfast  predniSONE   Tablet 5 milliGRAM(s) Oral three times a day  senna 2 Tablet(s) Oral at bedtime    MEDICATIONS  (PRN):  acetaminophen   Tablet .. 650 milliGRAM(s) Oral every 6 hours PRN Temp greater or equal to 38C (100.4F)  aluminum hydroxide/magnesium hydroxide/simethicone Suspension 30 milliLiter(s) Oral every 6 hours PRN Dyspepsia  dextrose 40% Gel 15 Gram(s) Oral once PRN Blood Glucose LESS THAN 70 milliGRAM(s)/deciliter  glucagon  Injectable 1 milliGRAM(s) IntraMuscular once PRN Glucose LESS THAN 70 milligrams/deciliter  oxyCODONE    IR 5 milliGRAM(s) Oral three times a day PRN Moderate Pain (4 - 6)

## 2019-05-20 LAB
GAMMA INTERFERON BACKGROUND BLD IA-ACNC: 0.13 IU/ML — SIGNIFICANT CHANGE UP
GLUCOSE BLDC GLUCOMTR-MCNC: 413 MG/DL — HIGH (ref 70–99)
M TB IFN-G BLD-IMP: NEGATIVE — SIGNIFICANT CHANGE UP
M TB IFN-G CD4+ BCKGRND COR BLD-ACNC: 0.09 IU/ML — SIGNIFICANT CHANGE UP
M TB IFN-G CD4+CD8+ BCKGRND COR BLD-ACNC: 0.02 IU/ML — SIGNIFICANT CHANGE UP
QUANT TB PLUS MITOGEN MINUS NIL: 1.92 IU/ML — SIGNIFICANT CHANGE UP

## 2019-05-21 DIAGNOSIS — M06.9 RHEUMATOID ARTHRITIS, UNSPECIFIED: ICD-10-CM

## 2019-05-21 DIAGNOSIS — A41.9 SEPSIS, UNSPECIFIED ORGANISM: ICD-10-CM

## 2019-05-21 DIAGNOSIS — E87.1 HYPO-OSMOLALITY AND HYPONATREMIA: ICD-10-CM

## 2019-05-21 DIAGNOSIS — I21.A1 MYOCARDIAL INFARCTION TYPE 2: ICD-10-CM

## 2019-05-21 DIAGNOSIS — E87.5 HYPERKALEMIA: ICD-10-CM

## 2019-05-21 DIAGNOSIS — N18.3 CHRONIC KIDNEY DISEASE, STAGE 3 (MODERATE): ICD-10-CM

## 2019-05-21 DIAGNOSIS — D56.3 THALASSEMIA MINOR: ICD-10-CM

## 2019-05-21 DIAGNOSIS — E11.65 TYPE 2 DIABETES MELLITUS WITH HYPERGLYCEMIA: ICD-10-CM

## 2019-05-21 DIAGNOSIS — E87.6 HYPOKALEMIA: ICD-10-CM

## 2019-05-21 DIAGNOSIS — G89.29 OTHER CHRONIC PAIN: ICD-10-CM

## 2019-05-21 DIAGNOSIS — T40.2X5A ADVERSE EFFECT OF OTHER OPIOIDS, INITIAL ENCOUNTER: ICD-10-CM

## 2019-05-21 DIAGNOSIS — I50.31 ACUTE DIASTOLIC (CONGESTIVE) HEART FAILURE: ICD-10-CM

## 2019-05-21 DIAGNOSIS — J18.9 PNEUMONIA, UNSPECIFIED ORGANISM: ICD-10-CM

## 2019-05-21 DIAGNOSIS — I13.0 HYPERTENSIVE HEART AND CHRONIC KIDNEY DISEASE WITH HEART FAILURE AND STAGE 1 THROUGH STAGE 4 CHRONIC KIDNEY DISEASE, OR UNSPECIFIED CHRONIC KIDNEY DISEASE: ICD-10-CM

## 2019-05-21 DIAGNOSIS — I25.10 ATHEROSCLEROTIC HEART DISEASE OF NATIVE CORONARY ARTERY WITHOUT ANGINA PECTORIS: ICD-10-CM

## 2019-05-21 DIAGNOSIS — E46 UNSPECIFIED PROTEIN-CALORIE MALNUTRITION: ICD-10-CM

## 2019-05-21 DIAGNOSIS — K59.03 DRUG INDUCED CONSTIPATION: ICD-10-CM

## 2019-05-21 DIAGNOSIS — D50.9 IRON DEFICIENCY ANEMIA, UNSPECIFIED: ICD-10-CM

## 2019-05-21 DIAGNOSIS — N17.0 ACUTE KIDNEY FAILURE WITH TUBULAR NECROSIS: ICD-10-CM

## 2019-05-21 DIAGNOSIS — Z79.4 LONG TERM (CURRENT) USE OF INSULIN: ICD-10-CM

## 2019-05-21 DIAGNOSIS — E11.22 TYPE 2 DIABETES MELLITUS WITH DIABETIC CHRONIC KIDNEY DISEASE: ICD-10-CM

## 2019-05-21 DIAGNOSIS — G93.41 METABOLIC ENCEPHALOPATHY: ICD-10-CM

## 2019-05-21 DIAGNOSIS — K52.1 TOXIC GASTROENTERITIS AND COLITIS: ICD-10-CM

## 2019-05-21 DIAGNOSIS — R65.20 SEVERE SEPSIS WITHOUT SEPTIC SHOCK: ICD-10-CM

## 2019-05-21 DIAGNOSIS — M81.0 AGE-RELATED OSTEOPOROSIS WITHOUT CURRENT PATHOLOGICAL FRACTURE: ICD-10-CM

## 2019-05-21 DIAGNOSIS — R82.71 BACTERIURIA: ICD-10-CM

## 2019-05-21 DIAGNOSIS — E78.5 HYPERLIPIDEMIA, UNSPECIFIED: ICD-10-CM

## 2019-05-21 DIAGNOSIS — M35.3 POLYMYALGIA RHEUMATICA: ICD-10-CM

## 2019-05-21 DIAGNOSIS — J15.6 PNEUMONIA DUE TO OTHER GRAM-NEGATIVE BACTERIA: ICD-10-CM

## 2019-05-21 DIAGNOSIS — T47.3X5A ADVERSE EFFECT OF SALINE AND OSMOTIC LAXATIVES, INITIAL ENCOUNTER: ICD-10-CM

## 2019-05-21 DIAGNOSIS — B96.20 UNSPECIFIED ESCHERICHIA COLI [E. COLI] AS THE CAUSE OF DISEASES CLASSIFIED ELSEWHERE: ICD-10-CM

## 2019-05-22 LAB
HCV AB S/CO SERPL IA: 0.06 S/CO — SIGNIFICANT CHANGE UP (ref 0–0.99)
HCV AB SERPL-IMP: SIGNIFICANT CHANGE UP

## 2019-06-03 ENCOUNTER — OUTPATIENT (OUTPATIENT)
Dept: OUTPATIENT SERVICES | Facility: HOSPITAL | Age: 69
LOS: 1 days | Discharge: HOME | End: 2019-06-03

## 2019-06-03 DIAGNOSIS — E03.9 HYPOTHYROIDISM, UNSPECIFIED: ICD-10-CM

## 2019-06-03 DIAGNOSIS — M81.0 AGE-RELATED OSTEOPOROSIS WITHOUT CURRENT PATHOLOGICAL FRACTURE: ICD-10-CM

## 2019-06-03 DIAGNOSIS — D64.9 ANEMIA, UNSPECIFIED: ICD-10-CM

## 2019-06-03 DIAGNOSIS — D50.9 IRON DEFICIENCY ANEMIA, UNSPECIFIED: ICD-10-CM

## 2019-06-03 DIAGNOSIS — Z98.890 OTHER SPECIFIED POSTPROCEDURAL STATES: Chronic | ICD-10-CM

## 2019-06-03 DIAGNOSIS — E78.5 HYPERLIPIDEMIA, UNSPECIFIED: ICD-10-CM

## 2019-06-03 DIAGNOSIS — E11.9 TYPE 2 DIABETES MELLITUS WITHOUT COMPLICATIONS: ICD-10-CM

## 2019-06-07 ENCOUNTER — APPOINTMENT (OUTPATIENT)
Dept: CARDIOLOGY | Facility: CLINIC | Age: 69
End: 2019-06-07
Payer: MEDICARE

## 2019-06-07 PROCEDURE — 99214 OFFICE O/P EST MOD 30 MIN: CPT

## 2019-06-07 PROCEDURE — 93000 ELECTROCARDIOGRAM COMPLETE: CPT

## 2019-06-14 ENCOUNTER — EMERGENCY (EMERGENCY)
Facility: HOSPITAL | Age: 69
LOS: 0 days | Discharge: HOME | End: 2019-06-14
Attending: EMERGENCY MEDICINE | Admitting: EMERGENCY MEDICINE
Payer: MEDICARE

## 2019-06-14 VITALS
OXYGEN SATURATION: 99 % | RESPIRATION RATE: 18 BRPM | SYSTOLIC BLOOD PRESSURE: 177 MMHG | HEART RATE: 58 BPM | TEMPERATURE: 97 F | DIASTOLIC BLOOD PRESSURE: 85 MMHG

## 2019-06-14 VITALS
SYSTOLIC BLOOD PRESSURE: 194 MMHG | RESPIRATION RATE: 18 BRPM | DIASTOLIC BLOOD PRESSURE: 87 MMHG | HEART RATE: 63 BPM | OXYGEN SATURATION: 98 % | TEMPERATURE: 97 F

## 2019-06-14 DIAGNOSIS — R20.0 ANESTHESIA OF SKIN: ICD-10-CM

## 2019-06-14 DIAGNOSIS — I25.10 ATHEROSCLEROTIC HEART DISEASE OF NATIVE CORONARY ARTERY WITHOUT ANGINA PECTORIS: ICD-10-CM

## 2019-06-14 DIAGNOSIS — Z79.891 LONG TERM (CURRENT) USE OF OPIATE ANALGESIC: ICD-10-CM

## 2019-06-14 DIAGNOSIS — Z79.2 LONG TERM (CURRENT) USE OF ANTIBIOTICS: ICD-10-CM

## 2019-06-14 DIAGNOSIS — E78.5 HYPERLIPIDEMIA, UNSPECIFIED: ICD-10-CM

## 2019-06-14 DIAGNOSIS — Z79.52 LONG TERM (CURRENT) USE OF SYSTEMIC STEROIDS: ICD-10-CM

## 2019-06-14 DIAGNOSIS — Z98.890 OTHER SPECIFIED POSTPROCEDURAL STATES: Chronic | ICD-10-CM

## 2019-06-14 DIAGNOSIS — Z79.899 OTHER LONG TERM (CURRENT) DRUG THERAPY: ICD-10-CM

## 2019-06-14 DIAGNOSIS — J32.9 CHRONIC SINUSITIS, UNSPECIFIED: ICD-10-CM

## 2019-06-14 DIAGNOSIS — E11.9 TYPE 2 DIABETES MELLITUS WITHOUT COMPLICATIONS: ICD-10-CM

## 2019-06-14 DIAGNOSIS — I10 ESSENTIAL (PRIMARY) HYPERTENSION: ICD-10-CM

## 2019-06-14 DIAGNOSIS — R51 HEADACHE: ICD-10-CM

## 2019-06-14 DIAGNOSIS — Z79.84 LONG TERM (CURRENT) USE OF ORAL HYPOGLYCEMIC DRUGS: ICD-10-CM

## 2019-06-14 LAB
ALBUMIN SERPL ELPH-MCNC: 3.4 G/DL — LOW (ref 3.5–5.2)
ALP SERPL-CCNC: 101 U/L — SIGNIFICANT CHANGE UP (ref 30–115)
ALT FLD-CCNC: 9 U/L — SIGNIFICANT CHANGE UP (ref 0–41)
ANION GAP SERPL CALC-SCNC: 11 MMOL/L — SIGNIFICANT CHANGE UP (ref 7–14)
APPEARANCE UR: CLEAR — SIGNIFICANT CHANGE UP
APTT BLD: 23.2 SEC — CRITICAL LOW (ref 27–39.2)
AST SERPL-CCNC: 8 U/L — SIGNIFICANT CHANGE UP (ref 0–41)
BASE EXCESS BLDV CALC-SCNC: -4 MMOL/L — LOW (ref -2–2)
BASOPHILS # BLD AUTO: 0.01 K/UL — SIGNIFICANT CHANGE UP (ref 0–0.2)
BASOPHILS NFR BLD AUTO: 0.1 % — SIGNIFICANT CHANGE UP (ref 0–1)
BILIRUB SERPL-MCNC: 0.2 MG/DL — SIGNIFICANT CHANGE UP (ref 0.2–1.2)
BILIRUB UR-MCNC: NEGATIVE — SIGNIFICANT CHANGE UP
BUN SERPL-MCNC: 38 MG/DL — HIGH (ref 10–20)
CA-I SERPL-SCNC: 1.21 MMOL/L — SIGNIFICANT CHANGE UP (ref 1.12–1.3)
CALCIUM SERPL-MCNC: 8.6 MG/DL — SIGNIFICANT CHANGE UP (ref 8.5–10.1)
CHLORIDE SERPL-SCNC: 107 MMOL/L — SIGNIFICANT CHANGE UP (ref 98–110)
CO2 SERPL-SCNC: 20 MMOL/L — SIGNIFICANT CHANGE UP (ref 17–32)
COLOR SPEC: YELLOW — SIGNIFICANT CHANGE UP
CREAT SERPL-MCNC: 1.1 MG/DL — SIGNIFICANT CHANGE UP (ref 0.7–1.5)
DIFF PNL FLD: NEGATIVE — SIGNIFICANT CHANGE UP
EOSINOPHIL # BLD AUTO: 0.05 K/UL — SIGNIFICANT CHANGE UP (ref 0–0.7)
EOSINOPHIL NFR BLD AUTO: 0.4 % — SIGNIFICANT CHANGE UP (ref 0–8)
EPI CELLS # UR: ABNORMAL /HPF
GAS PNL BLDV: 139 MMOL/L — SIGNIFICANT CHANGE UP (ref 136–145)
GAS PNL BLDV: SIGNIFICANT CHANGE UP
GLUCOSE SERPL-MCNC: 311 MG/DL — HIGH (ref 70–99)
GLUCOSE UR QL: 100 MG/DL
HCO3 BLDV-SCNC: 22 MMOL/L — SIGNIFICANT CHANGE UP (ref 22–29)
HCT VFR BLD CALC: 33.4 % — LOW (ref 37–47)
HCT VFR BLDA CALC: 35.2 % — SIGNIFICANT CHANGE UP (ref 34–44)
HGB BLD CALC-MCNC: 11.5 G/DL — LOW (ref 14–18)
HGB BLD-MCNC: 10 G/DL — LOW (ref 12–16)
IMM GRANULOCYTES NFR BLD AUTO: 1.9 % — HIGH (ref 0.1–0.3)
INR BLD: 0.9 RATIO — SIGNIFICANT CHANGE UP (ref 0.65–1.3)
KETONES UR-MCNC: NEGATIVE — SIGNIFICANT CHANGE UP
LACTATE BLDV-MCNC: 1.9 MMOL/L — HIGH (ref 0.5–1.6)
LEUKOCYTE ESTERASE UR-ACNC: ABNORMAL
LYMPHOCYTES # BLD AUTO: 2.33 K/UL — SIGNIFICANT CHANGE UP (ref 1.2–3.4)
LYMPHOCYTES # BLD AUTO: 20.6 % — SIGNIFICANT CHANGE UP (ref 20.5–51.1)
MCHC RBC-ENTMCNC: 18.8 PG — LOW (ref 27–31)
MCHC RBC-ENTMCNC: 29.9 G/DL — LOW (ref 32–37)
MCV RBC AUTO: 62.9 FL — LOW (ref 81–99)
MONOCYTES # BLD AUTO: 1.17 K/UL — HIGH (ref 0.1–0.6)
MONOCYTES NFR BLD AUTO: 10.3 % — HIGH (ref 1.7–9.3)
NEUTROPHILS # BLD AUTO: 7.53 K/UL — HIGH (ref 1.4–6.5)
NEUTROPHILS NFR BLD AUTO: 66.7 % — SIGNIFICANT CHANGE UP (ref 42.2–75.2)
NITRITE UR-MCNC: NEGATIVE — SIGNIFICANT CHANGE UP
NRBC # BLD: 0 /100 WBCS — SIGNIFICANT CHANGE UP (ref 0–0)
PCO2 BLDV: 43 MMHG — SIGNIFICANT CHANGE UP (ref 41–51)
PH BLDV: 7.32 — SIGNIFICANT CHANGE UP (ref 7.26–7.43)
PH UR: 6 — SIGNIFICANT CHANGE UP (ref 5–8)
PLATELET # BLD AUTO: 269 K/UL — SIGNIFICANT CHANGE UP (ref 130–400)
PO2 BLDV: 36 MMHG — SIGNIFICANT CHANGE UP (ref 20–40)
POTASSIUM BLDV-SCNC: 4.4 MMOL/L — SIGNIFICANT CHANGE UP (ref 3.3–5.6)
POTASSIUM SERPL-MCNC: 4.5 MMOL/L — SIGNIFICANT CHANGE UP (ref 3.5–5)
POTASSIUM SERPL-SCNC: 4.5 MMOL/L — SIGNIFICANT CHANGE UP (ref 3.5–5)
PROT SERPL-MCNC: 6 G/DL — SIGNIFICANT CHANGE UP (ref 6–8)
PROT UR-MCNC: NEGATIVE MG/DL — SIGNIFICANT CHANGE UP
PROTHROM AB SERPL-ACNC: 10.4 SEC — SIGNIFICANT CHANGE UP (ref 9.95–12.87)
RBC # BLD: 5.31 M/UL — SIGNIFICANT CHANGE UP (ref 4.2–5.4)
RBC # FLD: 20 % — HIGH (ref 11.5–14.5)
SAO2 % BLDV: 62 % — SIGNIFICANT CHANGE UP
SODIUM SERPL-SCNC: 138 MMOL/L — SIGNIFICANT CHANGE UP (ref 135–146)
SP GR SPEC: 1.01 — SIGNIFICANT CHANGE UP (ref 1.01–1.03)
TROPONIN T SERPL-MCNC: <0.01 NG/ML — SIGNIFICANT CHANGE UP
UROBILINOGEN FLD QL: 0.2 MG/DL — SIGNIFICANT CHANGE UP (ref 0.2–0.2)
WBC # BLD: 11.31 K/UL — HIGH (ref 4.8–10.8)
WBC # FLD AUTO: 11.31 K/UL — HIGH (ref 4.8–10.8)
WBC UR QL: SIGNIFICANT CHANGE UP /HPF

## 2019-06-14 PROCEDURE — 93010 ELECTROCARDIOGRAM REPORT: CPT

## 2019-06-14 PROCEDURE — 70450 CT HEAD/BRAIN W/O DYE: CPT | Mod: 26

## 2019-06-14 PROCEDURE — 99284 EMERGENCY DEPT VISIT MOD MDM: CPT

## 2019-06-14 PROCEDURE — 99284 EMERGENCY DEPT VISIT MOD MDM: CPT | Mod: GC

## 2019-06-14 RX ORDER — ACETAMINOPHEN 500 MG
650 TABLET ORAL ONCE
Refills: 0 | Status: COMPLETED | OUTPATIENT
Start: 2019-06-14 | End: 2019-06-14

## 2019-06-14 RX ORDER — INSULIN HUMAN 100 [IU]/ML
8 INJECTION, SOLUTION SUBCUTANEOUS ONCE
Refills: 0 | Status: COMPLETED | OUTPATIENT
Start: 2019-06-14 | End: 2019-06-14

## 2019-06-14 RX ORDER — SODIUM CHLORIDE 9 MG/ML
1000 INJECTION INTRAMUSCULAR; INTRAVENOUS; SUBCUTANEOUS ONCE
Refills: 0 | Status: COMPLETED | OUTPATIENT
Start: 2019-06-14 | End: 2019-06-14

## 2019-06-14 RX ORDER — NIFEDIPINE 30 MG
1 TABLET, EXTENDED RELEASE 24 HR ORAL
Qty: 30 | Refills: 0
Start: 2019-06-14 | End: 2019-07-13

## 2019-06-14 RX ORDER — CIPROFLOXACIN LACTATE 400MG/40ML
1 VIAL (ML) INTRAVENOUS
Qty: 14 | Refills: 0
Start: 2019-06-14 | End: 2019-06-20

## 2019-06-14 RX ADMIN — INSULIN HUMAN 8 UNIT(S): 100 INJECTION, SOLUTION SUBCUTANEOUS at 13:05

## 2019-06-14 RX ADMIN — SODIUM CHLORIDE 1000 MILLILITER(S): 9 INJECTION INTRAMUSCULAR; INTRAVENOUS; SUBCUTANEOUS at 12:45

## 2019-06-14 RX ADMIN — SODIUM CHLORIDE 1000 MILLILITER(S): 9 INJECTION INTRAMUSCULAR; INTRAVENOUS; SUBCUTANEOUS at 13:33

## 2019-06-14 RX ADMIN — Medication 650 MILLIGRAM(S): at 13:45

## 2019-06-14 NOTE — CONSULT NOTE ADULT - SUBJECTIVE AND OBJECTIVE BOX
MALLIKA WILLSON     69y     Female    MRN-[id number]                                                           CC:Patient is a 69y old  Female who presents with a chief complaint of     HPI: 68yo F with PMH of HTN, HLD, and back fxs with chronic LLE weakness presenting to ED with diffuse generalized HA since AM and left facial tingling/numbness x 10 minutes PTA associated with pain to left face. Gradual in onset, hx of HAs in the past, states she feels like she has a HA because her BP is elevated. No history of stroke, recent kyphoplasty at [facility].      ROS:  Constitutional, Neurological, Psychiatric, Eyes, ENT, Cardiovascular, Respiratory, Gastrointestinal, Genitourinary, Musculoskeletal, Integumentary, Endocrine and Heme/Lymph are otherwise negative. Except for above      FAMILY HISTORY:  Family history of thalassemia (Sibling)  Family history of coronary artery disease (Father)      HEALTH ISSUES - PROBLEM Dx:          Vital Signs Last 24 Hrs  T(C): 36.2 (14 Jun 2019 13:45), Max: 36.2 (14 Jun 2019 13:45)  T(F): 97.2 (14 Jun 2019 13:45), Max: 97.2 (14 Jun 2019 13:45)  HR: 63 (14 Jun 2019 13:45) (57 - 63)  BP: 194/87 (14 Jun 2019 13:45) (171/101 - 194/87)  BP(mean): --  RR: 18 (14 Jun 2019 13:45) (18 - 18)  SpO2: 98% (14 Jun 2019 13:45) (97% - 99%)    Physical Exam:  Constitutional: alert and in no acute distress.  Eyes: the sclera and conjunctiva were normal, pupils were equal in size, round, reactive to light, with normal accommodation and extraocular movements were intact.   Back: no costovertebral angle tenderness and no spinal tenderness.      Neuro Exam:  Orientation: oriented to person, oriented to place and oriented to time.   Attention: normal concentrating ability and visual attention was not decreased.   Language: no difficulty naming common objects, no difficulty repeating a phrase, no difficulty writing a sentence, fluency intact, comprehension intact and reading intact.   Fund of knowledge: displays adequate knowledge of personal past history.   Cranial Nerves: visual fields full to confrontation, pupils equal round and reactive to light, extraocular motion intact, facial sensation decreased on left V1-V2, face symmetrical, hearing was intact bilaterally, tongue and palate midline, head turning and shoulder shrug symmetric and there was no tongue deviation with protrusion.   Motor: muscle tone was normal in all four extremities, muscle strength was normal in all four extremities and normal bulk in all four extremities.   Sensory exam: light touch was intact.   Coordination:. normal gait. balance was intact. there was no past-pointing. no tremor present.   Deep tendon reflexes:   1+ throughout  Plantar responses normal on the right, normal on the left.      NIHSS: 1  mrankin 0    Allergies    penicillin (Anaphylaxis; Angioedema)    Intolerances       Home Medications:  atorvastatin 40 mg oral tablet: 1 tab(s) orally once a day (at bedtime) (11 May 2019 01:15)  Januvia 100 mg oral tablet: 1 tab(s) orally once a day (11 May 2019 01:15)  Levemir FlexPen 100 units/mL subcutaneous solution: 30 unit(s) subcutaneous 2 times a day (11 May 2019 01:15)  omeprazole 20 mg oral delayed release capsule: 1 cap(s) orally once a day (11 May 2019 01:15)  Plavix 75 mg oral tablet: 1 tab(s) orally once a day (11 May 2019 01:15)  predniSONE 5 mg oral tablet: 1 tab(s) orally 3 times a day (11 May 2019 01:15)      MEDICATIONS  (STANDING):    MEDICATIONS  (PRN):      LABS:                        10.0   11.31 )-----------( 269      ( 14 Jun 2019 12:01 )             33.4     06-14    138  |  107  |  38<H>  ----------------------------<  311<H>  4.5   |  20  |  1.1    Ca    8.6      14 Jun 2019 12:01    TPro  6.0  /  Alb  3.4<L>  /  TBili  0.2  /  DBili  x   /  AST  8   /  ALT  9   /  AlkPhos  101  06-14    PT/INR - ( 14 Jun 2019 12:01 )   PT: 10.40 sec;   INR: 0.90 ratio         PTT - ( 14 Jun 2019 12:01 )  PTT:23.2 sec        Neuro Imaging:  NCT:   < from: CT Brain Stroke Protocol (06.14.19 @ 12:14) >  IMPRESSION:     1.  Mild periventricular and subcortical white matter chronic small   vessel ischemic changes.    2.  No acute mass effect, midline shift or hemorrhage.      3.  Left posterior ethmoid, sphenoid and maxillary sinus disease.    4.  If the patient continues to be symptomatic follow-up CT scan and/or   MRI of the brain may be helpful for further evaluation.    < end of copied text >      Assessment / Plan: Patient presenting with pain in left face and paresthesias.  Given CT findings unlikely related to a stroke and possibly related to sinus disease.  However given this history of polymyalgia rtheumatica on chronic steroids would check also ESR CRP.  1. ESR, CRP  2. If elevated then obtain MRI brain w/o DEMETRA  3. Continue Plavix and statin

## 2019-06-14 NOTE — CONSULT NOTE ADULT - ASSESSMENT
·	Uncontrolled HTN, likely related to pain and progressive cardiovascular disease  ·	Suspected eth. sinusitis (facial pain/ headache/ CT findings)   ·	T2 DM, recently switched from Insulin to OAD by Dr Gonzales (Endocrin)    R E C   ·	Keep Metoprolol at current home dose in addition to diabetic regimen  ·	Increase PROCARDIA (Nifedipine) to 60 mg once daily (from 30 mg)  ·	Patient to see Dr Micheline Ramos (PCP) and Dr Alonzo (Cardio) soon  ·	Will Follow up [facility] Navigator  ·	Agree with broad-spectrum PO antibiotics for sinus coverage (e.g. levaquin 500 daily).    I discussed all above with patient and family member at bedside at length, all questions answered.   Discussed with ED team

## 2019-06-14 NOTE — ED ADULT NURSE NOTE - CHIEF COMPLAINT QUOTE
Pt BIBA from home, c/o HTN, lightheadedness x few days and L facial tingling sensation [id number]. No facial droop or weakness noted. Assessed by MD [place].

## 2019-06-14 NOTE — ED PROVIDER NOTE - CLINICAL SUMMARY MEDICAL DECISION MAKING FREE TEXT BOX
symptoms are not consistent with tia/stroke - pain with numbness. ct with evid of significant sinusitis. no other symptoms. started on levaquin. increase in htn meds.

## 2019-06-14 NOTE — ED PROVIDER NOTE - ATTENDING CONTRIBUTION TO CARE
70 yo F hx of thn, dm, rheumatic condition on steroids, cad, now with left facial pain and numbness. nl speech, no ha. no vomiting, no extremity weakness.   vss, nontoxic, elderly female, PERRL, no facial asymmetry, no slurred speech, pink conj, anicteric, MMM, neck supple, CTAB, RRR, equal radial pulses bilat, abd soft/nt/nd, no cva tend. no calves tend, no edema, chronic LLE weakness. no pronator drift BUE. no rashes.

## 2019-06-14 NOTE — ED ADULT TRIAGE NOTE - INTERPRETATION SERVICES DECLINED
Pt able to communicate in english; refuses to use the  phone; pt's caregiver at bedside, translating/Patient/Caregiver requests family/friend to interpret. Pt able to communicate in english; refuses to use the  phone; pt's caregiver at bedside, translating as well/Patient/Caregiver requests family/friend to interpret.

## 2019-06-14 NOTE — ED ADULT NURSE NOTE - CHPI ED NUR SYMPTOMS NEG
no nausea/no pain/no vomiting/no fever/no dizziness/no chills/no decreased eating/drinking/no tingling

## 2019-06-14 NOTE — CONSULT NOTE ADULT - PROVIDER SPECIALTY LIST ADULT
Mobility and getting out of bed has been very difficult, patient  -One-on-one feeding, continue full liquid diet    Neurology

## 2019-06-14 NOTE — ED ADULT NURSE NOTE - OBJECTIVE STATEMENT
Patient is a 69 year old female brought in with daughter with complaints of hypertension, lightheadedness x few days and L facial tingling sensation [id number]. No facial droop or slurred speech noted. Code stroke called. Patient denies any difficulty ambulating, dysarthria, chest pain or shortness of breath.

## 2019-06-14 NOTE — ED PROVIDER NOTE - INTERPRETATION SERVICES DECLINED
Pt able to communicate in english; refuses to use the  phone; pt's caregiver at bedside, translating as well/Patient/Caregiver requests family/friend to interpret.

## 2019-06-14 NOTE — ED PROVIDER NOTE - CARE PLAN
Principal Discharge DX:	Facial pain, acute  Secondary Diagnosis:	Facial numbness  Secondary Diagnosis:	Sinus disease Principal Discharge DX:	Facial pain, acute  Secondary Diagnosis:	Facial numbness  Secondary Diagnosis:	Sinus disease  Secondary Diagnosis:	Headache

## 2019-06-14 NOTE — ED PROVIDER NOTE - NS ED ROS FT
Constitutional:  No fevers or chills.  Eyes:  No visual changes or discharge.  ENT:  No hearing changes. No sore throat.  Neck:  No neck pain or stiffness.  Cardiac:  No CP or edema.  Resp:  No cough or SOB.   GI:  No nausea, vomiting, diarrhea, or abdominal pain.  :  No dysuria, frequency, or hematuria.  MSK:  No myalgias or joint pain/swelling.  Neuro:  +HA, with pain behind L eye. No dizziness or weakness.  Skin:  No skin rash.

## 2019-06-14 NOTE — ED CLERICAL - NS ED CLERK NOTE PRE-ARRIVAL INFORMATION; ADDITIONAL PRE-ARRIVAL INFORMATION
This patient is enrolled in the STARS readmission reduction initiative and has active care navigation. This patient can be followed up by the care navigation team within 24 hours.  To arrange close follow-up or to obtain additional clinical information, please call the contact number above. The on call [facility] Hospitalist has been notified and will coordinate care in concert with the ED Physician including consults as necessary. Call [phone] (North), [phone] (South) to reach the hospitalist. Consider CDU for management per guidelines

## 2019-06-14 NOTE — CONSULT NOTE ADULT - SUBJECTIVE AND OBJECTIVE BOX
MALLIKA WILLSON  69y, Female  Allergy: penicillin (Anaphylaxis; Angioedema)    Hospital Day:     Patient seen and examined earlier today.     PMH/PSH:  PAST MEDICAL & SURGICAL HISTORY:  Anemia  Osteoporosis  Rheumatoid arthritis  Dyslipidemia  HTN (hypertension)  CAD (coronary artery disease)  Diabetes  History of hemorrhoidectomy    VITALS:  T(F): 97 (06-14-19 @ 12:15), Max: 97 (06-14-19 @ 11:42)  HR: 59 (06-14-19 @ 12:45)  BP: 179/84 (06-14-19 @ 12:45) (171/101 - 182/83)  RR: 18 (06-14-19 @ 12:45)  SpO2: 97% (06-14-19 @ 12:45)    TESTS & MEASUREMENTS:                            10.0   11.31 )-----------( 269      ( 14 Jun 2019 12:01 )             33.4     PT/INR - ( 14 Jun 2019 12:01 )   PT: 10.40 sec;   INR: 0.90 ratio         PTT - ( 14 Jun 2019 12:01 )  PTT:23.2 sec  06-14    138  |  107  |  38<H>  ----------------------------<  311<H>  4.5   |  20  |  1.1    Ca    8.6      14 Jun 2019 12:01    TPro  6.0  /  Alb  3.4<L>  /  TBili  0.2  /  DBili  x   /  AST  8   /  ALT  9   /  AlkPhos  101  06-14    LIVER FUNCTIONS - ( 14 Jun 2019 12:01 )  Alb: 3.4 g/dL / Pro: 6.0 g/dL / ALK PHOS: 101 U/L / ALT: 9 U/L / AST: 8 U/L / GGT: x           CARDIAC MARKERS ( 14 Jun 2019 12:01 )  x     / <0.01 ng/mL / x     / x     / x          RADIOLOGY & ADDITIONAL TESTS:  < from: CT Brain Stroke Protocol (06.14.19 @ 12:14) >    1.  Mild periventricular and subcortical white matter chronic small   vessel ischemic changes.    2.  No acute mass effect, midline shift or hemorrhage.      3.  Left posterior ethmoid, sphenoid and maxillary sinus disease.        MEDICATIONS:  MEDICATIONS  (STANDING):  levoFLOXacin IVPB 500 milliGRAM(s) IV Intermittent once    MEDICATIONS  (PRN):  acetaminophen   Tablet .. 650 milliGRAM(s) Oral once PRN Moderate Pain (4 - 6), Severe Pain (7 - 10)      HOME MEDICATIONS:  atorvastatin 40 mg oral tablet (05-11)  Januvia 100 mg oral tablet (05-11)  Levemir FlexPen 100 units/mL subcutaneous solution (05-11)  omeprazole 20 mg oral delayed release capsule (05-11)  Plavix 75 mg oral tablet (05-11)  predniSONE 5 mg oral tablet (05-11)      PHYSICAL EXAM:  GENERAL: A&O x3,  in NAD/P,   NECK: No Swelling  CHEST/LUNG: Good air entry, No wheezing  HEART: RRR, No murmurs  ABDOMEN: Soft, Bowel sounds present  EXTREMITIES:  No clubbing, MALLIKA WILLSON  69y, Female  Allergy: penicillin (Anaphylaxis; Angioedema)        As per patient's request: Translation by daughter (Oseas)   Patient seen and examined earlier today.     VITALS:  T(F): 97 (06-14-19 @ 12:15), Max: 97 (06-14-19 @ 11:42)  HR: 59 (06-14-19 @ 12:45)  BP: 179/84 (06-14-19 @ 12:45) (171/101 - 182/83)Un  RR: 18 (06-14-19 @ 12:45)  SpO2: 97% (06-14-19 @ 12:45)    TESTS & MEASUREMENTS:                            10.0   11.31 )-----------( 269      ( 14 Jun 2019 12:01 )             33.4     PT/INR - ( 14 Jun 2019 12:01 )   PT: 10.40 sec;   INR: 0.90 ratio         PTT - ( 14 Jun 2019 12:01 )  PTT:23.2 sec  06-14    138  |  107  |  38<H>  ----------------------------<  311<H>  4.5   |  20  |  1.1    Ca    8.6      14 Jun 2019 12:01    TPro  6.0  /  Alb  3.4<L>  /  TBili  0.2  /  DBili  x   /  AST  8   /  ALT  9   /  AlkPhos  101  06-14    LIVER FUNCTIONS - ( 14 Jun 2019 12:01 )  Alb: 3.4 g/dL / Pro: 6.0 g/dL / ALK PHOS: 101 U/L / ALT: 9 U/L / AST: 8 U/L / GGT: x           CARDIAC MARKERS ( 14 Jun 2019 12:01 )  x     / <0.01 ng/mL / x     / x     / x          RADIOLOGY & ADDITIONAL TESTS:  < from: CT Brain Stroke Protocol (06.14.19 @ 12:14) >    1.  Mild periventricular and subcortical white matter chronic small   vessel ischemic changes.    2.  No acute mass effect, midline shift or hemorrhage.      3.  Left posterior ethmoid, sphenoid and maxillary sinus disease.        MEDICATIONS:  MEDICATIONS  (STANDING):  levoFLOXacin IVPB 500 milliGRAM(s) IV Intermittent once    MEDICATIONS  (PRN):  acetaminophen   Tablet .. 650 milliGRAM(s) Oral once PRN Moderate Pain (4 - 6), Severe Pain (7 - 10)      HOME MEDICATIONS:  atorvastatin 40 mg oral tablet (05-11)  Januvia 100 mg oral tablet (05-11)  Levemir FlexPen 100 units/mL subcutaneous solution (05-11)  omeprazole 20 mg oral delayed release capsule (05-11)  Plavix 75 mg oral tablet (05-11)  predniSONE 5 mg oral tablet (05-11)  METOPROLOL   PROCARDIA      PHYSICAL EXAM:  GENERAL: A&O x3,  in NAD/P,   NECK: No Swelling  CHEST/LUNG: Good air entry, No wheezing  HEART: RRR, No murmurs  ABDOMEN: Soft, Bowel sounds present  EXTREMITIES:  No clubbing,   N/E: no gross neuro deficit

## 2019-06-14 NOTE — ED PROVIDER NOTE - PROGRESS NOTE DETAILS
69 year old female biba for hypertension x several days with left sided facial numnbess that began 10 minutes prior. dr. castaneda bedside, evaluated patient, patient has been hypertensive had a NP at home who doubled her medication , dr. castaneda will be looking at her medications to increase her BP meds more, as per dr. castaneda her symptoms resolved and would prefer patient go to obs for TIA workup Will repeat fingerstick. Will give dose of Levaquin 500mg. dr. castaneda bedside, evaluated patient, patient has been hypertensive had a NP at home who doubled her medication , dr. castaneda will be looking at her medications to increase her BP meds more, as per dr. castaneda her symptoms resolved case d/w dr clements. symptoms are not consistent with tia/stroke - pain with numbness. ct with evid of sinusitis. no other symptoms. started on levaquin. increase in htn meds. Informed patient and family of lab and radiology results. Doubled dose of Procardia to 60mg per Dr. Forrest's recommendations. Patient given Levaquin 500mg BID x 7 days.  Full DC instructions and precaution signs and symptoms discussed. Proper follow up ensured.  Medications administered and prescribed/OTC home meds discussed.  All questions and concerns from patient addressed.  Understanding of instructions verbalized. Informed patient and family of lab and radiology results. Doubled dose of Procardia to 60mg per Dr. Forrest's recommendations. Patient given Levaquin 500mg x 7 days.  Full DC instructions and precaution signs and symptoms discussed. Proper follow up ensured.  Medications administered and prescribed/OTC home meds discussed.  All questions and concerns from patient addressed.  Understanding of instructions verbalized. Levaquin rx was corrected to Levaquin 500mg once daily as opposed to BID, spoke with pharmacy.

## 2019-06-14 NOTE — ED ADULT TRIAGE NOTE - CHIEF COMPLAINT QUOTE
Pt BIBA from home, c/o HTN, lightheadedness x few days and L facial tingling sensation [id number]. No facial droop or weakness noted. Pt BIBA from home, c/o HTN, lightheadedness x few days and L facial tingling sensation [id number]. No facial droop or weakness noted. Assessed by MD [place].

## 2019-06-14 NOTE — ED PROVIDER NOTE - PHYSICAL EXAMINATION
PHYSICAL EXAM: I have reviewed current vital signs.  GENERAL: NAD, well-nourished; well-developed.  HEAD:  Normocephalic, atraumatic.  EYES: EOMI, PERRL, conjunctiva and sclera clear.  ENT: MMM, no erythema/exudates.  NECK: Supple, no JVD, full ROM, no meningismus.  CHEST/LUNG: Clear to auscultation bilaterally; no wheezes, rales, or rhonchi.  HEART: Regular rate and rhythm, normal S1 and S2; no murmurs, rubs, or gallops.  ABDOMEN: Soft, nontender, nondistended.  EXTREMITIES:  2+ peripheral pulses; +chronic LLE weakness with 3/5 strength, all other extremities 5/5 strength.  PSYCH: Cooperative, appropriate, normal mood and affect.  NEUROLOGY: A&O x 3. Motor 5/5 in all extremities aside from LLE (not new). Sensory intact. No new focal neurological deficits. CN II - XII intact. No dysmetria, facial droop, or pronator drift.  SKIN: Warm and dry.

## 2019-06-14 NOTE — ED PROVIDER NOTE - NSFOLLOWUPINSTRUCTIONS_ED_ALL_ED_FT
Please follow-up as soon as possible with your primary care doctor. Your dose of Procardia was doubled to 60mg for better blood pressure control. You were also given an antibiotic, Levaquin, for your sinusitis.    Sinusitis, Adult  Sinusitis is soreness and swelling (inflammation) of your sinuses. Sinuses are hollow spaces in the bones around your face. They are located:  Around your eyes.  In the middle of your forehead.  Behind your nose.  In your cheekbones.  Your sinuses and nasal passages are lined with a stringy fluid (mucus). Mucus normally drains out of your sinuses. Swelling can trap mucus in your sinuses. This lets germs like bacteria or viruses grow, and that leads to infection. Most of the time, sinusitis is caused by a virus.    If bacteria is causing your infection, your doctor may have you wait and see if you get better without antibiotic medicine. You may be prescribed antibiotics if you have:  A very bad infection.  A weak disease-fighting (immune) system.  Follow these instructions at home:  Medicines     Take, use, or apply over-the-counter and prescription medicines only as told by your doctor. These may include nasal sprays.  If you were prescribed an antibiotic, take it as told by your doctor. Do not stop taking the antibiotic even if you start to feel better.  Hydrate and Humidify     Drink enough water to keep your pee (urine) pale yellow.  Use a cool mist humidifier to keep the humidity level in your home above 50%.  Breathe in steam for 10–15 minutes, 3–4 times a day or as told by your doctor. You can do this in the bathroom while a hot shower is running.  Try not to spend time in cool or dry air.  Rest     Rest as much as possible.  Sleep with your head raised (elevated).  Make sure to get enough sleep each night.  General instructions     Put a warm, moist washcloth on your face 3–4 times a day, or as often as told by your doctor. This will help with discomfort.  Wash your hands often with soap and water. If there is no soap and water, use hand .  Do not smoke. Avoid being around people who are smoking (secondhand smoke).  Keep all follow-up visits as told by your doctor. This is important.  Contact a doctor if:  You have a fever.  Your symptoms get worse.  Your symptoms do not get better within 10 days.  Get help right away if:  You have a very bad headache.  You cannot stop throwing up (vomiting).  You have pain or swelling around your face or eyes.  You have trouble seeing.  You feel confused.  Your neck is stiff.  You have trouble breathing.      Headache  A headache is pain or discomfort felt around the head or neck area. The specific cause of a headache may not be found as there are many types including tension headaches, migraine headaches, and cluster headaches. Watch your condition for any changes. Things you can do to manage your pain include taking over the counter and prescription medications as instructed by your health care provider, lying down in a dark quiet room, limiting stress, getting regular sleep, and refraining from alcohol and tobacco products.    SEEK IMMEDIATE MEDICAL CARE IF YOU HAVE ANY OF THE FOLLOWING SYMPTOMS: fever, vomiting, stiff neck, loss of vision, problems with speech, muscle weakness, loss of balance, trouble walking, passing out, or confusion.

## 2019-06-14 NOTE — ED ADULT TRIAGE NOTE - DIRECT TO ROOM CARE INITIATED:
14-Jun-2019 11:51
REVIEW OF SYSTEMS:    General: [ x] negative  [ ] abnormal:   Respiratory: [ x] negative  [ ] abnormal:  Cardiovascular: [ x] negative  [ ] abnormal:  Gastrointestinal:[x ] negative  [ ] abnormal:  Genitourinary: [ x] negative  [ ] abnormal:  Musculoskeletal: [x ] negative  [ ] abnormal:  Endocrine: [ ] negative  [ ] abnormal:   Heme/Lymph: [ ] negative  [ ] abnormal:   Neurological: [ ] negative  [ x] abnormal: see hpi  Skin: [x ] negative  [ ] abnormal:   Psychiatric: [ ] negative  [ ] abnormal:   Allergy and Immunologic: [ ] negative  [ ] abnormal:   All other systems reviewed and negative: [ ]

## 2019-06-14 NOTE — ED ADULT NURSE REASSESSMENT NOTE - NS ED NURSE REASSESS COMMENT FT1
patient assessed, patient complains of headache and left side facial numbness which resolved at this time, code stroke called, labs sent . 8 units of regular insulin administered as per MD orders, labs sent pending results. BP elevated, MD aware, cardiac monitoring maintained, will continue to reassess and monitor.

## 2019-06-14 NOTE — ED PROVIDER NOTE - OBJECTIVE STATEMENT
70yo F with PMH of HTN, HLD, and back fxs with chronic LLE weakness presenting to ED with diffuse generalized HA since AM and left facial tingling/numbness x 10 minutes PTA. Gradual in onset, hx of HAs in the past, states she feels like she has a HA because her BP is elevated. Denies any injuries/traumas/falls, changes in vision, speech difficulty, CP, SOB, abd pain, n/v/d, or increased/new extremity weakness. Never smoker.    Activated stroke code immediately upon arrival. Dr. Cy Canela evaluated patient.

## 2019-07-01 ENCOUNTER — APPOINTMENT (OUTPATIENT)
Dept: CARDIOLOGY | Facility: CLINIC | Age: 69
End: 2019-07-01

## 2019-07-08 ENCOUNTER — APPOINTMENT (OUTPATIENT)
Dept: CARDIOLOGY | Facility: CLINIC | Age: 69
End: 2019-07-08

## 2019-08-14 NOTE — DISCHARGE NOTE NURSING/CASE MANAGEMENT/SOCIAL WORK - NSDCPETBCESMAN_GEN_ALL_CORE
Detail Level: Generalized If you are a smoker, it is important for your health to stop smoking. Please be aware that second hand smoke is also harmful. Detail Level: Zone

## 2019-10-08 ENCOUNTER — OUTPATIENT (OUTPATIENT)
Dept: OUTPATIENT SERVICES | Facility: HOSPITAL | Age: 69
LOS: 1 days | Discharge: HOME | End: 2019-10-08

## 2019-10-08 DIAGNOSIS — D64.9 ANEMIA, UNSPECIFIED: ICD-10-CM

## 2019-10-08 DIAGNOSIS — E11.9 TYPE 2 DIABETES MELLITUS WITHOUT COMPLICATIONS: ICD-10-CM

## 2019-10-08 DIAGNOSIS — D50.9 IRON DEFICIENCY ANEMIA, UNSPECIFIED: ICD-10-CM

## 2019-10-08 DIAGNOSIS — Z98.890 OTHER SPECIFIED POSTPROCEDURAL STATES: Chronic | ICD-10-CM

## 2019-10-08 DIAGNOSIS — D51.9 VITAMIN B12 DEFICIENCY ANEMIA, UNSPECIFIED: ICD-10-CM

## 2019-10-08 DIAGNOSIS — E78.5 HYPERLIPIDEMIA, UNSPECIFIED: ICD-10-CM

## 2019-10-08 DIAGNOSIS — M81.0 AGE-RELATED OSTEOPOROSIS WITHOUT CURRENT PATHOLOGICAL FRACTURE: ICD-10-CM

## 2019-10-08 DIAGNOSIS — E03.9 HYPOTHYROIDISM, UNSPECIFIED: ICD-10-CM

## 2019-10-21 ENCOUNTER — APPOINTMENT (OUTPATIENT)
Dept: HEMATOLOGY ONCOLOGY | Facility: CLINIC | Age: 69
End: 2019-10-21

## 2021-04-11 NOTE — PATIENT PROFILE ADULT - NSPROHMCARDIOMGMTSTRAT_GEN_A_NUR
As certified below, I, or a nurse practitioner or physician assistant working with me, had a face-to-face encounter that meets the physician face-to-face encounter requirements.
medication therapy

## 2022-05-18 NOTE — DISCHARGE NOTE PROVIDER - NSDCADMDATE_GEN_ALL_CORE_FT
11-May-2019 00:01 [FreeTextEntry1] : swelling after under eye filler [de-identified] : 36 year old female here for swelling after under eye filler 3 month prior. slightly improved but still present. s/p hylenex x2.

## 2022-06-21 NOTE — ED ADULT NURSE NOTE - NSSUSCREENINGQ1_ED_ALL_ED
Lightheadedness   AMBULATORY CARE:   Lightheadedness  is the feeling that you may faint, but you do not  Your heartbeat may be fast or feel like it flutters  Lightheadedness may occur when you take certain medicines, such as medicine to lower your blood pressure  Dehydration, low sodium, low blood sugar, an abnormal heart rhythm, and anxiety are other common causes  Seek care immediately if:   You have sudden chest pain  You have trouble breathing or shortness of breath  You have vision changes, are sweating, and have nausea while you are sitting or lying down  You feel flushed and your heart is fluttering  You pass out  Contact your healthcare provider if:   You feel lightheaded often  Your heart beats faster or slower than usual      You have questions or concerns about your condition or care  Manage your symptoms:  Talk with your healthcare provider about these and other ways to manage your symptoms:  Lie down  when you feel lightheaded, your throat gets tight, or your vision changes  Raise your legs above the level of your heart  Stand up slowly  Sit on the side of the bed or couch for a few minutes before you stand up  Take slow, deep breaths when you feel lightheaded  This can help decrease the feeling that you might faint  Ask if you need to avoid hot baths and saunas  These may make your symptoms worse  Watch for signs of low blood sugar: These include hunger, nervousness, sweating, and fast or fluttery heartbeats  Talk with your healthcare provider about ways to keep your blood sugar level steady  Check your blood pressure often:  You should do this especially if you take medicine to lower your blood pressure  Check your blood pressure when you are lying down and when you are standing  Ask how often to check during the day  Keep a record of your blood pressure numbers  Your healthcare provider may use the record to help plan your treatment    Keep a record of your lightheadedness episodes:  Include your symptoms and your activity before and after the episode  The record can help your healthcare provider find the cause of your lightheadedness and help you manage episodes  Follow up with your healthcare provider as directed: You may need more tests to help find the cause of your lightheadedness  The tests will help healthcare providers plan the best treatment for you  Write down your questions so you remember to ask them during your visits  © Copyright [employer] 2022 Information is for End User's use only and may not be sold, redistributed or otherwise used for commercial purposes  All illustrations and images included in CareNotes® are the copyrighted property of [employer] M , Inc  or [facility] [employer]   The above information is an  only  It is not intended as medical advice for individual conditions or treatments  Talk to your doctor, nurse or pharmacist before following any medical regimen to see if it is safe and effective for you  Acute Cough   WHAT YOU NEED TO KNOW:   What is an acute cough? An acute cough can last up to 3 weeks  Common causes of an acute cough include a cold, allergies, or a lung infection  How is the cause of an acute cough diagnosed? Your healthcare provider will examine you and listen to your lungs  Tell your healthcare provider if you cough up any mucus, or have a fever or shortness of breath  Also tell your provider what makes the cough better or worse  Depending on your symptoms, you may need a chest x-ray  A sample of mucus may be collected and tested for infection  How is an acute cough treated? An acute cough usually goes away on its own  Ask your healthcare provider about medicines you can take to decrease your cough  You may need medicine to stop the cough, decrease swelling in your airways, or help open your airways  Medicine may also be given to help you cough up mucus   If you have an infection caused by bacteria, you may need antibiotics  What can I do to manage my cough? Do not smoke and stay away from others who smoke  Nicotine and other chemicals in cigarettes and cigars can cause lung damage and make your cough worse  Ask your healthcare provider for information if you currently smoke and need help to quit  E-cigarettes or smokeless tobacco still contain nicotine  Talk to your healthcare provider before you use these products  Drink extra liquids as directed  Liquids will help thin and loosen mucus so you can cough it up  Liquids will also help prevent dehydration  Examples of good liquids to drink include water, fruit juice, and broth  Do not drink liquids that contain caffeine  Caffeine can increase your risk for dehydration  Ask your healthcare provider how much liquid to drink each day  Rest as directed  Do not do activities that make your cough worse, such as exercise  Use a humidifier or vaporizer  Use a cool mist humidifier or a vaporizer to increase air moisture in your home  This may make it easier for you to breathe and help decrease your cough  Eat 2 to 5 mL of honey 2 times each day  Honey can help thin mucus and decrease your cough  Use cough drops or lozenges  These can help decrease throat irritation and your cough  When should I seek immediate care? You have trouble breathing or feel short of breath  You cough up blood, or you see blood in your mucus  You faint or feel weak or dizzy  You have chest pain when you cough or take a deep breath  You have new wheezing  When should I contact my healthcare provider? You have a fever  Your cough lasts longer than 4 weeks  Your symptoms do not improve with treatment  You have questions or concerns about your condition or care  CARE AGREEMENT:   You have the right to help plan your care  Learn about your health condition and how it may be treated   Discuss treatment options with your healthcare providers to decide what care you want to receive  You always have the right to refuse treatment  The above information is an  only  It is not intended as medical advice for individual conditions or treatments  Talk to your doctor, nurse or pharmacist before following any medical regimen to see if it is safe and effective for you  © Copyright [employer] 2022 Information is for End User's use only and may not be sold, redistributed or otherwise used for commercial purposes  All illustrations and images included in CareNotes® are the copyrighted property of [employer] M , Inc  or [employer]  How To Wash Your Hands   WHAT YOU NEED TO KNOW:   Your hands carry germs even when they look clean  Germs can spread when you touch someone, or when you touch a surface or object with germs on it  Wash your hands often to help prevent you from getting sick or spreading germs to others  DISCHARGE INSTRUCTIONS:   How to wash your hands correctly:   Wet your hands with clean, running water  Apply soap  Rub the soap over the front and back of your hands and between your fingers  Use the fingers of one hand to scrub under the fingernails of the other hand  Rub your hands together for 20 seconds  This is about the amount of time it takes to sing the happy birthday song 2 times  Rinse your hands well under running water  Dry your hands with a clean towel or paper towel, or let them air dry  Use a paper towel to turn the faucet off  If you share a bathroom with other people, use the paper towel to open the door when you leave  Hand  gels and wipes can be used to clean your hands if soap and water are not available  The alcohol in the  helps kill germs on your hands  Rub the gel all over your hands until it dries      When to wash your hands:   Before you prepare, cook, and eat food    After you cough, sneeze, or blow your nose    After you go to the bathroom, help a child go to the bathroom, or change a diaper    After you touch an animal or clean up animal waste    Before and after you clean or care for a cut or other wound    Before and after you touch someone who is sick    After you touch garbage    Other tips to know:   Do not touch your face without washing your hands first   You can transfer a virus or other germs from your hands to your face  This can quickly lead to an infection  Cover a cough or sneeze  Germs can travel in droplets that come from your airway when you sneeze or cough  Droplets can travel 3 to 6 feet (1 to 2 meters)  Anyone who breathes in the droplets or gets them in his or her eyes can become infected with the germs  Use a tissue that covers your mouth or nose  Then throw the tissue away and wash your hands or use hand   Cough or sneeze into the bend of your arm if you do not have a tissue  Teach children how to cover a cough or sneeze properly  Remind them to wash their hands after they cough or sneeze  Teach children how to wash their hands correctly  Put hand soap within easy reach of the sink  Have children use a timer or teach them a song to sing so they know how long to wash their hands  Clean surfaces often  Use a disinfecting wipe, a single-use sponge, or a cloth you can wash and reuse  Use disinfecting  if you do not have wipes  You can create a disinfecting  by mixing 1 part bleach with 10 parts water  Clean kitchen countertops, cooking surfaces, and the fronts and insides of the microwave and refrigerator  Clean the bath tub, toilet, the area around the toilet, the sink, the area around the sink, and all faucets  Also clean computer keyboards, phones, and doorknobs often  © Copyright [employer] 2022 Information is for End User's use only and may not be sold, redistributed or otherwise used for commercial purposes   All illustrations and images included in CareNotes® are the copyrighted property of [employer]  or [address]   The above information is an  only  It is not intended as medical advice for individual conditions or treatments  Talk to your doctor, nurse or pharmacist before following any medical regimen to see if it is safe and effective for you  COVID-19 and Chronic Health Conditions   WHAT YOU NEED TO KNOW:   Your chronic condition may increase your risk for COVID-19 or serious problems it can cause  Healthcare providers might need to make changes that affect how you usually manage your chronic health condition  Providers may change hours of operation or not have patients come in to be seen  You may not be able to make appointments to get blood drawn or to have tests or procedures  This may continue until the virus that causes COVID-19 is controlled  Until then, you can take steps to manage your condition  The steps will also lower your risk for COVID-19 or the serious problems it causes  If you do develop COVID-19, healthcare providers will tell you when it is okay to be around others after you recover  This depends on your chronic condition, any symptoms of COVID-19 that developed, and how severe the symptoms were  DISCHARGE INSTRUCTIONS:   Call your local emergency number [phone] in the [address] [place] [address],[address]) or an emergency department if:   You have trouble breathing or shortness of breath  You have chest pain or pressure that lasts longer than 5 minutes  You become confused or hard to wake  Your lips or face are blue  Return to the emergency department if:   You have a fever of 104°F (40°C) or higher  Call your doctor or healthcare provider if:   You have symptoms of COVID-19  You have questions or concerns about COVID-19 or your chronic condition  What you need to know about serious problems from the virus:  You may develop long-term health problems caused by the virus  Your risk is higher if you are 65 or older   A weak immune system, obesity, diabetes, chronic kidney disease, or a heart or lung condition can also increase your risk  Your risk is also higher if you are a current or former cigarette smoker  COVID-19 can lead to any of the following:  Multisymptom inflammatory syndrome in adults (MIS-A) or in children (MIS-C), causing inflammation in the heart, digestive system, skin, or brain    Shortness of breath, serious lower respiratory conditions, such as pneumonia or acute respiratory distress syndrome (ARDS)    Blood clots or blood vessel damage    Organ damage from a lack of oxygen or from blood clots    Sleep problems    Problems thinking clearly, remembering information, or concentrating    Mood changes, depression, or anxiety    Long-term problems tasting or smelling    Loss of appetite and weight loss    Nerve pain    Fatigue (feeling mentally and physically tired)    How the 2019 coronavirus spreads: The virus spreads quickly and easily  The virus can be passed starting 2 to 3 days before symptoms begin or before a positive test if symptoms never begin  Droplets are the main way all coronaviruses spread  The virus travels in droplets that form when a person talks, sings, coughs, or sneezes  The droplets can also float in the air for minutes or hours  Infection happens when you breathe in the droplets or get them in your eyes or nose  Close personal contact with an infected person increases your risk for infection  This means being within 6 feet (2 meters) of the person for at least 15 minutes over 24 hours  Person-to-person contact can spread the virus  For example, a person with the virus on his or her hands can spread it by shaking hands with someone  The virus can stay on objects and surfaces for up to 3 days  You may become infected by touching the object or surface and then touching your eyes or mouth      Manage your chronic health condition during this time:  If you do not have a regular healthcare provider, experts recommend you contact a local [facility] or health department  The following can help you manage your condition and prevent COVID-19:  Get emergency care for your condition if needed  Talk to your healthcare providers about symptoms of your chronic condition that need immediate care  Your providers can help you create a plan or add exacerbation management to your plan  The plan will include when to go to an emergency department and when to call your local emergency number  This will depend on where you live and the services that are available during this time  Go to dialysis appointments as scheduled  It is important to stay on schedule  You will need to have enough food to be able to follow the emergency diet plan if you must miss a session  The emergency diet needs to be part of the management plan for your condition  Reschedule any upcoming appointments as needed  Medical facilities may be closed until the coronavirus is better controlled  This means you may need to reschedule a surgery, procedure, or check-up appointment  If you cannot have a phone or video appointment, you will need to make a new appointment  Some providers may be scheduling appointments several months in advance  Some surgeries and procedures will happen as scheduled  This depends on the medical condition and the reason for the surgery or procedure  You may need to have extra testing for COVID-19 several days before  Follow any regular management plan you use  Your healthcare provider will tell you if you need to make any changes to your regular management plan  For example, if you have asthma, continue to follow your asthma action plan  If you have diabetes, you may need to check your blood sugar level more often  Stress and illness can make blood sugar levels go up  You may need to adjust medicine such as insulin  If you have a heart condition or high blood pressure, you may need to check your blood pressure more often   Stress and illness can also raise your blood pressure  Talk to your healthcare providers about your medicines  You may be able to get more than 1 month of medicine at a time  This will lower the number of times you need to go to a pharmacy to get your medicines  Make sure you have enough medicine if you have a condition that can lead to an emergency  Examples include asthma medicines, insulin, or an epinephrine pen  Check the expiration dates on the medicines you currently have  Ask for refills as soon as possible, if needed  If it is not time to refill prescriptions, you may be able to get an emergency supply of some medicines  Medicine plans vary, so ask your healthcare provider or pharmacist for options  Have supplies available in your home  If possible, get extra supplies you use regularly  Examples include absorbent pads, syringes, and wound cleaning solutions  This will limit the number of trips out of your home to get supplies  Know the signs and symptoms of COVID-19  Signs and symptoms usually start about 5 days after infection but can take 2 to 14 days  Signs and symptoms range from mild to severe  You may feel like you have the flu or a bad cold  Your chronic health condition may cause some of the same symptoms COVID-19 causes  This can make it hard to know if a symptom is from COVID-19 or your chronic condition  Keep a record of any new or worsening symptom you have  This is especially important if you have a condition that often causes shortness of breath  Your provider can tell you if you should be tested for COVID-19   Tell your healthcare provider if you think you were infected but develop signs or symptoms not listed below:    A cough    Shortness of breath or trouble breathing that may become severe    A fever of at least 100 4°F, or 38°C (may be lower in adults 65 or older)    Chills that might include shaking    Muscle pain, body aches, or a headache    A sore throat    Suddenly not being able to taste or smell anything    Feeling very tired (fatigue)    Congestion (stuffy head and nose), or a runny nose    Diarrhea, nausea, or vomiting    What you can do to prevent having to go out of your home during this time:   Ask your healthcare provider for other ways to have appointments  Some providers offer phone, video, or other types of appointments  Have food, medicines, and other supplies delivered  Some pharmacies can send certain medicines to you through the mail  Grocery stores and restaurants may be able to deliver food and other items  If possible, have delivered items placed somewhere  Try not to have someone hand you an item  You will be so close to the person that the virus can spread between you  Ask someone to get items you need  The person can get groceries, medicines, or other needed items for you  Choose a person who does not have signs or symptoms of COVID-19 or has tested negative for it  The person should not be waiting for test results  He or she should not have a condition that increases the risk for COVID-19 or serious problems it causes  What you need to know about COVID-19 vaccines: Your healthcare provider can give you more information about what to expect, depending on your chronic condition  You are considered fully vaccinated against COVID-19 two weeks after the final dose of any COVID-19 vaccine  Let your healthcare provider know when you have received the final dose of the vaccine  Make a copy of your vaccination card  Keep the original with you in case you need to show it  Keep the copy in a safe place  COVID-19 vaccines are given as a shot in 1 or 2 doses  Vaccination is recommended for everyone 5 years or older  One 2-dose vaccine is fully approved  for those 16 years or older  This vaccine also has an emergency use authorization (EUA) for children 11to 13years old  No vaccine is currently available for children younger than 5 years      A booster (additional) dose  is given to help the immune system continue to protect against severe COVID-19  A booster is recommended for all adults 18 or older  The booster can be a different brand of the COVID-19 vaccine than you originally received  The timing for the booster depends on the type of vaccine you received:    1-dose vaccine: The booster is given at least 2 months after you received the vaccine  2-dose vaccine: The booster is given at least 5 or 6 months after the second dose  A booster can be given to adolescents 15to 16years old  Only 1 COVID-19 vaccine has this EUA  The booster is given at least 5 months after the second dose of the original vaccine series  A booster is recommended for immunocompromised children 11to 6years old  Only 1 COVID-19 vaccine has this EUA  The booster is given 28 days after the second dose  Continue social distancing and other measures, even after you get the vaccine  Although it is not common, you can become infected after you get the vaccine  You may also be able to pass the virus to others without knowing you are infected  After you get the vaccine, check local, national, and international travel rules  You may need to be tested before you travel  Some countries require proof of a negative test before you travel  You may also need to quarantine after you return  Medicine may be given to prevent infection  The medicine can be given if you are at high risk for infection and cannot get the vaccine  It can also be given if your immune system does not respond well to the vaccine  Lower your risk for COVID-19:   Wash your hands often throughout the day  Use soap and water  Rub your soapy hands together, lacing your fingers, for at least 20 seconds  Rinse with warm, running water  Dry your hands with a clean towel or paper towel  Use hand  that contains alcohol if soap and water are not available   Teach children how to wash their hands and use hand   Cover sneezes and coughs  Turn your face away and cover your mouth and nose with a tissue  Throw the tissue away  Use the bend of your arm if a tissue is not available  Then wash your hands with soap and water or use hand   Teach children how to cover a cough or sneeze  Follow worldwide, national, and local social distancing guidelines  Keep at least 6 feet (2 meters) between you and others  Wear a face covering (mask) around anyone who does not live in your home  Use a cloth covering with at least 2 layers  You can also create layers by putting a cloth covering over a disposable non-medical mask  Cover your mouth and your nose  Try not to touch your face  If you get the virus on your hands, you can transfer it to your eyes, nose, or mouth and become infected  You can also transfer it to objects, surfaces, or people  Clean and disinfect high-touch surfaces and objects in your home often  Use disinfecting wipes, or make a solution by mixing 4 teaspoons of bleach with 1 quart (4 cups) of water  Do not  use any cleaning or disinfecting products that can trigger an asthma attack or other breathing problems  Open windows or have circulating air as you clean  Do not  mix ammonia with bleach  This will create toxic fumes  How to follow social distancing guidelines:  National and local social distancing rules vary  Rules and restrictions may change over time as restrictions are lifted  The following are general guidelines:  Stay home if you are sick or think you may have COVID-19  It is important to stay home if you are waiting for a testing appointment or for test results  Avoid close physical contact with anyone who does not live in your home  Do not shake hands with, hug, or kiss a person as a greeting  If you must use public transportation (such as a bus or subway), try to sit or stand away from others  Wear your face covering      Avoid in-person gatherings and crowds  Attend virtually if possible  Help strengthen your immune system:   Ask about other vaccines you may need  Get the influenza (flu) vaccine as soon as recommended each year, usually starting in September or October  Get the pneumonia vaccine if recommended  Your healthcare provider can tell you if you should also get other vaccines, and when to get them  Do not smoke  Nicotine and other chemicals in cigarettes and cigars can increase your risk for infection and for serious COVID-19 effects  Ask your healthcare provider for information if you currently smoke and need help to quit  E-cigarettes or smokeless tobacco still contain nicotine  Talk to your healthcare provider before you use these products  Eat a variety of healthy foods  Examples include vegetables, fruits, whole-grain breads and cereals, lean meats and poultry, fish, low-fat dairy products, and cooked beans  Healthy foods contain nutrients that help keep your immune system strong  Find ways to manage stress  You may be feeling more stressed than usual because of the COVID-19 outbreak  The situation is very stressful to many people  Talk to your healthcare providers about ways to manage stress during this time  Stress can lead to breathing problems or make the problems worse  Stress can trigger an attack or exacerbation of many health conditions  It is important to do things that help you feel more relaxed, such as the following:     Pick 1 or 2 times a day to watch the news  Constant news watching can increase your stress levels  Talk to a friend on the phone or through a video chat  Take a warm, soothing bath  Listen to music  Exercise can also help relieve stress  This may be hard if your regular gym or outdoor exercise area is closed  If you do not have exercise equipment at home, try walking inside your home  You can walk quickly or turn on music and dance      Follow up with your doctor or healthcare provider as directed: Your providers will tell you when you can come in for tests, procedures, or check-ups  Bring your symptom record with you to all appointments  Write down your questions so you remember to ask them during your visits  For more information:   [facility]  [address] , [address]  Phone: [phone]  Phone: [phone]  Web Address: [employer] com br    © Copyright [employer] 2022 Information is for End User's use only and may not be sold, redistributed or otherwise used for commercial purposes  All illustrations and images included in CareNotes® are the copyrighted property of A D A M , Inc  or [facility] Maritza Romano   The above information is an  only  It is not intended as medical advice for individual conditions or treatments  Talk to your doctor, nurse or pharmacist before following any medical regimen to see if it is safe and effective for you  No

## 2022-09-23 NOTE — DISCHARGE NOTE ADULT - PROVIDER RX CONTACT NUMBER
Received call from RAYSA Plata with [facility]. She states that patient's BP is consistently high- today 166/94 L arm, 178/102 R arm. Blood sugars have been in 300-400's and patient is not consistent with taking insulin. It was >400 today and she had eaten breakfast without taking insulin, nurse got her to take her insulin while she was at her home. She is asymptomatic.     Reached out to patient. She states she is feeling well. She is asymptomatic. She does state her blood pressures have been high and blood glucose levels have not been controlled. Writer asked her if she has any updated BG reading after her insulin but she states she just gave herself insulin, no updated level yet. She is aware of what to watch for in terms of red flag symptoms. She does have hospital f/u appointment 10/3/22.     LEIDY Long RN  [facility]   [phone]

## 2023-03-07 NOTE — CONSULT NOTE ADULT - CONSULT REQUESTED DATE/TIME
13-May-2019 12:38 Bactrim Pregnancy And Lactation Text: This medication is Pregnancy Category D and is known to cause fetal risk.  It is also excreted in breast milk.

## 2023-03-10 NOTE — PROGRESS NOTE ADULT - SUBJECTIVE AND OBJECTIVE BOX
Addended by: LYN SIERRA on: 3/10/2023 06:11 AM     Modules accepted: Orders     Pt with history of polymyalgia rheumatic, chornic steroid use, known multiple compression fractures. Pt presents now with more than 1-2 months of severe LE weakness, L>R. unable to ambulate. Worsening back pain. Abdominal distension. CT abdomen showed distended bladder, multiple compression fracture of various ages, T11, T12 appear new since Dec 2017 X-ray but still appear somewhat sclerotic.     On exam LLE prox 0/5, Dist 3-4/5, RLE prox 2-3/5, dist 5/5. Pt with some hemisensory changes as well.     Pt unable to have MRI without anaesthesia secondary to pain.    Case D/w Neurology  Recc MRI brain, C-T-L spine whithout contrast as unclear etiology for patient progressive extensive weakness.   Pt also has DVT - OK for lovenox at this time

## 2023-08-16 NOTE — CONSULT NOTE ADULT - NEGATIVE MUSCULOSKELETAL SYMPTOMS
no myalgia/no arthralgia Double O-Z Flap Text: The defect edges were debeveled with a #15 scalpel blade.  Given the location of the defect, shape of the defect and the proximity to free margins a Double O-Z flap was deemed most appropriate.  Using a sterile surgical marker, an appropriate transposition flap was drawn incorporating the defect and placing the expected incisions within the relaxed skin tension lines where possible. The area thus outlined was incised deep to adipose tissue with a #15 scalpel blade.  The skin margins were undermined to an appropriate distance in all directions utilizing iris scissors.  Following this, the designed flap was advanced and carried over into the primary defect and sutured into place.

## 2023-11-22 NOTE — ED ADULT NURSE NOTE - CAS DISCH ACCOMP BY
Will address this duplicated patient message; encounter from 11/15 (with additional medication questions). Provider will be consulted about Zonisamide and Rizatriptan (SEE TE from 11/15).    Lauri Palacios RN, BSN  [facility] Neurology     Family

## 2024-01-14 NOTE — ED ADULT TRIAGE NOTE - OTHER LANGUAGE
Patient is a 23y old  Female who presents with a chief complaint of Catatonia (13 Jan 2024 10:31)      SUBJECTIVE / OVERNIGHT EVENTS:    feels well. states she feels better.     ROS:  14 point ROS negative in detail except stated as above    MEDICATIONS  (STANDING):  chlorhexidine 2% Cloths 1 Application(s) Topical <User Schedule>  enoxaparin Injectable 40 milliGRAM(s) SubCutaneous every 24 hours  LORazepam     Tablet 1 milliGRAM(s) Oral at bedtime  risperiDONE   Tablet 1 milliGRAM(s) Oral two times a day    MEDICATIONS  (PRN):  melatonin 3 milliGRAM(s) Oral at bedtime PRN Insomnia      CAPILLARY BLOOD GLUCOSE        I&O's Summary    13 Jan 2024 07:01  -  14 Jan 2024 07:00  --------------------------------------------------------  IN: 360 mL / OUT: 0 mL / NET: 360 mL        PHYSICAL EXAM:  Vital Signs Last 24 Hrs  T(C): 36.9 (14 Jan 2024 05:07), Max: 37 (13 Jan 2024 21:30)  T(F): 98.4 (14 Jan 2024 05:07), Max: 98.6 (13 Jan 2024 21:30)  HR: 90 (14 Jan 2024 05:07) (69 - 111)  BP: 115/71 (14 Jan 2024 05:07) (111/64 - 121/70)  BP(mean): --  RR: 18 (14 Jan 2024 05:07) (18 - 19)  SpO2: 100% (14 Jan 2024 05:07) (96% - 100%)    Parameters below as of 14 Jan 2024 05:07  Patient On (Oxygen Delivery Method): room air    CONSTITUTIONAL: NAD, well-developed, well-groomed  EYES: PERRL; conjunctiva and sclera clear  ENMT: Moist oral mucosa, no pharyngeal injection or exudates; normal dentition  NECK: Supple, no palpable masses;   RESPIRATORY: Normal respiratory effort; lungs are clear to auscultation bilaterally  CARDIOVASCULAR: Regular rate and rhythm, normal S1 and S2, no murmur/rub/gallop; No lower extremity edema; Peripheral pulses are 2+ bilaterally  ABDOMEN: Nontender to palpation, normoactive bowel sounds, no rebound/guarding; No hepatosplenomegaly  MUSCULOSKELETAL:  Normal gait; no clubbing or cyanosis of digits; no joint swelling or tenderness to palpation  PSYCH: A+O to person, place, and time; flat affect  NEUROLOGY: CN 2-12 are intact and symmetric; no gross sensory deficits   SKIN: No rashes; no palpable lesions      LABS:                    RADIOLOGY & ADDITIONAL TESTS:    Imaging Personally Reviewed:    Consultant(s) Notes Reviewed:      Care Discussed with Consultants/Other Providers:   bob

## 2024-05-29 NOTE — ED ADULT NURSE NOTE - NSIMPLEMENTINTERV_GEN_ALL_ED
Pt confirmed appointment   
Implemented All Universal Safety Interventions:  [place] to call system. Call bell, personal items and telephone within reach. Instruct patient to call for assistance. Room bathroom lighting operational. Non-slip footwear when patient is off stretcher. Physically safe environment: no spills, clutter or unnecessary equipment. Stretcher in lowest position, wheels locked, appropriate side rails in place.